# Patient Record
Sex: FEMALE | Race: WHITE | NOT HISPANIC OR LATINO | Employment: FULL TIME | URBAN - NONMETROPOLITAN AREA
[De-identification: names, ages, dates, MRNs, and addresses within clinical notes are randomized per-mention and may not be internally consistent; named-entity substitution may affect disease eponyms.]

---

## 2019-03-12 ENCOUNTER — OFFICE VISIT (OUTPATIENT)
Dept: GASTROENTEROLOGY | Facility: CLINIC | Age: 34
End: 2019-03-12

## 2019-03-12 VITALS
BODY MASS INDEX: 35.5 KG/M2 | WEIGHT: 188 LBS | HEIGHT: 61 IN | DIASTOLIC BLOOD PRESSURE: 83 MMHG | SYSTOLIC BLOOD PRESSURE: 126 MMHG | RESPIRATION RATE: 20 BRPM | HEART RATE: 80 BPM | TEMPERATURE: 97.7 F

## 2019-03-12 DIAGNOSIS — R11.0 NAUSEA: Chronic | ICD-10-CM

## 2019-03-12 DIAGNOSIS — R12 HEARTBURN: Chronic | ICD-10-CM

## 2019-03-12 DIAGNOSIS — R10.32 LEFT LOWER QUADRANT PAIN: Primary | Chronic | ICD-10-CM

## 2019-03-12 DIAGNOSIS — K62.5 BRIGHT RED BLOOD PER RECTUM: Chronic | ICD-10-CM

## 2019-03-12 DIAGNOSIS — R19.7 DIARRHEA, UNSPECIFIED TYPE: Chronic | ICD-10-CM

## 2019-03-12 DIAGNOSIS — Z12.11 COLON CANCER SCREENING: ICD-10-CM

## 2019-03-12 DIAGNOSIS — K59.00 CONSTIPATION, UNSPECIFIED CONSTIPATION TYPE: Chronic | ICD-10-CM

## 2019-03-12 DIAGNOSIS — R13.10 DYSPHAGIA, UNSPECIFIED TYPE: Chronic | ICD-10-CM

## 2019-03-12 PROCEDURE — 99204 OFFICE O/P NEW MOD 45 MIN: CPT | Performed by: NURSE PRACTITIONER

## 2019-03-12 RX ORDER — SODIUM CHLORIDE 9 MG/ML
70 INJECTION, SOLUTION INTRAVENOUS CONTINUOUS PRN
Status: CANCELLED | OUTPATIENT
Start: 2019-04-15

## 2019-03-12 RX ORDER — METRONIDAZOLE 250 MG/1
250 TABLET ORAL 4 TIMES DAILY
Qty: 28 TABLET | Refills: 0 | Status: SHIPPED | OUTPATIENT
Start: 2019-03-12 | End: 2019-03-19

## 2019-03-12 RX ORDER — DICYCLOMINE HYDROCHLORIDE 10 MG/1
10 CAPSULE ORAL DAILY PRN
COMMUNITY
End: 2019-03-21 | Stop reason: HOSPADM

## 2019-03-12 RX ORDER — SODIUM CHLORIDE 9 MG/ML
70 INJECTION, SOLUTION INTRAVENOUS CONTINUOUS PRN
Status: CANCELLED | OUTPATIENT
Start: 2019-03-12

## 2019-03-12 RX ORDER — RANITIDINE 150 MG/1
150 CAPSULE ORAL 2 TIMES DAILY
Qty: 60 CAPSULE | Refills: 5 | Status: SHIPPED | OUTPATIENT
Start: 2019-03-12 | End: 2019-03-21 | Stop reason: HOSPADM

## 2019-03-12 RX ORDER — NAPROXEN 500 MG/1
1000 TABLET ORAL AS NEEDED
COMMUNITY
End: 2022-04-26

## 2019-03-12 RX ORDER — PHENAZOPYRIDINE HYDROCHLORIDE 200 MG/1
200 TABLET, FILM COATED ORAL 3 TIMES DAILY PRN
COMMUNITY
End: 2019-04-11

## 2019-03-12 RX ORDER — CIPROFLOXACIN 500 MG/1
500 TABLET, FILM COATED ORAL 2 TIMES DAILY
Qty: 14 TABLET | Refills: 0 | Status: SHIPPED | OUTPATIENT
Start: 2019-03-12 | End: 2019-03-19

## 2019-03-12 NOTE — PATIENT INSTRUCTIONS
1. Antireflux measures: Avoid fried, fatty foods, alcohol, chocolate, coffee, tea,  soft drinks, peppermint and spearmint, spicy foods, tomatoes and tomato based foods, onion based foods, and smoking. Other antireflux measures include weight reduction if overweight, avoiding tight clothing around the abdomen, elevating the head of the bed 6 inches with blocks under the head board, and don't drink or eat before going to bed and avoid lying down immediately after meals.  2. Ranitidine 150 mg 1 po twice a day.  3. Zofran 4 mg 1 po every 8 hours as needed for nasuea.  4. Treatment for diverticulitis:  A. Low-fat low fiber diet for 5 days thereafter low-fat high-fiber diet.   B. Cipro (ciprofloxacin) tablets 500 mg. Take 1 tablet by mouth twice a day for 7 days. Side effects were discussed.   C. Flagyl (metronidazole) tablets 250 mg. Take 1 tablet one by mouth 4 times a day for 7 days. Side effects were discussed.  D. Avoid laxatives, enemas for next 5 days. However, for constipation the patient may use stool softeners.  E. Patient may take probiotics while taking antibiotics, and continue for an additional 1-2 weeks.  F. Colonoscopy: Description of the procedure, risks, benefits, alternatives and options, including nonoperative options, were discussed with the patient in detail. The patient understands and wishes to proceed, although it may be advisable to wait 3-4 weeks to schedule procedure.  5. The patient is to call in 1 week with update.  6. Upper endoscopy-EGD: Description of the procedure, risks, benefits, alternatives and options, including nonoperative options, were discussed with the patient in detail. The patient understands and wishes to proceed.

## 2019-03-12 NOTE — PROGRESS NOTES
Chief Complaint   Patient presents with   • Abdominal Pain   • Colon Cancer Screening   • Constipation   • Rectal Bleeding     There is a long standing history of left lower quadrant pain. The pain has gradually worsened over the past year. The pain is severe and will occur 1-2 times per month lasting 1-2 days. The pain is so severe, she will often have to call in sick to work. The pain will radiate down into the perineum and left leg at times. The patient has had frequent chills off and on for quite some time, but no fevers.    There is a long standing history of constipation alternating with diarrhea. The patient has been having constipation for the past year and no diarrhea. The patient will have 1 firm bowel movement 2-3 times per week. The patient has been taking Miralax 1-2 times per week with improvement of constipation.     There is a long standing history of bright red blood per rectum. The patient may have bright red blood on the tissue 1-2 times per month and occasionally will see blood in the toilet. It is a small amount.     There is a long standing history of nausea. The patient has nausea 3-4 days per week. Eating a cracker will improve nausea. The patient may have vomiting 1-2 times per month. No history of hematemesis.     There is a history of heartburn for the past year. The patient has heartburn daily. Heartburn occurs daily and will often be worse at night. If the patient is having the pain and constipation, the heartburn is worse. She is not taking anything for heartburn.    There is a long standing history of difficulty swallowing. The patient may feel like breads get stuck 1-2 times per month.    The patient has not had a colonoscopy in the past. There is a family history of colon cancer in the patient's maternal uncle, maternal grandmother, paternal grandmother and paternal grandfather.     Constipation   This is a chronic problem. Episode onset: over 5 years. The problem is unchanged. Her  stool frequency is 2 to 3 times per week. The stool is described as firm. The patient is on a high fiber diet. There has been adequate water intake. Associated symptoms include abdominal pain, diarrhea, hematochezia, nausea and vomiting. Pertinent negatives include no fever. Treatments tried: Miralax. The treatment provided significant relief.   Rectal Bleeding   This is a chronic problem. Episode onset: over 5 years. The problem occurs intermittently. The problem has been unchanged. Associated symptoms include abdominal pain, fatigue, nausea and vomiting. Pertinent negatives include no arthralgias, chest pain, chills, coughing, fever, headaches, joint swelling, myalgias or rash. Exacerbated by: bowel movements. She has tried nothing for the symptoms.   Abdominal Pain   This is a chronic problem. Episode onset: over 5 years. The onset quality is sudden. The problem occurs intermittently. The pain is located in the LLQ. The pain is severe. The quality of the pain is sharp. The abdominal pain radiates to the perineum. Associated symptoms include constipation, diarrhea, hematochezia, hematuria, nausea and vomiting. Pertinent negatives include no arthralgias, dysuria, fever, headaches or myalgias. The pain is relieved by nothing. Treatments tried: Dicyclomine. The treatment provided moderate relief. Her past medical history is significant for GERD.   Nausea   This is a chronic problem. Episode onset: over 3 years. The problem occurs intermittently. The problem has been unchanged. Associated symptoms include abdominal pain, fatigue, nausea and vomiting. Pertinent negatives include no arthralgias, chest pain, chills, coughing, fever, headaches, joint swelling, myalgias or rash. Nothing aggravates the symptoms. She has tried eating for the symptoms. The treatment provided moderate relief.   Heartburn   She complains of abdominal pain, heartburn and nausea. She reports no chest pain or no coughing. This is a chronic  problem. The current episode started more than 1 year ago. The problem occurs frequently. The problem has been unchanged. The heartburn duration is an hour. The heartburn is located in the substernum. The heartburn is of severe intensity. The heartburn wakes her from sleep. Nothing aggravates the symptoms. Associated symptoms include fatigue. Risk factors include caffeine use. She has tried nothing for the symptoms.   Difficulty Swallowing   This is a chronic problem. The current episode started more than 1 year ago. The problem occurs intermittently. The problem has been unchanged. Associated symptoms include abdominal pain, fatigue, nausea and vomiting. Pertinent negatives include no arthralgias, chest pain, chills, coughing, fever, headaches, joint swelling, myalgias or rash. The symptoms are aggravated by eating. She has tried nothing for the symptoms.     Review of Systems   Constitutional: Positive for fatigue. Negative for appetite change, chills, fever and unexpected weight change.   HENT: Positive for dental problem. Negative for mouth sores, nosebleeds and trouble swallowing.    Eyes: Negative for discharge and redness.   Respiratory: Negative for apnea, cough and shortness of breath.    Cardiovascular: Negative for chest pain, palpitations and leg swelling.   Gastrointestinal: Positive for abdominal pain, anal bleeding, blood in stool, constipation, diarrhea, heartburn, hematochezia, nausea and vomiting.   Endocrine: Positive for cold intolerance and polydipsia. Negative for heat intolerance.   Genitourinary: Positive for hematuria. Negative for dysuria and urgency.   Musculoskeletal: Negative for arthralgias, joint swelling and myalgias.   Skin: Negative for rash.   Allergic/Immunologic: Negative for food allergies and immunocompromised state.   Neurological: Negative for dizziness, seizures, syncope and headaches.   Hematological: Negative for adenopathy. Bruises/bleeds easily.   Psychiatric/Behavioral:  Negative for dysphoric mood. The patient is nervous/anxious. The patient is not hyperactive.      Patient Active Problem List   Diagnosis   • Left lower quadrant pain   • Constipation   • Bright red blood per rectum   • Nausea   • Dysphagia   • Heartburn   • Diarrhea     Past Medical History:   Diagnosis Date   • Diabetes mellitus (CMS/HCC)     Type I   • Gallstones    • Hypertension    • Kidney infection    • Kidney stone    • Migraine    • Tattoos      Past Surgical History:   Procedure Laterality Date   •  SECTION      , ,    • D&C HYSTEROSCOPY ENDOMETRIAL ABLATION     • TEETH EXTRACTION      dentures on upper plate   • TUBAL ABDOMINAL LIGATION       Family History   Problem Relation Age of Onset   • Diabetes Other    • Colon cancer Paternal Uncle    • Colon cancer Maternal Grandmother    • Crohn's disease Maternal Grandmother    • Colon cancer Paternal Grandmother    • Other Paternal Grandmother         Colitis   • Colon cancer Paternal Grandfather    • Cirrhosis Neg Hx    • Liver cancer Neg Hx    • Liver disease Neg Hx    • Ulcerative colitis Neg Hx    • Esophageal cancer Neg Hx    • Stomach cancer Neg Hx      Social History     Tobacco Use   • Smoking status: Former Smoker     Packs/day: 0.25     Types: Cigarettes     Start date:      Last attempt to quit:      Years since quittin.1   • Smokeless tobacco: Never Used   Substance Use Topics   • Alcohol use: Yes     Comment: Social       Current Outpatient Medications:   •  dicyclomine (BENTYL) 10 MG capsule, Take 10 mg by mouth Daily., Disp: , Rfl:   •  Ertugliflozin L-PyroglutamicAc (STEGLATRO) 15 MG tablet, Take 15 mg by mouth Every Morning., Disp: , Rfl:   •  Insulin Glargine (BASAGLAR KWIKPEN SC), Inject 60 Units under the skin into the appropriate area as directed Daily., Disp: , Rfl:   •  naproxen (NAPROSYN) 500 MG tablet, Take 1,000 mg by mouth As Needed for Mild Pain ., Disp: , Rfl:   •  ondansetron (ZOFRAN) 4 MG  "tablet, Take 4 mg by mouth Every 8 (Eight) Hours As Needed., Disp: , Rfl:   •  phenazopyridine (PYRIDIUM) 200 MG tablet, Take 200 mg by mouth 3 (Three) Times a Day As Needed for bladder spasms., Disp: , Rfl:   •  ciprofloxacin (CIPRO) 500 MG tablet, Take 1 tablet by mouth 2 (Two) Times a Day for 7 days. Take 1 tablet twice a day x 7 days, Disp: 14 tablet, Rfl: 0  •  metroNIDAZOLE (FLAGYL) 250 MG tablet, Take 1 tablet by mouth 4 (Four) Times a Day for 7 days. Take 1 tablet four times daily x 7 days, Disp: 28 tablet, Rfl: 0  •  Probiotic capsule, Take 1 capsule by mouth Daily., Disp: 30 capsule, Rfl: 1  •  ranitidine (ZANTAC) 150 MG capsule, Take 1 capsule by mouth 2 (Two) Times a Day., Disp: 60 capsule, Rfl: 5    Allergies   Allergen Reactions   • Hydrocodone Itching and Rash   • Jardiance [Empagliflozin] Itching and Rash     Blood pressure 126/83, pulse 80, temperature 97.7 °F (36.5 °C), resp. rate 20, height 154.9 cm (61\"), weight 85.3 kg (188 lb), not currently breastfeeding.    Physical Exam   Constitutional: She is oriented to person, place, and time. She appears well-developed and well-nourished. No distress.   HENT:   Head: Normocephalic and atraumatic.   Right Ear: Hearing and external ear normal.   Left Ear: Hearing and external ear normal.   Nose: Nose normal.   Mouth/Throat: Oropharynx is clear and moist and mucous membranes are normal. Mucous membranes are not pale, not dry and not cyanotic. No oral lesions. No oropharyngeal exudate.   Eyes: Conjunctivae and EOM are normal. Right eye exhibits no discharge. Left eye exhibits no discharge.   Neck: Trachea normal. Neck supple. No JVD present. No edema present. No thyroid mass and no thyromegaly present.   Cardiovascular: Normal rate, regular rhythm, S2 normal and normal heart sounds. Exam reveals no gallop, no S3 and no friction rub.   No murmur heard.  Pulmonary/Chest: Effort normal and breath sounds normal. No respiratory distress. She exhibits no " tenderness.   Abdominal: Normal appearance and bowel sounds are normal. She exhibits no distension, no ascites and no mass. There is no splenomegaly or hepatomegaly. There is tenderness (moderate to severe in left lower quadrant, mild to moderate in epigastric area) in the epigastric area and left lower quadrant. There is no rigidity, no rebound and no guarding. No hernia.     Vascular Status -  Her right foot exhibits no edema. Her left foot exhibits no edema.  Lymphadenopathy:     She has no cervical adenopathy.        Left: No supraclavicular adenopathy present.   Neurological: She is alert and oriented to person, place, and time. She has normal strength. No cranial nerve deficit or sensory deficit.   Skin: No rash noted. She is not diaphoretic. No cyanosis. No pallor. Nails show no clubbing.   Psychiatric: She has a normal mood and affect.   Nursing note and vitals reviewed.  Stigmata of chronic liver disease:  None.  Asterixis:  None.    Assessment:      ICD-10-CM ICD-9-CM   1. Left lower quadrant pain R10.32 789.04   2. Constipation, unspecified constipation type K59.00 564.00   3. Bright red blood per rectum K62.5 569.3   4. Nausea R11.0 787.02   5. Dysphagia, unspecified type R13.10 787.20   6. Heartburn R12 787.1   7. Diarrhea, unspecified type R19.7 787.91   8. Colon cancer screening Z12.11 V76.51       Discussion:  1. Left lower quadrant pain consistent with diverticulitis.  2. History of recurrent constipation.  This is described as a change in bowel habits.  Differentials include diverticulitis.  3. History of bright red blood per rectum.  Differentials include diverticulitis, hemorrhoids, fissure, vascular ectasia.  4. Long-standing history of nausea.  Differentials include diverticulitis, peptic ulcer disease, pancreatobiliary disease.  5. Long-standing history of difficulty swallowing.  Differentials include Schatzki's ring, esophagitis, esophageal dysmotility.  6. Long-standing history of heartburn.   Not on medications at this time.  Concerns for underlying Batres's.  7. Long-standing history of diarrhea with significant improvement over the past year.  8. The patient has not had a colonoscopy in the past. There is a family history of colon cancer in the patient's maternal uncle, maternal grandmother, paternal grandmother and paternal grandfather.     Plan/  Patient Instructions   1. Antireflux measures: Avoid fried, fatty foods, alcohol, chocolate, coffee, tea,  soft drinks, peppermint and spearmint, spicy foods, tomatoes and tomato based foods, onion based foods, and smoking. Other antireflux measures include weight reduction if overweight, avoiding tight clothing around the abdomen, elevating the head of the bed 6 inches with blocks under the head board, and don't drink or eat before going to bed and avoid lying down immediately after meals.  2. Ranitidine 150 mg 1 po twice a day.  3. Zofran 4 mg 1 po every 8 hours as needed for nasuea.  4. Treatment for diverticulitis:  A. Low-fat low fiber diet for 5 days thereafter low-fat high-fiber diet.   B. Cipro (ciprofloxacin) tablets 500 mg. Take 1 tablet by mouth twice a day for 7 days. Side effects were discussed.   C. Flagyl (metronidazole) tablets 250 mg. Take 1 tablet one by mouth 4 times a day for 7 days. Side effects were discussed.  D. Avoid laxatives, enemas for next 5 days. However, for constipation the patient may use stool softeners.  E. Patient may take probiotics while taking antibiotics, and continue for an additional 1-2 weeks.  F. Colonoscopy: Description of the procedure, risks, benefits, alternatives and options, including nonoperative options, were discussed with the patient in detail. The patient understands and wishes to proceed, although it may be advisable to wait 3-4 weeks to schedule procedure.  5. The patient is to call in 1 week with update.  6. Upper endoscopy-EGD: Description of the procedure, risks, benefits, alternatives and  options, including nonoperative options, were discussed with the patient in detail. The patient understands and wishes to proceed.     Agustin Arreguin, APRN

## 2019-03-21 ENCOUNTER — ANESTHESIA (OUTPATIENT)
Dept: GASTROENTEROLOGY | Facility: HOSPITAL | Age: 34
End: 2019-03-21

## 2019-03-21 ENCOUNTER — ANESTHESIA EVENT (OUTPATIENT)
Dept: GASTROENTEROLOGY | Facility: HOSPITAL | Age: 34
End: 2019-03-21

## 2019-03-21 ENCOUNTER — HOSPITAL ENCOUNTER (OUTPATIENT)
Facility: HOSPITAL | Age: 34
Setting detail: HOSPITAL OUTPATIENT SURGERY
Discharge: HOME | End: 2019-03-21
Attending: INTERNAL MEDICINE | Admitting: INTERNAL MEDICINE

## 2019-03-21 VITALS
HEART RATE: 77 BPM | WEIGHT: 188 LBS | TEMPERATURE: 97.9 F | HEIGHT: 61 IN | DIASTOLIC BLOOD PRESSURE: 67 MMHG | BODY MASS INDEX: 35.5 KG/M2 | SYSTOLIC BLOOD PRESSURE: 104 MMHG | OXYGEN SATURATION: 98 % | RESPIRATION RATE: 18 BRPM

## 2019-03-21 DIAGNOSIS — R12 HEARTBURN: ICD-10-CM

## 2019-03-21 DIAGNOSIS — R11.0 NAUSEA: ICD-10-CM

## 2019-03-21 DIAGNOSIS — R13.10 DYSPHAGIA, UNSPECIFIED TYPE: ICD-10-CM

## 2019-03-21 LAB
B-HCG UR QL: NEGATIVE
GLUCOSE BLDC GLUCOMTR-MCNC: 129 MG/DL (ref 70–130)
INTERNAL NEGATIVE CONTROL: NEGATIVE
INTERNAL POSITIVE CONTROL: POSITIVE
Lab: NORMAL

## 2019-03-21 PROCEDURE — 81025 URINE PREGNANCY TEST: CPT | Performed by: INTERNAL MEDICINE

## 2019-03-21 PROCEDURE — 82962 GLUCOSE BLOOD TEST: CPT

## 2019-03-21 PROCEDURE — 25010000002 ONDANSETRON PER 1 MG: Performed by: NURSE ANESTHETIST, CERTIFIED REGISTERED

## 2019-03-21 PROCEDURE — 25010000002 PROPOFOL 200 MG/20ML EMULSION: Performed by: NURSE ANESTHETIST, CERTIFIED REGISTERED

## 2019-03-21 PROCEDURE — 43239 EGD BIOPSY SINGLE/MULTIPLE: CPT | Performed by: INTERNAL MEDICINE

## 2019-03-21 PROCEDURE — C1726 CATH, BAL DIL, NON-VASCULAR: HCPCS

## 2019-03-21 PROCEDURE — 43249 ESOPH EGD DILATION <30 MM: CPT | Performed by: INTERNAL MEDICINE

## 2019-03-21 RX ORDER — PROPOFOL 10 MG/ML
INJECTION, EMULSION INTRAVENOUS AS NEEDED
Status: DISCONTINUED | OUTPATIENT
Start: 2019-03-21 | End: 2019-03-21 | Stop reason: SURG

## 2019-03-21 RX ORDER — ONDANSETRON 2 MG/ML
INJECTION INTRAMUSCULAR; INTRAVENOUS AS NEEDED
Status: DISCONTINUED | OUTPATIENT
Start: 2019-03-21 | End: 2019-03-21 | Stop reason: SURG

## 2019-03-21 RX ORDER — SODIUM CHLORIDE 9 MG/ML
70 INJECTION, SOLUTION INTRAVENOUS CONTINUOUS PRN
Status: DISCONTINUED | OUTPATIENT
Start: 2019-03-21 | End: 2019-03-21 | Stop reason: HOSPADM

## 2019-03-21 RX ORDER — METOCLOPRAMIDE 5 MG/1
2.5 TABLET ORAL
Qty: 30 TABLET | Refills: 0 | Status: SHIPPED | OUTPATIENT
Start: 2019-03-21 | End: 2022-04-25

## 2019-03-21 RX ORDER — PANTOPRAZOLE SODIUM 40 MG/1
TABLET, DELAYED RELEASE ORAL
Qty: 30 TABLET | Refills: 3 | Status: SHIPPED | OUTPATIENT
Start: 2019-03-21 | End: 2021-08-26

## 2019-03-21 RX ADMIN — LIDOCAINE HYDROCHLORIDE 40 MG: 20 INJECTION, SOLUTION INTRAVENOUS at 08:03

## 2019-03-21 RX ADMIN — PROPOFOL 50 MG: 10 INJECTION, EMULSION INTRAVENOUS at 08:18

## 2019-03-21 RX ADMIN — PROPOFOL 100 MG: 10 INJECTION, EMULSION INTRAVENOUS at 08:12

## 2019-03-21 RX ADMIN — PROPOFOL 50 MG: 10 INJECTION, EMULSION INTRAVENOUS at 08:06

## 2019-03-21 RX ADMIN — ONDANSETRON 4 MG: 2 INJECTION INTRAMUSCULAR; INTRAVENOUS at 08:03

## 2019-03-21 RX ADMIN — PROPOFOL 50 MG: 10 INJECTION, EMULSION INTRAVENOUS at 08:15

## 2019-03-21 RX ADMIN — SODIUM CHLORIDE: 9 INJECTION, SOLUTION INTRAVENOUS at 07:57

## 2019-03-21 RX ADMIN — LIDOCAINE HYDROCHLORIDE 60 MG: 20 INJECTION, SOLUTION INTRAVENOUS at 08:14

## 2019-03-21 RX ADMIN — PROPOFOL 50 MG: 10 INJECTION, EMULSION INTRAVENOUS at 08:03

## 2019-03-21 RX ADMIN — SODIUM CHLORIDE 70 ML/HR: 9 INJECTION, SOLUTION INTRAVENOUS at 07:00

## 2019-03-21 NOTE — DISCHARGE INSTRUCTIONS
No pushing, pulling, tugging,  heavy lifting, or strenuous activity.  No major decision making, driving, or drinking alcoholic beverages for 24 hours. ( due to the medications you have  received)  Always use good hand hygiene/washing techniques.  NO driving while taking pain medications.    To assist you in voiding:  Drink plenty of fluids  Listen to running water while attempting to void.    If you are unable to urinate and you have an uncomfortable urge to void or it has been   6 hours since you were discharged, return to the Emergency Room    ************************************************************************************    Postprocedure instructions:  1. Nothing by mouth until fully alert and as specified below .  2. Bedrest until fully alert.  3. Vital signs as routine.    Diet:   1. Nothing by mouth for 60 minutes, then if no chest pains the patient may have Clear liquids diet (No Sodas) for 4 hours.  2. May advance to soft diet in 4 hours if no chest pains, Fever or chills, nausea vomiting or bleeding.    Other instructions:  1. The patient may eat in upright position, chew well, take small bites and take medications in upright position.   2. The patient should drink water after 3-4 bites, and liberally with medications.   3. The patient should remain upright for about 10 minutes after eating and taking oral medications.      Blood Thinner and other medications Directions:  Avoid Aspirin & other NSAIDS for 7 days.  Tylenol is okay.    Other instructions:  The patient should avoid medications that have a potential to cause pill esophagitis including potassium pills, tetracycline capsules, iron pills, NSAIDs and bisphosphonates.      Follow-up:    DR. WILL HELTON in 4 weeks.Office phone # [phone].      ********************************************************************************************************************************************************    Notes to the patient and the family from   Becky.     Dear patient/family member,    Findings on today's procedure are as follows:    1. Esophagitis. Inflammation of the esophagus.  2. Esophageal rings. These areas were stretched.   3. Inflammation of the stomach.  Erosive gastritis.  4. Small sliding hiatal hernia.  5. No cancer. No active ulcers.     Recommendations:    1. Protonix (Pantoprazole) tablet 40 mg tablet. Take 1 tablet orally in the morning half an hour before eating every day.  2. Reglan (metoclopramide) 5 mg tablets.  Take one half tablet (2.5 mg) by mouth in the morning and in the evening (2 times daily preferably half an hour before food).    3. Other instructions as above.      Should you have more questions please do not hesitate to talk to the nurse who can call me and let me talk to you.     I hope you feel better.    Adán Pena M.D., FACP, FACG.

## 2019-03-21 NOTE — ANESTHESIA PREPROCEDURE EVALUATION
Anesthesia Evaluation     Patient summary reviewed and Nursing notes reviewed   history of anesthetic complications: PONV  NPO Solid Status: > 8 hours  NPO Liquid Status: > 8 hours           Airway   Mallampati: I  TM distance: >3 FB  Neck ROM: full  No difficulty expected  Dental - normal exam   (+) upper dentures    Pulmonary - negative pulmonary ROS and normal exam   Cardiovascular - normal exam    (+) hypertension,       Neuro/Psych  (+) headaches, psychiatric history Anxiety,     GI/Hepatic/Renal/Endo    (+) obesity,   diabetes mellitus,     Musculoskeletal (-) negative ROS    Abdominal  - normal exam    Bowel sounds: normal.   Substance History - negative use     OB/GYN negative ob/gyn ROS         Other                        Anesthesia Plan    ASA 2     MAC     intravenous induction   Anesthetic plan, all risks, benefits, and alternatives have been provided, discussed and informed consent has been obtained with: patient.    Plan discussed with attending.

## 2019-03-25 ENCOUNTER — TELEPHONE (OUTPATIENT)
Dept: GASTROENTEROLOGY | Facility: CLINIC | Age: 34
End: 2019-03-25

## 2019-03-25 DIAGNOSIS — K59.00 CONSTIPATION, UNSPECIFIED CONSTIPATION TYPE: ICD-10-CM

## 2019-03-25 DIAGNOSIS — K62.5 BRIGHT RED BLOOD PER RECTUM: ICD-10-CM

## 2019-03-25 DIAGNOSIS — R10.32 LEFT LOWER QUADRANT PAIN: Primary | ICD-10-CM

## 2019-03-25 DIAGNOSIS — R19.7 DIARRHEA, UNSPECIFIED TYPE: ICD-10-CM

## 2019-03-25 LAB
LAB AP CASE REPORT: NORMAL
PATH REPORT.FINAL DX SPEC: NORMAL

## 2019-03-25 RX ORDER — CIPROFLOXACIN 500 MG/1
500 TABLET, FILM COATED ORAL 2 TIMES DAILY
Qty: 14 TABLET | Refills: 0 | Status: SHIPPED | OUTPATIENT
Start: 2019-03-25 | End: 2019-04-01

## 2019-03-25 RX ORDER — METRONIDAZOLE 250 MG/1
250 TABLET ORAL 4 TIMES DAILY
Qty: 28 TABLET | Refills: 0 | Status: SHIPPED | OUTPATIENT
Start: 2019-03-25 | End: 2019-04-01

## 2019-03-25 NOTE — TELEPHONE ENCOUNTER
Pt is still having abdominal problems with diverticulitis, , is getting another round of antibiotics and to call us when finished

## 2019-03-25 NOTE — TELEPHONE ENCOUNTER
Patient had gotten better after taking Cipro and Flagyl, but after she finished, the symptoms returned. Will call in Cipro and Flagyl for 7 days as prior, she will call us back with update.

## 2019-04-04 PROBLEM — Z12.11 COLON CANCER SCREENING: Status: ACTIVE | Noted: 2019-04-04

## 2019-04-15 ENCOUNTER — ANESTHESIA EVENT (OUTPATIENT)
Dept: GASTROENTEROLOGY | Facility: HOSPITAL | Age: 34
End: 2019-04-15

## 2019-04-15 ENCOUNTER — ANESTHESIA (OUTPATIENT)
Dept: GASTROENTEROLOGY | Facility: HOSPITAL | Age: 34
End: 2019-04-15

## 2019-04-15 ENCOUNTER — HOSPITAL ENCOUNTER (OUTPATIENT)
Facility: HOSPITAL | Age: 34
Setting detail: HOSPITAL OUTPATIENT SURGERY
Discharge: HOME | End: 2019-04-15
Attending: INTERNAL MEDICINE | Admitting: INTERNAL MEDICINE

## 2019-04-15 VITALS
SYSTOLIC BLOOD PRESSURE: 127 MMHG | HEIGHT: 61 IN | RESPIRATION RATE: 18 BRPM | BODY MASS INDEX: 35.5 KG/M2 | DIASTOLIC BLOOD PRESSURE: 66 MMHG | OXYGEN SATURATION: 98 % | WEIGHT: 188 LBS | TEMPERATURE: 97.4 F | HEART RATE: 77 BPM

## 2019-04-15 DIAGNOSIS — Z12.11 COLON CANCER SCREENING: ICD-10-CM

## 2019-04-15 DIAGNOSIS — R19.7 DIARRHEA, UNSPECIFIED TYPE: ICD-10-CM

## 2019-04-15 DIAGNOSIS — K62.5 BRIGHT RED BLOOD PER RECTUM: ICD-10-CM

## 2019-04-15 DIAGNOSIS — R10.32 LEFT LOWER QUADRANT PAIN: ICD-10-CM

## 2019-04-15 DIAGNOSIS — K59.00 CONSTIPATION, UNSPECIFIED CONSTIPATION TYPE: ICD-10-CM

## 2019-04-15 LAB
B-HCG UR QL: NEGATIVE
GLUCOSE BLDC GLUCOMTR-MCNC: 94 MG/DL (ref 70–130)
INTERNAL NEGATIVE CONTROL: NEGATIVE
INTERNAL POSITIVE CONTROL: POSITIVE
Lab: NORMAL

## 2019-04-15 PROCEDURE — 45385 COLONOSCOPY W/LESION REMOVAL: CPT | Performed by: INTERNAL MEDICINE

## 2019-04-15 PROCEDURE — 81025 URINE PREGNANCY TEST: CPT | Performed by: INTERNAL MEDICINE

## 2019-04-15 PROCEDURE — 25010000002 PROPOFOL 10 MG/ML EMULSION: Performed by: NURSE ANESTHETIST, CERTIFIED REGISTERED

## 2019-04-15 PROCEDURE — 82962 GLUCOSE BLOOD TEST: CPT

## 2019-04-15 PROCEDURE — S0260 H&P FOR SURGERY: HCPCS | Performed by: INTERNAL MEDICINE

## 2019-04-15 PROCEDURE — 45380 COLONOSCOPY AND BIOPSY: CPT | Performed by: INTERNAL MEDICINE

## 2019-04-15 PROCEDURE — 25010000002 ONDANSETRON PER 1 MG: Performed by: NURSE ANESTHETIST, CERTIFIED REGISTERED

## 2019-04-15 DEVICE — CLIPPING DEVICE
Type: IMPLANTABLE DEVICE | Status: FUNCTIONAL
Brand: RESOLUTION CLIP

## 2019-04-15 RX ORDER — ONDANSETRON 2 MG/ML
4 INJECTION INTRAMUSCULAR; INTRAVENOUS ONCE AS NEEDED
Status: CANCELLED | OUTPATIENT
Start: 2019-04-15 | End: 2019-04-15

## 2019-04-15 RX ORDER — LIDOCAINE HYDROCHLORIDE 20 MG/ML
INJECTION, SOLUTION INFILTRATION; PERINEURAL AS NEEDED
Status: DISCONTINUED | OUTPATIENT
Start: 2019-04-15 | End: 2019-04-15 | Stop reason: SURG

## 2019-04-15 RX ORDER — ONDANSETRON 2 MG/ML
4 INJECTION INTRAMUSCULAR; INTRAVENOUS ONCE
Status: COMPLETED | OUTPATIENT
Start: 2019-04-15 | End: 2019-04-15

## 2019-04-15 RX ORDER — SODIUM CHLORIDE 0.9 % (FLUSH) 0.9 %
3 SYRINGE (ML) INJECTION AS NEEDED
Status: DISCONTINUED | OUTPATIENT
Start: 2019-04-15 | End: 2019-04-15 | Stop reason: HOSPADM

## 2019-04-15 RX ORDER — PROPOFOL 10 MG/ML
VIAL (ML) INTRAVENOUS AS NEEDED
Status: DISCONTINUED | OUTPATIENT
Start: 2019-04-15 | End: 2019-04-15 | Stop reason: SURG

## 2019-04-15 RX ORDER — LIDOCAINE 50 MG/G
OINTMENT TOPICAL AS NEEDED
Status: DISCONTINUED | OUTPATIENT
Start: 2019-04-15 | End: 2019-04-15 | Stop reason: HOSPADM

## 2019-04-15 RX ORDER — SODIUM CHLORIDE 9 MG/ML
70 INJECTION, SOLUTION INTRAVENOUS CONTINUOUS PRN
Status: DISCONTINUED | OUTPATIENT
Start: 2019-04-15 | End: 2019-04-15 | Stop reason: HOSPADM

## 2019-04-15 RX ORDER — SIMETHICONE 20 MG/.3ML
EMULSION ORAL AS NEEDED
Status: DISCONTINUED | OUTPATIENT
Start: 2019-04-15 | End: 2019-04-15 | Stop reason: HOSPADM

## 2019-04-15 RX ADMIN — ONDANSETRON 4 MG: 2 INJECTION INTRAMUSCULAR; INTRAVENOUS at 08:43

## 2019-04-15 RX ADMIN — Medication 3 ML: at 08:45

## 2019-04-15 RX ADMIN — SODIUM CHLORIDE: 9 INJECTION, SOLUTION INTRAVENOUS at 09:45

## 2019-04-15 RX ADMIN — LIDOCAINE HYDROCHLORIDE 100 MG: 20 INJECTION, SOLUTION INFILTRATION; PERINEURAL at 09:51

## 2019-04-15 RX ADMIN — PROPOFOL 700 MG: 10 INJECTION, EMULSION INTRAVENOUS at 10:31

## 2019-04-15 NOTE — DISCHARGE INSTRUCTIONS
"CLIP TEACHING SHEET INCLUDED IN PACKET.      Please follow all post op instructions and follow up appointment time from your physician's office included in your discharge packet.  .   No pushing, pulling, tugging,  heavy lifting, or strenuous activity.  No major decision making, driving, or drinking alcoholic beverages for 24 hours. ( due to the medications you have  received)  Always use good hand hygiene/washing techniques.  NO driving while taking pain medications.    To assist you in voiding:  Drink plenty of fluids  Listen to running water while attempting to void.    If you are unable to urinate and you have an uncomfortable urge to void or it has been   6 hours since you were discharged, return to the Emergency Room*******************************************    Postprocedure instructions:    Nothing by mouth to fully alert.  Once fully alert may have clear liquid diet.  Advance diet as tolerated.  Vital signs as routine.    Diet:     1. Low-fat diet.  2. Klaudia's All Bran-Bran Buds 1/3 cup daily.  3. May add Honey Bunches of Oats with Almonds 1/4 cup for taste.  4. Use almond milk, or \"fair life\"skim or 1% milk.  5. Drink liberal amounts of water.    Blood Thinner Directions:    Avoid Aspirin & other NSAIDS for _7__ days. Tylenol is okay.    Other Instructions:    Call  at [phone] or come to the Emergency Department if you experience the following: Chest pain, abdominal pain, bleeding (vomiting of blood or coffee colored material, black stools or ramses blood in stools), fever/chills, nausea and vomiting or dizziness.      Follow-up:  DR. WILL PENA in 4 weeks.Office phone # [phone].    Follow-up colonoscopy: in 5 years.    ************************************************************************************    Notes to the patient and the family from Dr. Pena.    Dear patient/family member,    Today your colon was examined extensively from rectum to cecum and beyond into " the small intestine twice.     Findings on today's procedure are as follows:    1. Diverticulosis. These are benign outpouchings in the colon.   2. Colon polyps. 2 were found and removed.   3. No cancer. No inflammation or colitis. No suggestion of Crohn's disease.  4. Internal hemorrhoids.    Recommendations:    As above.    Use CORTIZONE 10 OINTMENT (hydrocortisone 1% ointment) over the counter. AVOID CREAM OR GEL.  Apply anorectally  2-3 times a day for 1 week.  May need to use a liner to protect the garments.    Should you have more questions please do not hesitate to talk to the nurse who can call me and let me talk to you.      I hope you feel better.    Adán Pena M.D., FACP, FACG.

## 2019-04-15 NOTE — ANESTHESIA POSTPROCEDURE EVALUATION
Patient: Rain Barreto    Procedure Summary     Date:  04/15/19 Room / Location:  [facility] ENDOSCOPY 2 [facility]    Anesthesia Start:  0945 Anesthesia Stop:  1038    Procedure:  COLONOSCOPY W/ COLD FORCEP BIOPSIES; COLD SNARE POLYPECTOMY; COLD FORCEP POLYPECTOMY; RESOLUTION CLIP X1 (N/A Anus) Diagnosis:       Left lower quadrant pain      Constipation, unspecified constipation type      Bright red blood per rectum      Diarrhea, unspecified type      Colon cancer screening      (Left lower quadrant pain [R10.32])      (Constipation, unspecified constipation type [K59.00])      (Bright red blood per rectum [K62.5])      (Diarrhea, unspecified type [R19.7])      (Colon cancer screening [Z12.11])    Surgeon:  Adán Pena MD Provider:  Sai Lewis CRNA    Anesthesia Type:  MAC ASA Status:  2          Anesthesia Type: MAC  Last vitals  BP   127/66 (04/15/19 1120)   Temp   97.4 °F (36.3 °C) (04/15/19 1040)   Pulse   77 (04/15/19 1120)   Resp   18 (04/15/19 1120)     SpO2   98 % (04/15/19 1120)     Post Anesthesia Care and Evaluation    Patient location during evaluation: PHASE II  Patient participation: complete - patient participated  Level of consciousness: awake  Pain score: 1  Pain management: adequate  Airway patency: patent  Anesthetic complications: No anesthetic complications  PONV Status: controlled  Cardiovascular status: acceptable and stable  Respiratory status: acceptable  Hydration status: acceptable

## 2019-04-15 NOTE — H&P
Chief complaint:  Constipation/Diarrhea/BRBPR    History of present illness: No prior colonoscopy, Family History of Colon Cancer(Mat Uncle, GM x 2, GF), Constipation, Diarrhea, BRBPR, LLQ Abdominal pain, Treated for Diverticulitis, Nausea/Vomiting, Heartburn, Dysphagia      Past medical history:   Past Medical History:   Diagnosis Date   • Anxiety    • Diabetes mellitus (CMS/HCC)     Type I   • Dysphagia    • Gallstones    • Hypertension    • Kidney infection    • Kidney stone    • Migraine    • PONV (postoperative nausea and vomiting)    • Tattoos    • Wears dentures     upper       Surgical history:    Past Surgical History:   Procedure Laterality Date   •  SECTION      , ,    • D&C HYSTEROSCOPY ENDOMETRIAL ABLATION     • ENDOSCOPY N/A 3/21/2019    Procedure: ESOPHAGOGASTRODUODENOSCOPY with cold biopsies and dilation;  Surgeon: Adán Pena MD;  Location: [facility] ENDOSCOPY;  Service: Gastroenterology   • TEETH EXTRACTION      dentures on upper plate   • TUBAL ABDOMINAL LIGATION         Social history:  Social History     Socioeconomic History   • Marital status:      Spouse name: Not on file   • Number of children: Not on file   • Years of education: Not on file   • Highest education level: Not on file   Tobacco Use   • Smoking status: Former Smoker     Packs/day: 0.25     Types: Cigarettes     Start date:      Last attempt to quit: 2012     Years since quittin.2   • Smokeless tobacco: Never Used   Substance and Sexual Activity   • Alcohol use: Yes     Comment: Social   • Drug use: No   • Sexual activity: Defer       Allergies:  Hydrocodone and Jardiance [empagliflozin]  Latex allergy: None  Contrast allergy: None    Medications:  Medications Prior to Admission   Medication Sig Dispense Refill Last Dose   • Ertugliflozin L-PyroglutamicAc (STEGLATRO) 15 MG tablet Take 10 mg by mouth Every Morning.   2019 at 0900   • Insulin Glargine (BASAGLAR KWIKPEN SC) Inject 60  "Units under the skin into the appropriate area as directed Daily.   4/14/2019 at 0900   • metoclopramide (REGLAN) 5 MG tablet Take 1/2 tablets by mouth 2 (Two) Times a Day Before Meals. (Patient taking differently: Take 2.5 mg by mouth Daily As Needed.) 30 tablet 0 Past Week at Unknown time   • naproxen (NAPROSYN) 500 MG tablet Take 1,000 mg by mouth As Needed for Mild Pain .   Past Month at Unknown time   • ondansetron (ZOFRAN) 4 MG tablet Take 4 mg by mouth Every 8 (Eight) Hours As Needed for Nausea.   Past Month at Unknown time   • pantoprazole (PROTONIX) 40 MG EC tablet Take 1 tablet by mouth 30 minutes before breakfast daily. (Patient taking differently: Take 40 mg by mouth Daily. Take 1 tablet by mouth 30 minutes before breakfast daily.) 30 tablet 3 4/14/2019 at 0900   • Probiotic capsule Take 1 capsule by mouth Daily. 30 capsule 1 4/14/2019 at 0900       Review of systems:   Constitutional: No recent: Fever, Weight loss,   Respiratory: No recent: SOB, Cough,   Cardiovascular: No recent: Chest Pains, congestive heart failure or arrhythmias.   Neurological: No recent: Seizures, CVA, TIA.   Genitourinary: No recent: Renal Failure, UTI.  Endocrine: No recent: Worsening of diabetes or thyroid disease.  Musculoskeletal: No recent: Joint swelling.  Hem. Oncology: No recent: Anemia or bleeding.  Psychiatric: No recent: Worsening of depression or anxiety.     VITAL SIGNS:    Blood pressure 120/81, pulse 82, temperature 97.7 °F (36.5 °C), temperature source Temporal, resp. rate 18, height 154.9 cm (61\"), weight 85.3 kg (188 lb), SpO2 99 %, not currently breastfeeding.    PHYSICAL EXAMINATION:   HEENT: Normal.   Lungs: Clear to auscultation.  Heart: No S3, no murmur.    Abdomen: Soft.  BS+ ND, NT  Extremities: No edema.  No cyanosis.  Neuro: Alert X 3. No focal deficit.    Assessment: No prior colonoscopy, Family History of Colon Cancer(Mat Uncle, GM x 2, GF), Constipation, Diarrhea, BRBPR, LLQ Abdominal pain, Treated " for Diverticulitis,     Plan:   Colonoscopy    Risks/Benefits:  The potential benefits, risk and/or side effects of the procedure and alternatives have been discussed with the patient/authorized representative and questions were answered.

## 2019-04-15 NOTE — ANESTHESIA PREPROCEDURE EVALUATION
Anesthesia Evaluation     Patient summary reviewed and Nursing notes reviewed   history of anesthetic complications: PONV  NPO Solid Status: > 8 hours  NPO Liquid Status: > 8 hours           Airway   Mallampati: I  TM distance: >3 FB  Neck ROM: full  no difficulty expected  Dental - normal exam     Pulmonary - normal exam   (+) a smoker Former,   Cardiovascular - negative cardio ROS and normal exam    Rhythm: regular  Rate: normal    (-) hypertension      Neuro/Psych  (+) psychiatric history Anxiety,     GI/Hepatic/Renal/Endo    (+) obesity,  GERD well controlled,  renal disease stones, diabetes mellitus (FSBS 94) type 2 using insulin,     Musculoskeletal (-) negative ROS    Abdominal  - normal exam    Abdomen: soft.  Bowel sounds: normal.   Substance History   (+) alcohol use,      OB/GYN negative ob/gyn ROS   (-)  Pregnant        Other - negative ROS       ROS/Med Hx Other: Preop Zofran 4mg ordered and given                Anesthesia Plan    ASA 2     MAC   (Risks and benefits discussed including risk of aspiration, recall and dental damage. All patient questions answered. Will continue with POC.)  intravenous induction   Anesthetic plan, all risks, benefits, and alternatives have been provided, discussed and informed consent has been obtained with: patient.

## 2019-04-26 NOTE — OP NOTE
PROCEDURE:  Colonoscopy to the terminal ileum with one cold snare and one cold biopsy polypectomies as well as placement of a resolution clip to coapt the margins and biopsies.    DATE OF PROCEDURE:  April 26, 2019.    REFERRING PROVIDER:  Jesus Wheeler MD     INSTRUMENT USED:  Olympus PCF H 190 videocolonoscope.      INDICATIONS OF THE PROCEDURE: This is a 33-year-old female with history of recurrent diarrhea, at times constipation, bright red blood per rectum, history of left lower quadrant abdominal pain, and family history of colon cancer (both maternal and paternal grandmothers, grandfather and maternal uncle).    PREVIOUS COLONOSCOPY: None.    BIOPSIES: Biopsies were obtained from the terminal ileum.  Random biopsies were obtained from the colon including rectum.  Biopsies were obtained from the rectosigmoid erosions.  Descending colon: 1 cold snare polypectomy.  Rectum: 1 cold biopsy polypectomy.      PHOTOGRAPHS:  Photographs were included in the medical records.     MEDICATIONS:  MAC.       CONSENT/PREPROCEDURE EVALUATION:  Risks, benefits, alternatives and options of the procedure including risks of sedation/anesthesia were discussed with the patient and informed consent was obtained prior to the procedure.  History and physical examination were performed and nothing precluded the test.      REPORT:  The patient was placed in left lateral decubitus position and a digital examination was performed.  Once under the influence of IV sedation, the instrument was inserted into the rectum and advanced under direct vision to cecum which was identified by the ileocecal valve, triradiate folds and appendiceal orifice. The scope was then maneuvered into the terminal ileum.        FINDINGS:      Digital rectal examination:  Good anal tone.  No perianal pathology.  No mass.        Terminal ileum:  7-8 cm.  Normal.  Biopsies were obtained from the terminal ileum.     Cecum and ascending colon: Normal.       Hepatic  flexure, transverse colon, splenic flexure:  Normal.         Descending colon, sigmoid colon and rectum: Scant left-sided diverticulosis.  Scant erosions within the rectosigmoid area.  These were biopsied.  2, 3-5 mm sessile polyps were noted one in the descending colon and one in the rectum.  The larger one in the descending colon was removed with cold snare and the smaller one in the rectum was removed with cold biopsy forceps.   Random biopsies were obtained from the colon upon withdrawal of the scope including rectum.  A retroflex examination within the rectum revealed internal hemorrhoids.        The scope was then straightened, the lower GI tract was decompressed, and the scope was pulled out of the patient.  The patient tolerated the procedure well.  There were no immediate complications and the patient was transferred in stable condition for post procedure observation.      TECHNICAL DATA:   1. [place] prep score: 8 (3+2+3).    2. Anus to cecal time: 3  minutes.  3. Difficulty of examination:  Average.    4. Withdrawal time: 17 minutes.  5. Procedure time:  24 minutes  6. Retroflex examination in right colon: Yes.    7. Second look Rectum to cecum with decompression: Yes.    DIAGNOSES:    1. Scant left-sided diverticulosis.  2. Colon polyps.  2 were removed.  3-5 mm in size.  3. Scant erosions involving the rectosigmoid area.  4. Internal hemorrhoids.  5. Biopsies were also obtained from the terminal ileum and randomly from the colon including rectum.    RECOMMENDATIONS:     Dietary instructions.  Follow biopsies.    Follow-up: 3 to 4 weeks.      Followup colonoscopy: Depending on the biopsy results.          Thank you very much for letting me participate in the care of this patient. Please do not hesitate to call me if you have any questions.

## 2019-04-30 LAB
LAB AP CASE REPORT: NORMAL
PATH REPORT.ADDENDUM SPEC: NORMAL
PATH REPORT.FINAL DX SPEC: NORMAL

## 2020-11-19 NOTE — ANESTHESIA POSTPROCEDURE EVALUATION
Patient: Rain Barreto    Procedure Summary     Date:  03/21/19 Room / Location:  [facility] ENDOSCOPY 2 / [facility] ENDOSCOPY    Anesthesia Start:  0755 Anesthesia Stop:  0823    Procedure:  ESOPHAGOGASTRODUODENOSCOPY with cold biopsies and dilation (N/A Esophagus) Diagnosis:       Nausea      Dysphagia, unspecified type      Heartburn      (Nausea [R11.0])      (Dysphagia, unspecified type [R13.10])      (Heartburn [R12])    Surgeon:  Adán Pena MD Provider:  Luke Nuñez CRNA    Anesthesia Type:  MAC ASA Status:  2          Anesthesia Type: MAC  Last vitals  BP   104/67 (03/21/19 0928)   Temp   97.9 °F (36.6 °C) (03/21/19 0827)   Pulse   77 (03/21/19 0928)   Resp   18 (03/21/19 0928)     SpO2   98 % (03/21/19 0928)     Post Anesthesia Care and Evaluation    Patient location during evaluation: bedside  Patient participation: complete - patient participated  Level of consciousness: awake and alert  Pain score: 0  Pain management: satisfactory to patient  Airway patency: patent  Anesthetic complications: No anesthetic complications  PONV Status: none  Cardiovascular status: acceptable and hemodynamically stable  Respiratory status: acceptable  Hydration status: acceptable       Spouse notified.

## 2021-05-27 RX ORDER — ESTRADIOL 1 MG/1
1 TABLET ORAL DAILY
COMMUNITY
End: 2021-08-26

## 2021-05-27 RX ORDER — HYDROCHLOROTHIAZIDE 25 MG/1
25 TABLET ORAL DAILY
COMMUNITY
End: 2021-08-26

## 2021-06-29 ENCOUNTER — DOCUMENTATION (OUTPATIENT)
Dept: BARIATRICS/WEIGHT MGMT | Facility: CLINIC | Age: 36
End: 2021-06-29

## 2021-06-29 ENCOUNTER — OFFICE VISIT (OUTPATIENT)
Dept: PSYCHIATRY | Facility: CLINIC | Age: 36
End: 2021-06-29

## 2021-06-29 ENCOUNTER — OFFICE VISIT (OUTPATIENT)
Dept: BARIATRICS/WEIGHT MGMT | Facility: CLINIC | Age: 36
End: 2021-06-29

## 2021-06-29 VITALS
RESPIRATION RATE: 18 BRPM | DIASTOLIC BLOOD PRESSURE: 70 MMHG | BODY MASS INDEX: 36.25 KG/M2 | OXYGEN SATURATION: 97 % | WEIGHT: 192 LBS | SYSTOLIC BLOOD PRESSURE: 118 MMHG | HEIGHT: 61 IN | HEART RATE: 88 BPM | TEMPERATURE: 97.8 F

## 2021-06-29 DIAGNOSIS — R10.13 DYSPEPSIA: ICD-10-CM

## 2021-06-29 DIAGNOSIS — R06.2 WHEEZING: ICD-10-CM

## 2021-06-29 DIAGNOSIS — E10.69 TYPE 1 DIABETES MELLITUS WITH OTHER SPECIFIED COMPLICATION (HCC): ICD-10-CM

## 2021-06-29 DIAGNOSIS — R12 HEARTBURN: Chronic | ICD-10-CM

## 2021-06-29 DIAGNOSIS — E66.9 OBESITY, CLASS II, BMI 35-39.9: ICD-10-CM

## 2021-06-29 DIAGNOSIS — Z71.89 ENCOUNTER FOR PSYCHOLOGICAL ASSESSMENT PRIOR TO BARIATRIC SURGERY: ICD-10-CM

## 2021-06-29 DIAGNOSIS — Z63.0 MARITAL PROBLEMS: ICD-10-CM

## 2021-06-29 DIAGNOSIS — R53.83 FATIGUE, UNSPECIFIED TYPE: ICD-10-CM

## 2021-06-29 DIAGNOSIS — F41.9 MILD ANXIETY: ICD-10-CM

## 2021-06-29 DIAGNOSIS — E78.5 HYPERLIPIDEMIA, UNSPECIFIED HYPERLIPIDEMIA TYPE: Primary | ICD-10-CM

## 2021-06-29 DIAGNOSIS — F41.9 ANXIETY: ICD-10-CM

## 2021-06-29 DIAGNOSIS — R13.10 DYSPHAGIA, UNSPECIFIED TYPE: Chronic | ICD-10-CM

## 2021-06-29 DIAGNOSIS — E66.9 OBESITY (BMI 30-39.9): Primary | ICD-10-CM

## 2021-06-29 PROCEDURE — 99204 OFFICE O/P NEW MOD 45 MIN: CPT | Performed by: PHYSICIAN ASSISTANT

## 2021-06-29 PROCEDURE — 90791 PSYCH DIAGNOSTIC EVALUATION: CPT | Performed by: PSYCHOLOGIST

## 2021-06-29 RX ORDER — ATORVASTATIN CALCIUM 20 MG/1
20 TABLET, FILM COATED ORAL DAILY
COMMUNITY
End: 2022-04-26

## 2021-06-29 RX ORDER — DAPAGLIFLOZIN 10 MG/1
10 TABLET, FILM COATED ORAL DAILY
COMMUNITY
End: 2022-04-26

## 2021-06-29 SDOH — SOCIAL STABILITY - SOCIAL INSECURITY: PROBLEMS IN RELATIONSHIP WITH SPOUSE OR PARTNER: Z63.0

## 2021-06-29 NOTE — PROGRESS NOTES
PROGRESS NOTE    Data:    Rain Barreto is a 35 y.o. female who met with the undersigned for a scheduled individual outpatient therapy session from 1:40 - 2:20pm.      Clinical Maneuvering/Intervention:      The pt talked about struggling with obesity for several years. Despite trying different weight loss plans and diets, the pt reported being unsuccessful in losing weight. A psychological evaluation was conducted in order to assess past and current level of functioning. Areas assessed included, but were not limited to: perception of social support, perception of ability to face and deal with challenges in life (positive functioning), anxiety symptoms, depressive symptoms, perspective on beliefs/belief system, coping skills for stress, intelligence level, addiction issues, etc. Therapeutic rapport was established. Interventions conducted today were geared towards assessing the pt's readiness for weight loss surgery and identifying and psychological contraindications for undergoing such a major life change. Social support was deemed strong (specific to weight loss surgery/weight loss in this manner and in a general sense): friends and several family members. Current psychological struggles were deemed low, but included mild anxiety about some changes in life (weight loss surgery, job change, and her marriage possibly ending), while also voicing gratitude for many of these changes. She talked about how she can see herself enjoying life more without being , but does not want to rush the decision. She talked about reaching a point in life where she is ready to put more of her needs first, now that nearly all of her children have grown up/moved out. Coping skills for distress and related to undergoing a major life change such as weight loss surgery/weight loss were deemed strong and included courage to face major changes in life, knowing what she wants in life, and believing that she is not only doing the  right thing by having weight loss surgery, but that she will also be successful with it. She also talked about the many things she can do to ensure her success with the surgery, but also with enjoying life more, without being prompted. The pt endorsed having characteristics of readiness to undergo major life changes inherent in the journey of weight loss surgery. She talked about being tired of suffering with lack of weight loss via diets and with feeling held by by weight. The pt expressed gratitude for today's visit.    Mental Status Exam  Hygiene:  good  Dress: normal  Attitude:  cooperative and proactive  Motor Activity: normal  Speech: normal  Mood:   intent on losing weight/improving her health  Affect:  congruent  Thought Processes: normal  Thought Content:  normal  Suicidal Thoughts:  not endorsed  Homicidal Thoughts:  not endorsed  Crisis Safety Plan: not needed   Hallucinations:  none      Patient's Support Network Includes:  family, friends      Progress toward goal: there is evidence to suggest that she is taking measures to improve the quality of her life including seeking weight loss surgery.       Functional Status: moderate to high      Prognosis: good    Assessment      The pt presented to be struggling with mild anxiety related to life changes and obesity. Marital conflict tense to cause some emotional tension, but she also seems to know what she wants to do in order to either repair or leave the marriage over time. She otherwise presents as a fairly highly functioning person with many strong coping skills for stress.       From a psychological standpoint, the pt presents as a good candidate for bariatric surgery. She is motivated for the surgery, has showed readiness for the lifestyle change in terms of adjusting her eating habits, and seems to have appropriate expectations of how to prepare and how to live after surgery in order to lose weight successfully.    Plan      In order to diminish  symptoms of anxiety and improve her quality of life in general, the pt is to follow up with her bariatric surgeon in order to receive weight loss surgery as soon as feasible/appropriate and based on success with compliance to adhering to the proper diet.     Whit Bearden, PhD, LP

## 2021-06-29 NOTE — PROGRESS NOTES
"[facility] BARIATRIC SURGERY  [address]  [address]  [facility] [id number] [phone]      Patient  Name:  Rain Barreto  :  1985        Chief Complaint:  weight gain; unable to maintain weight loss    History of Present Illness:  Rain Barreto is a 35 y.o. female who presents today for evaluation, education and consultation regarding bariatric and metabolic surgery. The patient is interested in sleeve gastrectomy.     Rain has been overweight for at least 10 years, has been 35 pounds or more overweight for at least 10 years.      Previous diet attempts include: High Protein, Low Carbohydrate, Calorie Counting, Cabbage Soup, Fasting and Slim Fast; None; Ionamin/Adipex.  The most weight Rain lost was 10 pounds on diet but was only able to maintain that weight loss for a few months.  Her maximum lifetime weight is 225 pounds.    As above, patient has been overweight for many years, with numerous failed dietary/weight loss attempts.  She now has obesity related comorbidities and as such has decided to pursue weight loss surgery.     had lapband with Dr. Lopez in [place], says he did well initially  But has gained back most his weight. Has a friend s/p bypass who recommended that she come here.     H/o type 1 DM with dexcom and detachable pump, dx in her 20's. HLD. Denies HTN. Has allergy related wheezing/ SOA at time, uses inhaler prn. Former smoker.     GI: h/o dysphagia treated with EGD with dilatation with Dr. Manzanares 2019, says symptoms improved but have returned some. Heartburn is treated with zantac prn, \"always keeps tums close by\".  GB present without known eval, denies known issues. She does have chronic dyspepsia intermittent triggered by \"nerves\" or certain foods that has been attributed to colitis type issues. Known diverticulosis on last colonoscopy.     EGD with Dr. Manzanares 3/21/19 DIAGNOSES:     1. Erosive distal esophagitis. LA class " "A.  2. Esophageal rings.  Status post serial dilation to 18 mm.    3. Small sliding hiatal hernia less than 3 cm.    4. Erythematous-erosive gastritis.  5. Subtle scalloping within the second portion of duodenum.  Bx negative     All other past medical, surgical, social and family history have been obtained and discussed as pertinent to bariatric surgery as below.     Past Medical History:   Diagnosis Date   • Anxiety    • Diabetes mellitus (CMS/HCC)     Type I, has detachable pump. dx in 's   • Diverticulosis    • Dyspepsia    • Dysphagia    • Gallstones    • Heartburn     takes prn zantac and tums, last EGD 2019 witth dilatation   • HLD (hyperlipidemia)    • Hypertension     denies   • Kidney infection    • Kidney stone    • Migraine    • PONV (postoperative nausea and vomiting)    • Tattoos    • Wears dentures     upper   • Wheezing     \"seasonal allergies\" uses inhalers prn     Past Surgical History:   Procedure Laterality Date   •  SECTION  , ,    • COLONOSCOPY N/A 4/15/2019    Procedure: COLONOSCOPY W/ COLD FORCEP BIOPSIES; COLD SNARE POLYPECTOMY; COLD FORCEP POLYPECTOMY; RESOLUTION CLIP X1;  Surgeon: Adán Pena MD;  Location: [facility] ENDOSCOPY;  Service: Gastroenterology   • D & C HYSTEROSCOPY ENDOMETRIAL ABLATION     • ENDOSCOPY N/A 3/21/2019    Procedure: ESOPHAGOGASTRODUODENOSCOPY with cold biopsies and dilation;  Surgeon: Adán Pena MD;  Location: [facility] ENDOSCOPY;  Service: Gastroenterology   • TEETH EXTRACTION      dentures on upper plate   • TOTAL ABDOMINAL HYSTERECTOMY      low transverse   • TUBAL ABDOMINAL LIGATION         Allergies   Allergen Reactions   • Hydrocodone Itching and Rash   • Jardiance [Empagliflozin] Itching and Rash   • Latex Rash       Current Outpatient Medications:   •  atorvastatin (LIPITOR) 20 MG tablet, Take 20 mg by mouth Daily., Disp: , Rfl:   •  Black Cohosh 20 MG tablet, Take  by mouth., Disp: , Rfl:   •  Dapagliflozin Propanediol " (Farxiga) 10 MG tablet, Take 10 mg by mouth Daily., Disp: , Rfl:   •  Ergocalciferol (VITAMIN D2 PO), Take 50,000 Units by mouth 1 (One) Time Per Week., Disp: , Rfl:   •  estradiol (ESTRACE) 1 MG tablet, Take 1 mg by mouth Daily., Disp: , Rfl:   •  hydroCHLOROthiazide (HYDRODIURIL) 25 MG tablet, Take 25 mg by mouth Daily., Disp: , Rfl:   •  Insulin Glargine (BASAGLAR KWIKPEN SC), Inject 60 Units under the skin into the appropriate area as directed Daily., Disp: , Rfl:   •  naproxen (NAPROSYN) 500 MG tablet, Take 1,000 mg by mouth As Needed for Mild Pain ., Disp: , Rfl:   •  Probiotic capsule, Take 1 capsule by mouth Daily., Disp: 30 capsule, Rfl: 1  •  Ertugliflozin L-PyroglutamicAc (STEGLATRO) 15 MG tablet, Take 10 mg by mouth Every Morning., Disp: , Rfl:   •  metoclopramide (REGLAN) 5 MG tablet, Take 1/2 tablets by mouth 2 (Two) Times a Day Before Meals. (Patient taking differently: Take 2.5 mg by mouth Daily As Needed.), Disp: 30 tablet, Rfl: 0  •  ondansetron (ZOFRAN) 4 MG tablet, Take 4 mg by mouth Every 8 (Eight) Hours As Needed for Nausea., Disp: , Rfl:   •  pantoprazole (PROTONIX) 40 MG EC tablet, Take 1 tablet by mouth 30 minutes before breakfast daily. (Patient taking differently: Take 40 mg by mouth Daily. Take 1 tablet by mouth 30 minutes before breakfast daily.), Disp: 30 tablet, Rfl: 3    Social History     Socioeconomic History   • Marital status:      Spouse name: Not on file   • Number of children: Not on file   • Years of education: Not on file   • Highest education level: Not on file   Tobacco Use   • Smoking status: Former Smoker     Packs/day: 0.25     Types: Cigarettes     Start date:      Quit date:      Years since quittin.4   • Smokeless tobacco: Never Used   Vaping Use   • Vaping Use: Never used   Substance and Sexual Activity   • Alcohol use: Yes     Comment: Social   • Drug use: No   • Sexual activity: Defer     Family History   Problem Relation Age of Onset   •  Diabetes Other    • Colon cancer Paternal Uncle    • Colon cancer Maternal Grandmother    • Crohn's disease Maternal Grandmother    • Diabetes Maternal Grandmother    • Colon cancer Paternal Grandmother    • Other Paternal Grandmother         Colitis   • Diabetes Paternal Grandmother    • Colon cancer Paternal Grandfather    • Diabetes Paternal Grandfather    • Sleep apnea Paternal Grandfather    • Hypertension Mother    • Hypertension Father    • Diabetes Maternal Grandfather    • Hypertension Maternal Grandfather    • Cirrhosis Neg Hx    • Liver cancer Neg Hx    • Liver disease Neg Hx    • Ulcerative colitis Neg Hx    • Esophageal cancer Neg Hx    • Stomach cancer Neg Hx        Review of Systems:  Constitutional:  Reports fatigue, weight gain and denies fevers, chills.  HEENT:  denies headache, ear pain or loss of hearing, blurred or double vision, nasal discharge or sore throat.  Cardiovascular:  Reports HLD, edema and denies HTN, CAD, Atrial Fib, hx heart disease, heart murmur, hx MI, chest pain, palpitations, hx DVT.  Respiratory:  Reports shortness of breath, wheezing and denies sleep apnea, asthma, hx PE.  Gastrointestinal:  Reports dysphagia and denies vomiting, abdominal pain, IBS, diarrhea, constipation, melena, blood in stool, gallbladder issues, liver disease, hx pancreatitis.  Genitourinary:  Reports UTIsand denies history of  frequent UTI, incontinence, hematuria, dysuria, polyuria, polydipsia, renal insufficiency, renal failure.    Musculoskeletal:  Reports none and denies joint pain, fibromyalgia, arthritis and autoimmune disease.  Neurological:  Reports none and denies headaches, migraines, numbness /tingling, dizziness, confusion, seizure, stroke.  Psychiatric:  Reports hx depression, hx anxiety and denies bipolar disorder, suicidal ideation, hx suicide attempt, hx self injury, hx substance abuse, eating disorder.  Endocrine:  Reports diabetes and denies thyroid disease, gout.  Hematologic:   Reports none and denies bruising, bleeding disorder, hx anemia, hx blood transfusion.  Skin:  Reports none and denies rashes, hx MRSA.      Physical Exam:  Vital Signs:  Weight: 87.1 kg (192 lb)   Body mass index is 36.28 kg/m².  Temp: 97.8 °F (36.6 °C)   Heart Rate: 88   BP: 118/70     Physical Exam  Vitals reviewed.   Constitutional:       Appearance: She is well-developed. She is obese.   HENT:      Head: Normocephalic.      Comments: mask  Neck:      Thyroid: No thyromegaly.   Cardiovascular:      Rate and Rhythm: Normal rate and regular rhythm.      Heart sounds: Normal heart sounds.   Pulmonary:      Effort: Pulmonary effort is normal. No respiratory distress.      Breath sounds: Normal breath sounds. No wheezing.   Abdominal:      General: Bowel sounds are normal. There is no distension.      Palpations: Abdomen is soft.      Tenderness: There is no abdominal tenderness.      Comments: Low transverse  Dexcom/ insulin pump in place   Musculoskeletal:         General: Normal range of motion.      Cervical back: Normal range of motion and neck supple.   Skin:     General: Skin is warm and dry.   Neurological:      Mental Status: She is alert and oriented to person, place, and time.   Psychiatric:         Mood and Affect: Mood normal.         Behavior: Behavior normal.         Thought Content: Thought content normal.         Judgment: Judgment normal.         Patient Active Problem List   Diagnosis   • Left lower quadrant pain   • Constipation   • Bright red blood per rectum   • Nausea   • Dysphagia   • Heartburn   • Diarrhea   • Colon cancer screening   • HLD (hyperlipidemia)   • Dyspepsia   • Diverticulosis   • Wheezing   • Migraine   • Diabetes mellitus (CMS/HCC)   • Anxiety       Assessment:    Rain Barreto is a 35 y.o. year old female with medically complicated obesity pursuing sleeve gastrectomy.    Weight loss surgery is deemed medically necessary given the following obesity related comorbidities  including dyslipidemia, GERD, depression and DM type 1 with current Weight: 87.1 kg (192 lb) and Body mass index is 36.28 kg/m²..    Plan:  The consultation plan and program requirements were reviewed with the patient.  The patient has been advised that a letter of medical support must be obtained from her primary care physician or referring provider. A psychological evaluation will be arranged.  A nutritional evaluation will be performed.  The patient was advised to start a high protein and low carbohydrate diet.  Necessary lifestyle modifications were discussed.  Instructions on how to access FRANCISCA was given to the patient.  FRANCISCA is an internet based educational video that explains the surgical procedure chosen and answers basic questions regarding that procedure.     Patient's Body mass index is 36.28 kg/m². indicating that she is morbidly obese (BMI > 40 or > 35 with obesity - related health condition). Obesity-related health conditions include the following: as above. Obesity is worsening. BMI is is above average; BMI management plan is completed. We discussed consulting a Bariatric surgeon..        Preoperative testing will include: CBC, CMP, Lipids, TSH, HgA1C, EKG, CXR, Pulmonary Function Testing, Gallbladder Eval, Gastric Emptying Study and EGD     The risks and benefits of the upper endoscopy were discussed with the patient in detail and all questions were answered.  Possibility of perforation, bleeding, aspiration, and anesthesia reaction were reviewed.  Patient agrees to proceed.      Additional preop clearances required prior to surgery: Cardiology.      The patient has been educated on expected postoperative lifestyle changes, including commitment to high protein diet, vitamin regimen, and exercise program.  They are aware that support groups are encouraged for optimal weight loss results. Patient understands that bariatric surgery is not cosmetic surgery but rather a tool to help make a lifelong  commitment to lifestyle changes including diet, exercise, behavior modifications, and healthy habits. The procedure was discussed with the patient and all questions were answered. The importance of avoiding ASA/ NSAIDS/ steroids/ tobacco/ hormones/ immunomodulators perioperatively was discussed.         Kami Diaz PA-C

## 2021-06-29 NOTE — PROGRESS NOTES
"Weight Loss Surgery  Presurgical Nutrition Assessment     Rain Barreto  2021  [id number]  [id number]  1985  female    Surgery desired: Sleeve Gastrectomy    Ht 154.9 cm (61\"); Wt 87.1 kg (192 #); BMI 36.28  Past Medical History:   Diagnosis Date   • Anxiety    • Depression    • Diabetes mellitus (CMS/HCC)     Type I, has detachable pump. dx in 20's   • Diverticulosis    • Dyspepsia    • Dysphagia    • Gallstones    • Heartburn     takes prn zantac and tums, last EGD 2019 witth dilatation   • HLD (hyperlipidemia)    • Hypertension     denies   • Kidney infection    • Kidney stone    • Migraine    • PONV (postoperative nausea and vomiting)    • Tattoos    • Wears dentures     upper   • Wheezing     \"seasonal allergies\" uses inhalers prn     Past Surgical History:   Procedure Laterality Date   •  SECTION  , ,    • COLONOSCOPY N/A 4/15/2019    Procedure: COLONOSCOPY W/ COLD FORCEP BIOPSIES; COLD SNARE POLYPECTOMY; COLD FORCEP POLYPECTOMY; RESOLUTION CLIP X1;  Surgeon: Adán Pena MD;  Location: [facility] ENDOSCOPY;  Service: Gastroenterology   • D & C HYSTEROSCOPY ENDOMETRIAL ABLATION     • ENDOSCOPY N/A 3/21/2019    Procedure: ESOPHAGOGASTRODUODENOSCOPY with cold biopsies and dilation;  Surgeon: Adán Pena MD;  Location: [facility] ENDOSCOPY;  Service: Gastroenterology   • TEETH EXTRACTION      dentures on upper plate   • TOTAL ABDOMINAL HYSTERECTOMY      low transverse   • TUBAL ABDOMINAL LIGATION       Allergies   Allergen Reactions   • Hydrocodone Itching and Rash   • Jardiance [Empagliflozin] Itching and Rash   • Latex Rash       Current Outpatient Medications:   •  atorvastatin (LIPITOR) 20 MG tablet, Take 20 mg by mouth Daily., Disp: , Rfl:   •  Black Cohosh 20 MG tablet, Take  by mouth., Disp: , Rfl:   •  Dapagliflozin Propanediol (Farxiga) 10 MG tablet, Take 10 mg by mouth Daily., Disp: , Rfl:   •  Ergocalciferol (VITAMIN D2 PO), Take 50,000 Units by mouth 1 " (One) Time Per Week., Disp: , Rfl:   •  Ertugliflozin L-PyroglutamicAc (STEGLATRO) 15 MG tablet, Take 10 mg by mouth Every Morning., Disp: , Rfl:   •  estradiol (ESTRACE) 1 MG tablet, Take 1 mg by mouth Daily., Disp: , Rfl:   •  hydroCHLOROthiazide (HYDRODIURIL) 25 MG tablet, Take 25 mg by mouth Daily., Disp: , Rfl:   •  Insulin Glargine (BASAGLAR KWIKPEN SC), Inject 60 Units under the skin into the appropriate area as directed Daily., Disp: , Rfl:   •  metoclopramide (REGLAN) 5 MG tablet, Take 1/2 tablets by mouth 2 (Two) Times a Day Before Meals. (Patient taking differently: Take 2.5 mg by mouth Daily As Needed.), Disp: 30 tablet, Rfl: 0  •  naproxen (NAPROSYN) 500 MG tablet, Take 1,000 mg by mouth As Needed for Mild Pain ., Disp: , Rfl:   •  ondansetron (ZOFRAN) 4 MG tablet, Take 4 mg by mouth Every 8 (Eight) Hours As Needed for Nausea., Disp: , Rfl:   •  pantoprazole (PROTONIX) 40 MG EC tablet, Take 1 tablet by mouth 30 minutes before breakfast daily. (Patient taking differently: Take 40 mg by mouth Daily. Take 1 tablet by mouth 30 minutes before breakfast daily.), Disp: 30 tablet, Rfl: 3  •  Probiotic capsule, Take 1 capsule by mouth Daily., Disp: 30 capsule, Rfl: 1      Nutrition Assessment    Estimated energy needs:  1495 kcal    Estimated calories for weight loss:  1300 kcal    IBW (Pounds):  130 #        Excess body weight (Pounds):  60 #       Nutrition Recall  24 Hour recall: (B) (L) (D) -  Reviewed and discussed with patient.  Pt diagnosed as DM1 ~15 yrs ago she states. Has had decreasing a1c levels of 14, to 11, & is awaiting most recent one from last wk.  Has never had diabetes ed classes, but is open to go to them. Is followed by her very supportive PCP who referred pt to a trial study & is on glargine via insulin pump. Is anxious to learn if next a1c indicates better control.  Explained to pt that whatever diet recs have been made for diabetes mgt will take precedence over recs for LSG & that all  caregivers will work with her to ensure that she stays well nourished & is successful in wt loss effort.  She is not currently following a set therapeutic diabetes diet.  From 8-9 am, pt drinks a 24 oz mocha coffee /c [place] vanilla creamer, followed by 24 oz Zero sugar fruit punch, and eats a soft Granola breakfast bar chosen from cereal section of the grocery store. Lunch @ 1:30 pm = FF double CB, a small order of fries & 24 oz diet Pepsi.  Dinner @ 8 pm at [facility] = 1 fried fish filet, 1/4 c fries, 1/4 c stef slaw & 2 hush puppies. No food /p this, put pt drinks 32 oz tea and water @ 10 pm & afterwards.  Usually drinks 2-12 oz cans Zero sugar Pepsi or DP each night.  Diet lacks fruits & veggies, is high in FF entrees & sides. Pt to focus on ingesting adeq protein for wt mgt in 3 reg balanced meals  + 2-3 high protein snks qd.  To wean self off of soda. To check /c PCP re diabetes diet education.       Exercise  rarely      Education    Provided information packet re:  Sleeve Gastrectomy  1. Reviewed guidelines for higher protein, limited carbohydrate diet to promote weight loss.  Encouraged patient to incorporate these principles of healthy eating from now until approximately 2 weeks prior to bariatric surgery date, when an even lower carbohydrate “liver-shrinking” regimen will be followed. (Information sheet re pre-op diet given).  Explained that after recovery from surgery this diet will again be followed to ensure further loss and for weight maintenance.    2. Encouraged patient to choose an acceptable protein supplement powder or shake for post-surgery liquid diet.  Provided product guidelines and examples.    3. Explained importance of goal setting to help in changing eating behaviors that are not conducive to weight loss.  Targeted several on a worksheet which also included spaces for patient to work on issues specific to them.  4. Provided follow-up options for support, including contact information for  dietitians here, if desired.  Web-based support information and apps for smart phones and computers given.  Noted that monthly support group is offered at this clinic, and that support is associated with successful weight loss.    Recommend that team proceed with surgery and follow per protocol.      Nutrition Goals   Dietary Guidelines per information packet as described above  Protein goal:  grams per day   Carbohydrate goal:  100-140 grams per day  Eliminate soda, sweet tea, etc.     Exercise Goals  Continue current exercise routine   Add 15-30 minutes of activity per day as tolerated      Phylicia Gaviria RD  06/29/2021  16:17 EDT

## 2021-06-30 LAB
ALBUMIN SERPL-MCNC: 4.8 G/DL (ref 3.8–4.8)
ALBUMIN/GLOB SERPL: 1.7 {RATIO} (ref 1.2–2.2)
ALP SERPL-CCNC: 93 IU/L (ref 48–121)
ALT SERPL-CCNC: 18 IU/L (ref 0–32)
AST SERPL-CCNC: 20 IU/L (ref 0–40)
BASOPHILS # BLD AUTO: 0.1 X10E3/UL (ref 0–0.2)
BASOPHILS NFR BLD AUTO: 1 %
BILIRUB SERPL-MCNC: 0.4 MG/DL (ref 0–1.2)
BUN SERPL-MCNC: 13 MG/DL (ref 6–20)
BUN/CREAT SERPL: 18 (ref 9–23)
CALCIUM SERPL-MCNC: 9.8 MG/DL (ref 8.7–10.2)
CHLORIDE SERPL-SCNC: 97 MMOL/L (ref 96–106)
CHOLEST SERPL-MCNC: 161 MG/DL (ref 100–199)
CO2 SERPL-SCNC: 26 MMOL/L (ref 20–29)
CREAT SERPL-MCNC: 0.72 MG/DL (ref 0.57–1)
EOSINOPHIL # BLD AUTO: 0.1 X10E3/UL (ref 0–0.4)
EOSINOPHIL NFR BLD AUTO: 1 %
ERYTHROCYTE [DISTWIDTH] IN BLOOD BY AUTOMATED COUNT: 12.9 % (ref 11.7–15.4)
GLOBULIN SER CALC-MCNC: 2.9 G/DL (ref 1.5–4.5)
GLUCOSE SERPL-MCNC: 183 MG/DL (ref 65–99)
H PYLORI IGA SER-ACNC: <9 UNITS (ref 0–8.9)
H PYLORI IGG SER IA-ACNC: 0.35 INDEX VALUE (ref 0–0.79)
H PYLORI IGM SER-ACNC: <9 UNITS (ref 0–8.9)
HBA1C MFR BLD: 10 % (ref 4.8–5.6)
HCT VFR BLD AUTO: 47.5 % (ref 34–46.6)
HDLC SERPL-MCNC: 56 MG/DL
HGB BLD-MCNC: 15.7 G/DL (ref 11.1–15.9)
IMM GRANULOCYTES # BLD AUTO: 0 X10E3/UL (ref 0–0.1)
IMM GRANULOCYTES NFR BLD AUTO: 0 %
LDLC SERPL CALC-MCNC: 68 MG/DL (ref 0–99)
LYMPHOCYTES # BLD AUTO: 2 X10E3/UL (ref 0.7–3.1)
LYMPHOCYTES NFR BLD AUTO: 26 %
MCH RBC QN AUTO: 29.2 PG (ref 26.6–33)
MCHC RBC AUTO-ENTMCNC: 33.1 G/DL (ref 31.5–35.7)
MCV RBC AUTO: 89 FL (ref 79–97)
MONOCYTES # BLD AUTO: 0.5 X10E3/UL (ref 0.1–0.9)
MONOCYTES NFR BLD AUTO: 6 %
NEUTROPHILS # BLD AUTO: 5.2 X10E3/UL (ref 1.4–7)
NEUTROPHILS NFR BLD AUTO: 66 %
PLATELET # BLD AUTO: 264 X10E3/UL (ref 150–450)
POTASSIUM SERPL-SCNC: 3.6 MMOL/L (ref 3.5–5.2)
PROT SERPL-MCNC: 7.7 G/DL (ref 6–8.5)
RBC # BLD AUTO: 5.37 X10E6/UL (ref 3.77–5.28)
SODIUM SERPL-SCNC: 141 MMOL/L (ref 134–144)
TRIGL SERPL-MCNC: 230 MG/DL (ref 0–149)
TSH SERPL DL<=0.005 MIU/L-ACNC: 1.45 UIU/ML (ref 0.45–4.5)
VLDLC SERPL CALC-MCNC: 37 MG/DL (ref 5–40)
WBC # BLD AUTO: 7.8 X10E3/UL (ref 3.4–10.8)

## 2021-07-16 ENCOUNTER — APPOINTMENT (OUTPATIENT)
Dept: PREADMISSION TESTING | Facility: HOSPITAL | Age: 36
End: 2021-07-16

## 2021-07-19 ENCOUNTER — TELEPHONE (OUTPATIENT)
Dept: BARIATRICS/WEIGHT MGMT | Facility: CLINIC | Age: 36
End: 2021-07-19

## 2021-07-19 ENCOUNTER — OUTSIDE FACILITY SERVICE (OUTPATIENT)
Dept: BARIATRICS/WEIGHT MGMT | Facility: CLINIC | Age: 36
End: 2021-07-19

## 2021-07-19 ENCOUNTER — LAB REQUISITION (OUTPATIENT)
Dept: LAB | Facility: HOSPITAL | Age: 36
End: 2021-07-19

## 2021-07-19 DIAGNOSIS — K20.0 EOSINOPHILIC ESOPHAGITIS: ICD-10-CM

## 2021-07-19 DIAGNOSIS — R11.0 NAUSEA: Primary | ICD-10-CM

## 2021-07-19 DIAGNOSIS — R13.10 DYSPHAGIA, UNSPECIFIED TYPE: ICD-10-CM

## 2021-07-19 DIAGNOSIS — R12 HEARTBURN: ICD-10-CM

## 2021-07-19 PROCEDURE — 43239 EGD BIOPSY SINGLE/MULTIPLE: CPT | Performed by: SURGERY

## 2021-07-19 PROCEDURE — 88305 TISSUE EXAM BY PATHOLOGIST: CPT | Performed by: SURGERY

## 2021-07-19 NOTE — TELEPHONE ENCOUNTER
----- Message from Yunior Finnegan MD sent at 7/19/2021 11:32 AM EDT -----  I didn't see anything that Dr. Pena saw in 2019 - can you please order a barium UGI, thanks!      Yunior Finnegan MD  Joe, Kami GOLDSTEIN PA-C  Also - no bile in her duodenum and dyspeptic sx's - please do GB work up as well, thanks!

## 2021-07-19 NOTE — TELEPHONE ENCOUNTER
Notified pt that Dr. Finnegan would like an UGI for further evaluation, and she will be called for scheduling.  Pt verbalized understanding.

## 2021-07-20 LAB
CYTO UR: NORMAL
LAB AP CASE REPORT: NORMAL
LAB AP CLINICAL INFORMATION: NORMAL
PATH REPORT.FINAL DX SPEC: NORMAL
PATH REPORT.GROSS SPEC: NORMAL

## 2021-07-22 DIAGNOSIS — R12 HEARTBURN: Chronic | ICD-10-CM

## 2021-07-22 DIAGNOSIS — R13.10 DYSPHAGIA, UNSPECIFIED TYPE: Chronic | ICD-10-CM

## 2021-07-26 ENCOUNTER — HOSPITAL ENCOUNTER (OUTPATIENT)
Dept: ULTRASOUND IMAGING | Facility: HOSPITAL | Age: 36
Discharge: HOME | End: 2021-07-26

## 2021-07-26 ENCOUNTER — HOSPITAL ENCOUNTER (OUTPATIENT)
Dept: NUCLEAR MEDICINE | Facility: HOSPITAL | Age: 36
Discharge: HOME | End: 2021-07-26

## 2021-07-26 ENCOUNTER — LAB (OUTPATIENT)
Dept: LAB | Facility: HOSPITAL | Age: 36
End: 2021-07-26

## 2021-07-26 ENCOUNTER — HOSPITAL ENCOUNTER (OUTPATIENT)
Dept: PULMONOLOGY | Facility: HOSPITAL | Age: 36
Discharge: HOME | End: 2021-07-26

## 2021-07-26 ENCOUNTER — TRANSCRIBE ORDERS (OUTPATIENT)
Dept: LAB | Facility: HOSPITAL | Age: 36
End: 2021-07-26

## 2021-07-26 DIAGNOSIS — Z01.818 PRE-OP TESTING: ICD-10-CM

## 2021-07-26 DIAGNOSIS — R10.13 DYSPEPSIA: ICD-10-CM

## 2021-07-26 DIAGNOSIS — R06.2 WHEEZING: ICD-10-CM

## 2021-07-26 DIAGNOSIS — Z01.818 PRE-OP TESTING: Primary | ICD-10-CM

## 2021-07-26 LAB — SARS-COV-2 RNA NOSE QL NAA+PROBE: NOT DETECTED

## 2021-07-26 PROCEDURE — 94060 EVALUATION OF WHEEZING: CPT | Performed by: INTERNAL MEDICINE

## 2021-07-26 PROCEDURE — A9541 TC99M SULFUR COLLOID: HCPCS | Performed by: PHYSICIAN ASSISTANT

## 2021-07-26 PROCEDURE — C9803 HOPD COVID-19 SPEC COLLECT: HCPCS

## 2021-07-26 PROCEDURE — 0 TECHNETIUM SULFUR COLLOID: Performed by: PHYSICIAN ASSISTANT

## 2021-07-26 PROCEDURE — 94729 DIFFUSING CAPACITY: CPT

## 2021-07-26 PROCEDURE — 94060 EVALUATION OF WHEEZING: CPT

## 2021-07-26 PROCEDURE — 94726 PLETHYSMOGRAPHY LUNG VOLUMES: CPT

## 2021-07-26 PROCEDURE — 94729 DIFFUSING CAPACITY: CPT | Performed by: INTERNAL MEDICINE

## 2021-07-26 PROCEDURE — U0004 COV-19 TEST NON-CDC HGH THRU: HCPCS

## 2021-07-26 PROCEDURE — 76705 ECHO EXAM OF ABDOMEN: CPT

## 2021-07-26 PROCEDURE — 94726 PLETHYSMOGRAPHY LUNG VOLUMES: CPT | Performed by: INTERNAL MEDICINE

## 2021-07-26 PROCEDURE — 78264 GASTRIC EMPTYING IMG STUDY: CPT

## 2021-07-26 RX ADMIN — TECHNETIUM TC 99M SULFUR COLLOID 1 DOSE: KIT at 08:45

## 2021-07-28 ENCOUNTER — HOSPITAL ENCOUNTER (OUTPATIENT)
Dept: GENERAL RADIOLOGY | Facility: HOSPITAL | Age: 36
Discharge: HOME | End: 2021-07-28
Admitting: PHYSICIAN ASSISTANT

## 2021-07-28 DIAGNOSIS — R13.10 DYSPHAGIA, UNSPECIFIED TYPE: ICD-10-CM

## 2021-07-28 DIAGNOSIS — R12 HEARTBURN: ICD-10-CM

## 2021-07-28 DIAGNOSIS — R11.0 NAUSEA: ICD-10-CM

## 2021-07-28 PROCEDURE — 74240 X-RAY XM UPR GI TRC 1CNTRST: CPT

## 2021-07-30 ENCOUNTER — HOSPITAL ENCOUNTER (EMERGENCY)
Facility: HOSPITAL | Age: 36
Discharge: HOME | End: 2021-07-30
Attending: EMERGENCY MEDICINE

## 2021-07-30 VITALS
HEIGHT: 61 IN | DIASTOLIC BLOOD PRESSURE: 100 MMHG | HEART RATE: 88 BPM | WEIGHT: 192 LBS | SYSTOLIC BLOOD PRESSURE: 152 MMHG | OXYGEN SATURATION: 98 % | RESPIRATION RATE: 18 BRPM | BODY MASS INDEX: 36.25 KG/M2 | TEMPERATURE: 98.1 F

## 2021-07-30 PROCEDURE — 99211 OFF/OP EST MAY X REQ PHY/QHP: CPT | Performed by: EMERGENCY MEDICINE

## 2021-08-04 DIAGNOSIS — R10.13 DYSPEPSIA: Primary | ICD-10-CM

## 2021-08-26 ENCOUNTER — OFFICE VISIT (OUTPATIENT)
Dept: CARDIOLOGY | Facility: CLINIC | Age: 36
End: 2021-08-26

## 2021-08-26 VITALS
BODY MASS INDEX: 36.4 KG/M2 | HEART RATE: 90 BPM | SYSTOLIC BLOOD PRESSURE: 136 MMHG | WEIGHT: 192.8 LBS | TEMPERATURE: 96.8 F | HEIGHT: 61 IN | DIASTOLIC BLOOD PRESSURE: 84 MMHG

## 2021-08-26 DIAGNOSIS — E11.9 INSULIN DEPENDENT TYPE 2 DIABETES MELLITUS (HCC): ICD-10-CM

## 2021-08-26 DIAGNOSIS — Z79.4 INSULIN DEPENDENT TYPE 2 DIABETES MELLITUS (HCC): ICD-10-CM

## 2021-08-26 DIAGNOSIS — E88.81 METABOLIC SYNDROME: ICD-10-CM

## 2021-08-26 DIAGNOSIS — Z01.810 PRE-OPERATIVE CARDIOVASCULAR EXAMINATION: Primary | ICD-10-CM

## 2021-08-26 DIAGNOSIS — R01.1 MURMUR, CARDIAC: ICD-10-CM

## 2021-08-26 DIAGNOSIS — E78.00 HYPERCHOLESTEREMIA: ICD-10-CM

## 2021-08-26 PROBLEM — E10.9: Status: RESOLVED | Noted: 2021-08-26 | Resolved: 2021-08-26

## 2021-08-26 PROBLEM — E10.9 UNCOMPLICATED TYPE 1 DIABETES MELLITUS: Status: ACTIVE | Noted: 2021-08-26

## 2021-08-26 PROBLEM — E88.810 METABOLIC SYNDROME: Status: ACTIVE | Noted: 2021-08-26

## 2021-08-26 PROCEDURE — 99204 OFFICE O/P NEW MOD 45 MIN: CPT | Performed by: INTERNAL MEDICINE

## 2021-08-26 PROCEDURE — 93000 ELECTROCARDIOGRAM COMPLETE: CPT | Performed by: INTERNAL MEDICINE

## 2021-08-26 RX ORDER — ALBUTEROL SULFATE 90 UG/1
2 AEROSOL, METERED RESPIRATORY (INHALATION) EVERY 4 HOURS PRN
COMMUNITY
End: 2022-04-26

## 2021-08-26 RX ORDER — INSULIN LISPRO 100 [IU]/ML
INJECTION, SOLUTION INTRAVENOUS; SUBCUTANEOUS
COMMUNITY
End: 2022-05-02 | Stop reason: HOSPADM

## 2021-08-26 RX ORDER — MULTIPLE VITAMINS W/ MINERALS TAB 9MG-400MCG
1 TAB ORAL DAILY
COMMUNITY
End: 2022-04-26

## 2021-08-26 NOTE — PROGRESS NOTES
Chief Complaint   Patient presents with   • Establish Care     Patient referred per Dr Finnegan for cardiac clearance for Bariatric surgery.   • Diabetes     Has had Omnipod for the last 3 years, receiving Admelog.   • EKG     Had at [facility] in [place] 2019, attempting to obtain.   • Aspirin     Patient does not take.        CARDIAC COMPLAINTS  Pre OP      Subjective   Rain Barreto is a 35 y.o. female came in today for her initial cardiac evaluation.  She has history of type 1 diabetes who has significant metabolic syndrome.  She is now referred for cardiac clearance prior to bariatric surgery.  She denies having any chest pain.  She denies having any shortness of breath on day-to-day activities.  She denies having any palpitation or dizziness.  She denies having any syncopal episode.  She has been having problems with her blood sugar.  Her last A1c was elevated at 10.  She also has history of hypercholesterolemia for which she takes Lipitor and her last cholesterol is 161 but the triglyceride was elevated at 230 with the LDL of 68.  Her TSH was normal.  Her blood sugar was 183 with normal renal function and liver function.  Her blood count was 15.7 with a crit of 48.  She has no history of smoking now.  She used to smoke about quarter pack a day for 9 years.  She did quit in .  She does have a family history of diabetes, hypertension and hypercholesterolemia.  Her grandmother did have sleep apnea.          Past Surgical History:   Procedure Laterality Date   •  SECTION  , ,    • COLONOSCOPY N/A 4/15/2019    Procedure: COLONOSCOPY W/ COLD FORCEP BIOPSIES; COLD SNARE POLYPECTOMY; COLD FORCEP POLYPECTOMY; RESOLUTION CLIP X1;  Surgeon: Adán Pena MD;  Location: [facility] ENDOSCOPY;  Service: Gastroenterology   • D & C HYSTEROSCOPY ENDOMETRIAL ABLATION     • ENDOSCOPY N/A 3/21/2019    Procedure: ESOPHAGOGASTRODUODENOSCOPY with cold biopsies and dilation;  Surgeon: Adán ePna MD;   "Location: [facility] ENDOSCOPY;  Service: Gastroenterology   • TEETH EXTRACTION      dentures on upper plate   • TOTAL ABDOMINAL HYSTERECTOMY  2019    low transverse   • TUBAL ABDOMINAL LIGATION  2004       Current Outpatient Medications   Medication Sig Dispense Refill   • albuterol sulfate  (90 Base) MCG/ACT inhaler Inhale 2 puffs Every 4 (Four) Hours As Needed for Wheezing.     • atorvastatin (LIPITOR) 20 MG tablet Take 20 mg by mouth Daily.     • AZO CRANBERRY GUMMIES PO 2 gummies once daily     • Black Cohosh 20 MG tablet Take  by mouth Daily.     • Dapagliflozin Propanediol (Farxiga) 10 MG tablet Take 10 mg by mouth Daily.     • insulin lispro (ADMELOG) 100 UNIT/ML injection Per Omnipod     • multivitamin with minerals (WOMENS MULTIVITAMIN PO) Take 1 tablet by mouth Daily.     • naproxen (NAPROSYN) 500 MG tablet Take 1,000 mg by mouth As Needed for Mild Pain .     • ondansetron (ZOFRAN) 4 MG tablet Take 4 mg by mouth Every 8 (Eight) Hours As Needed for Nausea.     • Probiotic capsule Take 1 capsule by mouth Daily. 30 capsule 1   • metoclopramide (REGLAN) 5 MG tablet Take 1/2 tablets by mouth 2 (Two) Times a Day Before Meals. (Patient taking differently: Take 2.5 mg by mouth Daily As Needed.) 30 tablet 0     No current facility-administered medications for this visit.           ALLERGIES:  Hydrocodone, Jardiance [empagliflozin], Latex, and Trulicity [dulaglutide]    Past Medical History:   Diagnosis Date   • Acid reflux    • Anxiety    • Depression    • Diabetes mellitus (CMS/HCC)     Type I, has detachable pump. dx in 20's   • Diverticulosis    • Dyspepsia    • Dysphagia    • Gallstones    • Heartburn     takes prn zantac and tums, last EGD 2019 witth dilatation   • HLD (hyperlipidemia)    • Hypertension     denies   • Kidney infection    • Kidney stone    • Migraine    • PONV (postoperative nausea and vomiting)    • Tattoos    • Wears dentures     upper   • Wheezing     \"seasonal allergies\" uses inhalers " prn       Social History     Tobacco Use   Smoking Status Former Smoker   • Packs/day: 0.25   • Years: 9.00   • Pack years: 2.25   • Types: Cigarettes   • Quit date: 2011   • Years since quitting: 10.6   Smokeless Tobacco Never Used          Family History   Problem Relation Age of Onset   • Colon cancer Paternal Uncle    • Colon cancer Maternal Grandmother    • Crohn's disease Maternal Grandmother    • Diabetes Maternal Grandmother    • Colon cancer Paternal Grandmother    • Other Paternal Grandmother         Colitis   • Diabetes Paternal Grandmother    • Hyperlipidemia Paternal Grandmother    • Hypertension Paternal Grandmother    • Sleep apnea Paternal Grandmother    • Colon cancer Paternal Grandfather    • Diabetes Paternal Grandfather    • Hypertension Paternal Grandfather    • Hypertension Mother    • Cancer Mother    • Diabetes Maternal Grandfather    • Hypertension Maternal Grandfather    • Stroke Maternal Grandfather    • No Known Problems Brother    • No Known Problems Son    • No Known Problems Son    • No Known Problems Daughter    • No Known Problems Daughter        Review of Systems   Constitutional: Positive for malaise/fatigue. Negative for decreased appetite.   HENT: Negative for congestion and sore throat.    Eyes: Negative for blurred vision.   Cardiovascular: Negative for chest pain and palpitations.   Respiratory: Negative for shortness of breath and snoring.    Endocrine: Negative for cold intolerance and heat intolerance.   Hematologic/Lymphatic: Negative for adenopathy. Does not bruise/bleed easily.   Skin: Negative for itching, nail changes and skin cancer.   Musculoskeletal: Negative for arthritis and myalgias.   Gastrointestinal: Negative for abdominal pain, dysphagia and heartburn.   Genitourinary: Negative for bladder incontinence and frequency.   Neurological: Negative for dizziness, light-headedness, seizures and vertigo.   Psychiatric/Behavioral: Negative for altered mental status.  "  Allergic/Immunologic: Negative for environmental allergies and hives.       Diabetes- Yes  Thyroid- normal    Objective     /84 (BP Location: Right arm)   Pulse 90   Temp 96.8 °F (36 °C)   Ht 154.9 cm (61\")   Wt 87.5 kg (192 lb 12.8 oz)   BMI 36.43 kg/m²     Vitals and nursing note reviewed.   Constitutional:       Appearance: Healthy appearance.   Eyes:      Conjunctiva/sclera: Conjunctivae normal.      Pupils: Pupils are equal, round, and reactive to light.   HENT:      Head: Normocephalic.   Pulmonary:      Effort: Pulmonary effort is normal.      Breath sounds: Normal breath sounds.   Cardiovascular:      PMI at left midclavicular line. Normal rate. Regular rhythm.      Murmurs: There is a grade 3/6 high frequency blowing holosystolic murmur at the apex.   Abdominal:      General: Bowel sounds are normal.      Palpations: Abdomen is soft.   Musculoskeletal: Normal range of motion.      Cervical back: Normal range of motion and neck supple. Skin:     General: Skin is warm and dry.   Neurological:      Mental Status: Alert, oriented to person, place, and time and oriented to person, place and time.           ECG 12 Lead    Date/Time: 8/26/2021 1:01 PM  Performed by: Blake Chaudhari MD  Authorized by: Blake Chaudhari MD   Comparison: compared with previous ECG from 2/3/2021  Similar to previous ECG  Rhythm: sinus rhythm  Rate: normal  QRS axis: normal    Clinical impression: normal ECG              Assessment/Plan   Patient's Body mass index is 36.43 kg/m². indicating that she is obese (BMI >30). Obesity-related health conditions include the following: coronary heart disease and dyslipidemias. Obesity is newly identified. BMI is is above average; BMI management plan is completed. We discussed low calorie, low carb based diet program, portion control, increasing exercise and consulting a Bariatric surgeon..     Diagnoses and all orders for this visit:    1. Pre-operative cardiovascular " examination (Primary)  -     Adult Transthoracic Echo Complete W/ Cont if Necessary Per Protocol; Future    2. Metabolic syndrome    3. Murmur, cardiac  -     Adult Transthoracic Echo Complete W/ Cont if Necessary Per Protocol; Future    4. Hypercholesteremia    5. Insulin dependent type 2 diabetes mellitus (CMS/HCC)       On clinical examination her heart rate is upper limit of normal.  Her blood pressure is stable.  Her EKG shows sinus rhythm small QRS complex no significant ST-T changes seen.  Her clinical examination reveals a BMI of 36.  She does have a short 3/6 systolic murmur at the mitral area.  She has normal peripheral pulse and no pedal edema    Regarding the pre op evaluation.  She does have some risk factors which includes diabetes hypercholesterolemia and significant obesity.  She denies having any anginal symptoms at this time.  She does not have any symptoms of heart failure though her effort tolerance is little limited.  I advised her to undergo an echocardiogram to evaluate for LVH, valvular structures, PA pressure and also rule out any pericardial effusion.    Regarding her diabetes, her blood sugar is still poorly controlled even with insulin but she is also taking Farxiga.  It is still moderately elevated.  She is advised to talk to you regarding that.    She does have some gastroesophageal reflux for which she takes Reglan as needed basis but she is not taking any PPI at this time    Regarding her hypercholesterolemia, it seems to be very well controlled with Lipitor at 20 mg once a day.  Continue the same especially with having a normal liver function tests    Regarding the cardiac murmur, it appears to be more of a mitral regurgitation murmur but need to rule out any other valvular problem.  I scheduled her to undergo an echocardiogram to evaluate the valvular structures as well as the LV function.  If the EF is normal and if there is no significant valvular heart disease, then her risk of  developing any complication during surgery will be low.  Cardiac clearance will be given.    Based on the results, further recommendations will be made.             Electronically signed by Blake Chaudhari MD August 26, 2021 12:59 EDT

## 2021-08-26 NOTE — PATIENT INSTRUCTIONS
Mediterranean Diet  A Mediterranean diet refers to food and lifestyle choices that are based on the traditions of countries located on the [place]. This way of eating has been shown to help prevent certain conditions and improve outcomes for people who have chronic diseases, like kidney disease and heart disease.  What are tips for following this plan?  Lifestyle  · Cook and eat meals together with your family, when possible.  · Drink enough fluid to keep your urine clear or pale yellow.  · Be physically active every day. This includes:  ? Aerobic exercise like running or swimming.  ? Leisure activities like gardening, walking, or housework.  · Get 7-8 hours of sleep each night.  · If recommended by your health care provider, drink red wine in moderation. This means 1 glass a day for nonpregnant women and 2 glasses a day for men. A glass of wine equals 5 oz (150 mL).  Reading food labels    · Check the serving size of packaged foods. For foods such as rice and pasta, the serving size refers to the amount of cooked product, not dry.  · Check the total fat in packaged foods. Avoid foods that have saturated fat or trans fats.  · Check the ingredients list for added sugars, such as corn syrup.  Shopping  · At the grocery store, buy most of your food from the areas near the walls of the store. This includes:  ? Fresh fruits and vegetables (produce).  ? Grains, beans, nuts, and seeds. Some of these may be available in unpackaged forms or large amounts (in bulk).  ? Fresh seafood.  ? Poultry and eggs.  ? Low-fat dairy products.  · Buy whole ingredients instead of prepackaged foods.  · Buy fresh fruits and vegetables in-season from local farmers markets.  · Buy frozen fruits and vegetables in resealable bags.  · If you do not have access to quality fresh seafood, buy precooked frozen shrimp or canned fish, such as tuna, salmon, or sardines.  · Buy small amounts of raw or cooked vegetables, salads, or olives from  the deli or salad bar at your store.  · Stock your pantry so you always have certain foods on hand, such as olive oil, canned tuna, canned tomatoes, rice, pasta, and beans.  Cooking  · Cook foods with extra-virgin olive oil instead of using butter or other vegetable oils.  · Have meat as a side dish, and have vegetables or grains as your main dish. This means having meat in small portions or adding small amounts of meat to foods like pasta or stew.  · Use beans or vegetables instead of meat in common dishes like chili or lasagna.  · Ragland with different cooking methods. Try roasting or broiling vegetables instead of steaming or sautéeing them.  · Add frozen vegetables to soups, stews, pasta, or rice.  · Add nuts or seeds for added healthy fat at each meal. You can add these to yogurt, salads, or vegetable dishes.  · Marinate fish or vegetables using olive oil, lemon juice, garlic, and fresh herbs.  Meal planning    · Plan to eat 1 vegetarian meal one day each week. Try to work up to 2 vegetarian meals, if possible.  · Eat seafood 2 or more times a week.  · Have healthy snacks readily available, such as:  ? Vegetable sticks with hummus.  ? Greek yogurt.  ? Fruit and nut trail mix.  · Eat balanced meals throughout the week. This includes:  ? Fruit: 2-3 servings a day  ? Vegetables: 4-5 servings a day  ? Low-fat dairy: 2 servings a day  ? Fish, poultry, or lean meat: 1 serving a day  ? Beans and legumes: 2 or more servings a week  ? Nuts and seeds: 1-2 servings a day  ? Whole grains: 6-8 servings a day  ? Extra-virgin olive oil: 3-4 servings a day  · Limit red meat and sweets to only a few servings a month  What are my food choices?  · Mediterranean diet  ? Recommended  § Grains: Whole-grain pasta. Brown rice. Bulgar wheat. Polenta. Couscous. Whole-wheat bread. Oatmeal. Quinoa.  § Vegetables: Artichokes. Beets. Broccoli. Cabbage. Carrots. Eggplant. Green beans. Chard. Kale. Spinach. Onions. Leeks. Peas. Squash.  Tomatoes. Peppers. Radishes.  § Fruits: Apples. Apricots. Avocado. Berries. Bananas. Cherries. Dates. Figs. Grapes. Kel. Melon. Oranges. Peaches. Plums. Pomegranate.  § Meats and other protein foods: Beans. Almonds. Sunflower seeds. Pine nuts. Peanuts. Cod. [place]. Scallops. Shrimp. Tuna. Tilapia. Clams. Oysters. Eggs.  § Dairy: Low-fat milk. Cheese. Greek yogurt.  § Beverages: Water. Red wine. Herbal tea.  § Fats and oils: Extra virgin olive oil. Avocado oil. Grape seed oil.  § Sweets and desserts: Greek yogurt with honey. Baked apples. Poached pears. Trail mix.  § Seasoning and other foods: Basil. Cilantro. Coriander. Cumin. Mint. Parsley. Bruno. Rosemary. Tarragon. Garlic. Oregano. Thyme. Pepper. Balsalmic vinegar. Tahini. Hummus. Tomato sauce. Olives. Mushrooms.  ? Limit these  § Grains: Prepackaged pasta or rice dishes. Prepackaged cereal with added sugar.  § Vegetables: Deep fried potatoes (french fries).  § Fruits: Fruit canned in syrup.  § Meats and other protein foods: Beef. Pork. Lamb. Poultry with skin. Hot dogs. Vizcarra.  § Dairy: Ice cream. Sour cream. Whole milk.  § Beverages: Juice. Sugar-sweetened soft drinks. Beer. Liquor and spirits.  § Fats and oils: Butter. Canola oil. Vegetable oil. Beef fat (tallow). Lard.  § Sweets and desserts: Cookies. Cakes. Pies. Candy.  § Seasoning and other foods: Mayonnaise. Premade sauces and marinades.  The items listed may not be a complete list. Talk with your dietitian about what dietary choices are right for you.  Summary  · The Mediterranean diet includes both food and lifestyle choices.  · Eat a variety of fresh fruits and vegetables, beans, nuts, seeds, and whole grains.  · Limit the amount of red meat and sweets that you eat.  · Talk with your health care provider about whether it is safe for you to drink red wine in moderation. This means 1 glass a day for nonpregnant women and 2 glasses a day for men. A glass of wine equals 5 oz (150 mL).  This information  is not intended to replace advice given to you by your health care provider. Make sure you discuss any questions you have with your health care provider.  Document Revised: 08/17/2017 Document Reviewed: 08/10/2017  Elsevier Patient Education © 2020 [employer]

## 2021-09-03 ENCOUNTER — HOSPITAL ENCOUNTER (OUTPATIENT)
Dept: NUCLEAR MEDICINE | Facility: HOSPITAL | Age: 36
Discharge: HOME | End: 2021-09-03

## 2021-09-03 DIAGNOSIS — R10.13 DYSPEPSIA: ICD-10-CM

## 2021-09-03 PROCEDURE — 78227 HEPATOBIL SYST IMAGE W/DRUG: CPT | Performed by: RADIOLOGY

## 2021-09-03 PROCEDURE — 0 TECHNETIUM TC 99M MEBROFENIN KIT: Performed by: PHYSICIAN ASSISTANT

## 2021-09-03 PROCEDURE — A9537 TC99M MEBROFENIN: HCPCS | Performed by: PHYSICIAN ASSISTANT

## 2021-09-03 PROCEDURE — 25010000002 SINCALIDE PER 5 MCG: Performed by: PHYSICIAN ASSISTANT

## 2021-09-03 PROCEDURE — 78227 HEPATOBIL SYST IMAGE W/DRUG: CPT

## 2021-09-03 RX ORDER — KIT FOR THE PREPARATION OF TECHNETIUM TC 99M MEBROFENIN 45 MG/10ML
1 INJECTION, POWDER, LYOPHILIZED, FOR SOLUTION INTRAVENOUS
Status: COMPLETED | OUTPATIENT
Start: 2021-09-03 | End: 2021-09-03

## 2021-09-03 RX ADMIN — SINCALIDE 3.5 MCG: 5 INJECTION, POWDER, LYOPHILIZED, FOR SOLUTION INTRAVENOUS at 11:31

## 2021-09-03 RX ADMIN — MEBROFENIN 1 DOSE: 45 INJECTION, POWDER, LYOPHILIZED, FOR SOLUTION INTRAVENOUS at 10:41

## 2021-09-21 ENCOUNTER — HOSPITAL ENCOUNTER (OUTPATIENT)
Dept: CARDIOLOGY | Facility: HOSPITAL | Age: 36
Discharge: HOME | End: 2021-09-21
Admitting: INTERNAL MEDICINE

## 2021-09-21 DIAGNOSIS — Z01.810 PRE-OPERATIVE CARDIOVASCULAR EXAMINATION: ICD-10-CM

## 2021-09-21 DIAGNOSIS — R01.1 MURMUR, CARDIAC: ICD-10-CM

## 2021-09-21 LAB
BH CV ECHO MEAS - ACS: 2.1 CM
BH CV ECHO MEAS - AO MAX PG: 5.6 MMHG
BH CV ECHO MEAS - AO MEAN PG: 3 MMHG
BH CV ECHO MEAS - AO ROOT AREA (BSA CORRECTED): 1.5
BH CV ECHO MEAS - AO ROOT AREA: 6.2 CM^2
BH CV ECHO MEAS - AO ROOT DIAM: 2.8 CM
BH CV ECHO MEAS - AO V2 MAX: 118 CM/SEC
BH CV ECHO MEAS - AO V2 MEAN: 78.1 CM/SEC
BH CV ECHO MEAS - AO V2 VTI: 25.2 CM
BH CV ECHO MEAS - BSA(HAYCOCK): 2 M^2
BH CV ECHO MEAS - BSA: 1.9 M^2
BH CV ECHO MEAS - BZI_BMI: 36.3 KILOGRAMS/M^2
BH CV ECHO MEAS - BZI_METRIC_HEIGHT: 154.9 CM
BH CV ECHO MEAS - BZI_METRIC_WEIGHT: 87.1 KG
BH CV ECHO MEAS - EDV(CUBED): 86.4 ML
BH CV ECHO MEAS - EDV(MOD-SP4): 64.4 ML
BH CV ECHO MEAS - EDV(TEICH): 88.6 ML
BH CV ECHO MEAS - EF(CUBED): 83.2 %
BH CV ECHO MEAS - EF(MOD-SP4): 46 %
BH CV ECHO MEAS - EF(TEICH): 76.3 %
BH CV ECHO MEAS - EF_3D-VOL: 59 %
BH CV ECHO MEAS - ESV(CUBED): 14.5 ML
BH CV ECHO MEAS - ESV(MOD-SP4): 34.8 ML
BH CV ECHO MEAS - ESV(TEICH): 21 ML
BH CV ECHO MEAS - FS: 44.8 %
BH CV ECHO MEAS - IVS/LVPW: 0.81
BH CV ECHO MEAS - IVSD: 0.82 CM
BH CV ECHO MEAS - LA DIMENSION: 3.4 CM
BH CV ECHO MEAS - LA/AO: 1.2
BH CV ECHO MEAS - LV DIASTOLIC VOL/BSA (35-75): 34.7 ML/M^2
BH CV ECHO MEAS - LV IVRT: 0.11 SEC
BH CV ECHO MEAS - LV MASS(C)D: 133.2 GRAMS
BH CV ECHO MEAS - LV MASS(C)DI: 71.7 GRAMS/M^2
BH CV ECHO MEAS - LV SYSTOLIC VOL/BSA (12-30): 18.7 ML/M^2
BH CV ECHO MEAS - LVIDD: 4.4 CM
BH CV ECHO MEAS - LVIDS: 2.4 CM
BH CV ECHO MEAS - LVLD AP4: 6.5 CM
BH CV ECHO MEAS - LVLS AP4: 6.2 CM
BH CV ECHO MEAS - LVOT AREA (M): 4.5 CM^2
BH CV ECHO MEAS - LVOT AREA: 4.5 CM^2
BH CV ECHO MEAS - LVOT DIAM: 2.4 CM
BH CV ECHO MEAS - LVPWD: 1 CM
BH CV ECHO MEAS - MV A MAX VEL: 75 CM/SEC
BH CV ECHO MEAS - MV DEC SLOPE: 317 CM/SEC^2
BH CV ECHO MEAS - MV E MAX VEL: 87.8 CM/SEC
BH CV ECHO MEAS - MV E/A: 1.2
BH CV ECHO MEAS - RVDD: 2.7 CM
BH CV ECHO MEAS - SI(AO): 83.6 ML/M^2
BH CV ECHO MEAS - SI(CUBED): 38.7 ML/M^2
BH CV ECHO MEAS - SI(MOD-SP4): 15.9 ML/M^2
BH CV ECHO MEAS - SI(TEICH): 36.4 ML/M^2
BH CV ECHO MEAS - SV(AO): 155.2 ML
BH CV ECHO MEAS - SV(CUBED): 71.8 ML
BH CV ECHO MEAS - SV(MOD-SP4): 29.6 ML
BH CV ECHO MEAS - SV(TEICH): 67.6 ML
MAXIMAL PREDICTED HEART RATE: 183 BPM
STRESS TARGET HR: 156 BPM

## 2021-09-21 PROCEDURE — 93306 TTE W/DOPPLER COMPLETE: CPT | Performed by: INTERNAL MEDICINE

## 2021-09-21 PROCEDURE — 93306 TTE W/DOPPLER COMPLETE: CPT

## 2021-12-22 DIAGNOSIS — R06.00 DYSPNEA, UNSPECIFIED TYPE: Primary | ICD-10-CM

## 2021-12-22 DIAGNOSIS — R53.83 FATIGUE, UNSPECIFIED TYPE: ICD-10-CM

## 2022-01-03 NOTE — OP NOTE
PROCEDURE:  Upper Endoscopy with biopsies.    DATE OF PROCEDURE: March 21, 2019.    REFERRING PROVIDER:  Jesus Wheeler MD.     INSTRUMENT:  Olympus GIF H 190 video endoscope     INDICATIONS OF THE PROCEDURE:   This is a 33-year-old white female with history of recurrent nausea, dysphagia and reflux.  Mid and distal esophagus.     BIOPSIES: Second portion of duodenum.  Gastric antrum, body and fundus.       MEDICATIONS:  MAC.     PHOTOGRAPHS:  Photographs were included in the medical records.     CONSENT/PREPROCEDURE EVALUATION:  Risks, benefits, alternatives and options of the procedure including risks of anesthesia/sedation were discussed and informed consent was obtained prior to the procedure. History and physical examination were performed and nothing precluded the test.     REPORT:  The patient was placed in left lateral decubitus position. Once under the influence of IV sedation, the instrument was inserted into the mouth and esophagus was intubated under direct vision without difficulty.    Visualized portions of the laryngopharyngeal areas including partial view of the vocal cords did not reveal significant pathology.    Esophagus:  Z line was noted to be around  35 cm.  Erythematous distal esophagitis.  A small sliding hiatal hernia less than 3 cm was noted.  No Batres's esophagus was seen.     Stomach:  Antrum:  Erythematous-erosive gastritis.  Angulus, lesser and greater curves: Normal.  Retroflex examination: Sliding hiatal hernia.  Cardia and fundus:  Normal.     Body of the stomach: Erythematous-erosive gastritis.  Good distensibility of the stomach was achieved no giant folds were noted.   Biopsies were obtained from the gastric antrum,  body and fundus.    Pylorus and pyloric channel:  normal.     Duodenum:  Bulb: Normal.  Second portion: normal.  Subtle scalloping was seen in the second portion of duodenum.  Biopsies were obtained from the second portion of duodenum.    Intervention: Distal and  mid esophagus were dilated using 15-16.5-18 mm CRE balloon to 18 mm under vision without difficulty.  Some blood was noted no active bleeding was seen.     The upper GI tract was decompressed and the scope was pulled out of the patient. The patient tolerated the procedure well.     DIAGNOSES:     1. Erosive distal esophagitis. LA class A.  2. Esophageal rings.  Status post serial dilation to 18 mm.    3. Small sliding hiatal hernia less than 3 cm.    4. Erythematous-erosive gastritis.  5. Subtle scalloping within the second portion of duodenum.    RECOMMENDATIONS:  1.  Dietary instructions.  2.  Pantoprazole 40 mg 1 p.o. q.a.m. 1/2 hour before breakfast.  3.  Follow biopsies.  4.  Follow up in office.  5.  Discontinue Zantac.  6.  Zofran 4 mg tablet.  1-to 3 times a day by mouth as needed for nausea.    Thank you very much for letting me participate in the care of this patient. Please do not hesitate to call me if you have any questions.           no

## 2022-02-21 ENCOUNTER — LAB (OUTPATIENT)
Dept: LAB | Facility: HOSPITAL | Age: 37
End: 2022-02-21

## 2022-02-21 ENCOUNTER — HOSPITAL ENCOUNTER (OUTPATIENT)
Dept: GENERAL RADIOLOGY | Facility: HOSPITAL | Age: 37
Discharge: HOME | End: 2022-02-21

## 2022-02-21 DIAGNOSIS — R53.83 FATIGUE, UNSPECIFIED TYPE: ICD-10-CM

## 2022-02-21 DIAGNOSIS — R06.00 DYSPNEA, UNSPECIFIED TYPE: ICD-10-CM

## 2022-02-21 LAB
ALBUMIN SERPL-MCNC: 4.9 G/DL (ref 3.5–5.2)
ALBUMIN/GLOB SERPL: 2.1 G/DL
ALP SERPL-CCNC: 84 U/L (ref 39–117)
ALT SERPL W P-5'-P-CCNC: 18 U/L (ref 1–33)
ANION GAP SERPL CALCULATED.3IONS-SCNC: 13.5 MMOL/L (ref 5–15)
AST SERPL-CCNC: 13 U/L (ref 1–32)
BILIRUB SERPL-MCNC: 0.5 MG/DL (ref 0–1.2)
BUN SERPL-MCNC: 9 MG/DL (ref 6–20)
BUN/CREAT SERPL: 14.1 (ref 7–25)
CALCIUM SPEC-SCNC: 9.5 MG/DL (ref 8.6–10.5)
CHLORIDE SERPL-SCNC: 101 MMOL/L (ref 98–107)
CO2 SERPL-SCNC: 23.5 MMOL/L (ref 22–29)
CREAT SERPL-MCNC: 0.64 MG/DL (ref 0.57–1)
DEPRECATED RDW RBC AUTO: 40.3 FL (ref 37–54)
ERYTHROCYTE [DISTWIDTH] IN BLOOD BY AUTOMATED COUNT: 12.4 % (ref 12.3–15.4)
GFR SERPL CREATININE-BSD FRML MDRD: 104 ML/MIN/1.73
GLOBULIN UR ELPH-MCNC: 2.3 GM/DL
GLUCOSE SERPL-MCNC: 232 MG/DL (ref 65–99)
HCT VFR BLD AUTO: 43.8 % (ref 34–46.6)
HGB BLD-MCNC: 14.8 G/DL (ref 12–15.9)
MCH RBC QN AUTO: 30.2 PG (ref 26.6–33)
MCHC RBC AUTO-ENTMCNC: 33.8 G/DL (ref 31.5–35.7)
MCV RBC AUTO: 89.4 FL (ref 79–97)
PLATELET # BLD AUTO: 238 10*3/MM3 (ref 140–450)
PMV BLD AUTO: 10.6 FL (ref 6–12)
POTASSIUM SERPL-SCNC: 3.6 MMOL/L (ref 3.5–5.2)
PROT SERPL-MCNC: 7.2 G/DL (ref 6–8.5)
RBC # BLD AUTO: 4.9 10*6/MM3 (ref 3.77–5.28)
SODIUM SERPL-SCNC: 138 MMOL/L (ref 136–145)
WBC NRBC COR # BLD: 6.48 10*3/MM3 (ref 3.4–10.8)

## 2022-02-21 PROCEDURE — 80053 COMPREHEN METABOLIC PANEL: CPT

## 2022-02-21 PROCEDURE — 85027 COMPLETE CBC AUTOMATED: CPT

## 2022-02-21 PROCEDURE — 71046 X-RAY EXAM CHEST 2 VIEWS: CPT | Performed by: RADIOLOGY

## 2022-02-21 PROCEDURE — 36415 COLL VENOUS BLD VENIPUNCTURE: CPT

## 2022-02-21 PROCEDURE — 71046 X-RAY EXAM CHEST 2 VIEWS: CPT

## 2022-03-01 ENCOUNTER — CONSULT (OUTPATIENT)
Dept: BARIATRICS/WEIGHT MGMT | Facility: CLINIC | Age: 37
End: 2022-03-01

## 2022-03-01 VITALS
HEART RATE: 88 BPM | SYSTOLIC BLOOD PRESSURE: 122 MMHG | HEIGHT: 61 IN | WEIGHT: 192.5 LBS | OXYGEN SATURATION: 99 % | TEMPERATURE: 96.8 F | BODY MASS INDEX: 36.35 KG/M2 | DIASTOLIC BLOOD PRESSURE: 70 MMHG | RESPIRATION RATE: 18 BRPM

## 2022-03-01 DIAGNOSIS — K81.9 CHOLECYSTITIS WITHOUT CHOLELITHIASIS: ICD-10-CM

## 2022-03-01 DIAGNOSIS — E66.01 MORBID EXOGENOUS OBESITY: Primary | ICD-10-CM

## 2022-03-01 PROCEDURE — 99214 OFFICE O/P EST MOD 30 MIN: CPT | Performed by: SURGERY

## 2022-03-01 RX ORDER — GABAPENTIN 100 MG/1
600 CAPSULE ORAL ONCE
Status: CANCELLED | OUTPATIENT
Start: 2022-03-01 | End: 2022-03-01

## 2022-03-01 RX ORDER — SODIUM CHLORIDE 0.9 % (FLUSH) 0.9 %
3 SYRINGE (ML) INJECTION EVERY 12 HOURS SCHEDULED
Status: CANCELLED | OUTPATIENT
Start: 2022-03-01

## 2022-03-01 RX ORDER — ACETAMINOPHEN 500 MG
1000 TABLET ORAL ONCE
Status: CANCELLED | OUTPATIENT
Start: 2022-03-01 | End: 2022-03-01

## 2022-03-01 RX ORDER — SODIUM CHLORIDE 9 MG/ML
150 INJECTION, SOLUTION INTRAVENOUS CONTINUOUS
Status: CANCELLED | OUTPATIENT
Start: 2022-03-01

## 2022-03-01 RX ORDER — SODIUM CHLORIDE 0.9 % (FLUSH) 0.9 %
3-10 SYRINGE (ML) INJECTION AS NEEDED
Status: CANCELLED | OUTPATIENT
Start: 2022-03-01

## 2022-03-01 RX ORDER — CHLORHEXIDINE GLUCONATE 0.12 MG/ML
30 RINSE ORAL
Status: CANCELLED | OUTPATIENT
Start: 2022-03-01 | End: 2022-03-01

## 2022-03-01 RX ORDER — PANTOPRAZOLE SODIUM 40 MG/10ML
40 INJECTION, POWDER, LYOPHILIZED, FOR SOLUTION INTRAVENOUS ONCE
Status: CANCELLED | OUTPATIENT
Start: 2022-03-01 | End: 2022-03-01

## 2022-03-01 RX ORDER — SCOLOPAMINE TRANSDERMAL SYSTEM 1 MG/1
1 PATCH, EXTENDED RELEASE TRANSDERMAL ONCE
Status: CANCELLED | OUTPATIENT
Start: 2022-03-01 | End: 2022-03-01

## 2022-03-14 PROBLEM — K81.9 CHOLECYSTITIS WITHOUT CHOLELITHIASIS: Status: ACTIVE | Noted: 2022-03-14

## 2022-03-27 NOTE — PROGRESS NOTES
[facility] GROUP BARIATRIC SURGERY  [address]  [address]  [facility] [id number] [phone]      Patient  Name:  Rain Barreto  :  1985      Date of Visit: 3/1/22    Chief Complaint:  weight gain; unable to maintain weight loss.   Evaluate for possible metabolic and bariatric surgery    History of Present Illness:  Rain Barreto is a 36 y.o. female who presents today for evaluation, education and consultation regarding metabolic and bariatric surgery (MBS).  Since last seen 2021 she has roughly maintained her weight. The patient returns for final visit prior to metabolic and bariatric surgery specifically the sleeve gastrectomy.  Original intake evaluation Kami Diaz PA-C dated 2021 reviewed.    She notes the patient's maximum lifetime weight is 225 pounds and that her  had a lap band with Dr. Lopez in [place] and did well initially but gained back most of his weight and that she has a friend who had a gastric bypass who recommended she come here and that she has a history of type 1 diabetes diagnosed in her 20s, hyperlipidemia allergy related wheezing and dyspnea for which she uses an inhaler as needed and that she is a former smoker and that she has history of dysphagia treated with EGD with dilatation by Dr. Koenig in 2019 with improvement in her symptoms but the have returned to some degree and that she has heartburn treated with Zantac as needed and she always keeps Tums close by and has chronic dyspepsia which is intermittent and triggered by her nerves and certain foods and been attributed to colitis type issues and that she has known diverticulosis on her last colonoscopy and that EGD with Dr. Koenig on 3/21/2019 showed erosive distal esophagitis class a esophageal rings dilated to 18 mm small sliding hiatal hernia less than 3 cm erythematous erosive gastritis and subtle scalloping within the second portion of the duodenum biopsy negative.      The  "patient has had issues with morbid obesity for years and only temporary success with non-surgical methods of weight loss.  The patient is seeking LSG to help with the morbid obesity related conditions of anxiety, type 1 insulin-dependent diabetes mellitus, diverticulosis, dyspepsia, dysphagia, heartburn, chronic cholecystitis without cholelithiasis, hyperlipidemia, nephrolithiasis, migraine headaches, postoperative nausea and vomiting, upper dentures, wheezing, tobacco use disorder in remission.    36-year-old morbidly obese female from [facility].  Her  is with her for today's evaluation.  She does complain of chronic nausea without evidence of gastroparesis on her gastric emptying study and presumably related to gallbladder disease.  She is allergic to hydrocodone but says oxycodone and morphine are okay.  Given her type 1 insulin-dependent diabetes mellitus we did discuss Cate-en-Y gastric bypass versus sleeve gastrectomy and she prefers to proceed with the latter.  Her paternal grandmother had a history of DVTs.      Past Medical History:   Diagnosis Date   • Acid reflux    • Anxiety    • Depression    • Diabetes mellitus (HCC)     Type I, has detachable pump. dx in 20's   • Diverticulosis    • Dyspepsia    • Dysphagia    • Gallstones    • Heartburn     takes prn zantac and tums, last EGD  witth dilatation   • HLD (hyperlipidemia)    • Hypertension     denies   • Kidney infection    • Kidney stone    • Migraine    •  (normal spontaneous vaginal delivery)     First child preeclampsia   • PONV (postoperative nausea and vomiting)    • Tattoos    • Wears dentures     upper   • Wheezing     \"seasonal allergies\" uses inhalers prn     Past Surgical History:   Procedure Laterality Date   •  SECTION  , ,     No preeclampsia with her    • COLONOSCOPY N/A 04/15/2019    Procedure: COLONOSCOPY W/ COLD FORCEP BIOPSIES; COLD SNARE POLYPECTOMY; COLD FORCEP POLYPECTOMY; " RESOLUTION CLIP X1;  Surgeon: Adán ePna MD;  Location: [facility] END[facility]COPY;  Service: Gastroenterology   • D & C HYSTEROSCOPY ENDOMETRIAL ABLATION  2015   • ENDOSCOPY N/A 03/21/2019    Procedure: ESOPHAGOGASTRODUODENOSCOPY with cold biopsies and dilation;  Surgeon: Adán Pena MD;  Location: [facility] ENDOSCOPY;  Service: Gastroenterology   • TEETH EXTRACTION      dentures on upper plate   • TOTAL ABDOMINAL HYSTERECTOMY  2019    low transverse.  [facility] converted to open for scar tissue   • TUBAL ABDOMINAL LIGATION  2004       Allergies   Allergen Reactions   • Hydrocodone Itching and Rash   • Jardiance [Empagliflozin] Itching and Rash   • Latex Rash   • Trulicity [Dulaglutide] Nausea Only       Current Outpatient Medications:   •  albuterol sulfate  (90 Base) MCG/ACT inhaler, Inhale 2 puffs Every 4 (Four) Hours As Needed for Wheezing., Disp: , Rfl:   •  insulin lispro (ADMELOG) 100 UNIT/ML injection, Per Omnipod, Disp: , Rfl:   •  metoclopramide (REGLAN) 5 MG tablet, Take 1/2 tablets by mouth 2 (Two) Times a Day Before Meals. (Patient taking differently: Take 2.5 mg by mouth Daily As Needed.), Disp: 30 tablet, Rfl: 0  •  multivitamin with minerals (WOMENS MULTIVITAMIN PO), Take 1 tablet by mouth Daily., Disp: , Rfl:   •  Probiotic capsule, Take 1 capsule by mouth Daily., Disp: 30 capsule, Rfl: 1  •  atorvastatin (LIPITOR) 20 MG tablet, Take 20 mg by mouth Daily., Disp: , Rfl:   •  AZO CRANBERRY GUMMIES PO, 2 gummies once daily, Disp: , Rfl:   •  Black Cohosh 20 MG tablet, Take  by mouth Daily., Disp: , Rfl:   •  Dapagliflozin Propanediol (Farxiga) 10 MG tablet, Take 10 mg by mouth Daily., Disp: , Rfl:   •  naproxen (NAPROSYN) 500 MG tablet, Take 1,000 mg by mouth As Needed for Mild Pain ., Disp: , Rfl:   •  ondansetron (ZOFRAN) 4 MG tablet, Take 4 mg by mouth Every 8 (Eight) Hours As Needed for Nausea., Disp: , Rfl:     Social History     Socioeconomic History   • Marital status:     Tobacco Use   • Smoking status: Former Smoker     Packs/day: 0.25     Years: 9.00     Pack years: 2.25     Types: Cigarettes     Quit date:      Years since quittin.2   • Smokeless tobacco: Never Used   Vaping Use   • Vaping Use: Never used   Substance and Sexual Activity   • Alcohol use: Yes     Comment: Social   • Drug use: No   • Sexual activity: Defer     Family History   Problem Relation Age of Onset   • Colon cancer Paternal Uncle    • Colon cancer Maternal Grandmother    • Crohn's disease Maternal Grandmother    • Diabetes Maternal Grandmother    • Colon cancer Paternal Grandmother    • Other Paternal Grandmother         Colitis   • Diabetes Paternal Grandmother    • Hyperlipidemia Paternal Grandmother    • Hypertension Paternal Grandmother    • Sleep apnea Paternal Grandmother    • Colon cancer Paternal Grandfather    • Diabetes Paternal Grandfather    • Hypertension Paternal Grandfather    • Hypertension Mother    • Cancer Mother    • Diabetes Maternal Grandfather    • Hypertension Maternal Grandfather    • Stroke Maternal Grandfather    • No Known Problems Brother    • No Known Problems Son    • No Known Problems Son    • No Known Problems Daughter    • No Known Problems Daughter        Review of Systems   Constitutional: Positive for fatigue. Negative for chills, diaphoresis, fever and unexpected weight loss.   HENT: Negative for congestion and facial swelling.    Eyes: Negative for blurred vision, double vision and discharge.   Respiratory: Negative for chest tightness, shortness of breath and stridor.    Cardiovascular: Negative for chest pain, palpitations and leg swelling.   Gastrointestinal: Positive for nausea. Negative for blood in stool.   Endocrine: Negative for polydipsia.   Genitourinary: Negative for hematuria.   Skin: Negative for color change.   Allergic/Immunologic: Negative for immunocompromised state.   Neurological: Negative for confusion.   Psychiatric/Behavioral: Negative  for self-injury.       I have reviewed the ROS and confirm that it's accurate today.    Physical Exam:  Vital Signs:  Weight: 87.3 kg (192 lb 8 oz)   Body mass index is 36.37 kg/m².  Temp: 96.8 °F (36 °C)   Heart Rate: 88   BP: 122/70     Physical Exam  Vitals reviewed.   Constitutional:       Appearance: She is well-developed.   HENT:      Head: Normocephalic and atraumatic.      Nose:      Comments: mask  Eyes:      Conjunctiva/sclera: Conjunctivae normal.      Pupils: Pupils are equal, round, and reactive to light.   Neck:      Thyroid: No thyromegaly.      Vascular: No carotid bruit.      Trachea: No tracheal deviation.   Cardiovascular:      Rate and Rhythm: Normal rate and regular rhythm.      Heart sounds: Normal heart sounds.      Comments: No murmur appreciated  Pulmonary:      Effort: Pulmonary effort is normal. No respiratory distress.      Breath sounds: Normal breath sounds.   Abdominal:      General: There is no distension.      Palpations: Abdomen is soft.      Tenderness: There is no abdominal tenderness.      Comments: Lower abdominal transverse scar   Musculoskeletal:         General: No deformity. Normal range of motion.      Cervical back: Normal range of motion and neck supple.   Skin:     General: Skin is warm and dry.      Findings: No rash.   Neurological:      Mental Status: She is alert and oriented to person, place, and time.      Cranial Nerves: No cranial nerve deficit.      Coordination: Coordination normal.   Psychiatric:         Behavior: Behavior normal.         Thought Content: Thought content normal.         Judgment: Judgment normal.         Patient Active Problem List   Diagnosis   • Left lower quadrant pain   • Constipation   • Bright red blood per rectum   • Nausea   • Dysphagia   • Heartburn   • Diarrhea   • Colon cancer screening   • HLD (hyperlipidemia)   • Dyspepsia   • Diverticulosis   • Wheezing   • Migraine   • Diabetes mellitus (HCC)   • Anxiety   • Depression   •  Hypercholesteremia   • Murmur, cardiac   • Metabolic syndrome   • Pre-operative cardiovascular examination   • Morbid exogenous obesity (HCC)   • Cholecystitis without cholelithiasis       Assessment:    Rain Barreto is a 36 y.o. year old female with medically complicated obesity.    Metabolic and bariatric surgery is deemed medically necessary given the following obesity related comorbidities including anxiety, type 1 insulin-dependent diabetes mellitus, diverticulosis, dyspepsia, dysphagia, heartburn, chronic cholecystitis without cholelithiasis, hyperlipidemia, nephrolithiasis, migraine headaches, postoperative nausea and vomiting, upper dentures, wheezing, tobacco use disorder in remission with current Weight: 87.3 kg (192 lb 8 oz) and Body mass index is 36.37 kg/m²..    Encounter Diagnoses   Name Primary?   • Morbid exogenous obesity (HCC) Yes   • Cholecystitis without cholelithiasis       Patient is aware that surgery is a tool, and that weight loss and improvement in comorbidities is not guaranteed but only seen in the context of appropriate use, follow up and physical activity.    The patient was present for an approximately a 2.5 hour discussion of the purpose of MBS, how MBS is a tool to assist in achieving weight loss goals, the most common complications and how best to avoid them, and the strategies for short and long term weight loss and improvement in comorbidities.  Ample opportunity to discuss questions was available both in group and during the time of individual examination.    I reviewed her Sebastian report showing oxycodone x1.  CCK HIDA scan dated 9/3/2021 read by Dr. Ajit Wilkins showing an ejection fraction of 33%.  Labs dated 2/21/2022 showing an unremarkable CBC of note hemoglobin 14.8 unremarkable CMP except for glucose of 232.  Chest x-ray dated 2/21/2022 no active process.  EKG dated 8/26/2021 normal sinus rhythm with low QRS voltage in the precordial leads illegible initials.   Psychosocial evaluation dated 6/29/2021 Whit Bearden, PhD good candidate.  Dietitian evaluation dated 6/29/2021 Phylicia whitehead RD noting her diet lacks fruits and vegetables is high in Turkish dickey entries and sides.  EGD dated 7/19/2021 showing a fairly unremarkable examination no visible hiatal hernia minimal if any changes of eosinophilic esophagitis Z-line at 37 cm.  I noted at that time that her  is with her and that she is a type I diabetic and has cervical dysphagia and rarely has heartburn and that she told the staff she was keeps Tums close by for heartburn and her dyspepsia is triggered by certain foods and she had an upper endoscopy with Dr. Manzanares in [place] in March 2019 showing the findings discussed above.  Antrum biopsy showed reactive changes negative for H. pylori distal esophageal biopsies showed reactive changes otherwise unremarkable and mid esophageal biopsies showed reactive changes otherwise unremarkable.  Given the discrepancy I obtained an upper GI dated 7/28/2021 read by Venkatesh Lopez showing no hiatal hernia and no significant reflux.  Normal gastric emptying study dated 7/26/2021 read by Venkatesh Lopez MD.  Negative H. pylori serum testing dated 6/29/2021.  Labs from that day showing an unremarkable CMP except for glucose of 183 hemoglobin A1c 10.0 elevated triglycerides 230 normal TSH normal gallbladder ultrasound dated 7/26/2021 read by Venkatesh Lopez MD unremarkable pulmonary function tests read by Emily Pichardo on 7/31/2021 of note FVC 91 FEV1 93 DLCO 88 echocardiogram dated 9/21/2021 showing ejection fraction of 61 to 65% normal systolic and diastolic function trace to mild mitral valve regurgitation that the patient is cleared to undergo surgery with usual precautions.  Cardiology clearance dated 8/26/2021 Blake chen MD.  He noted a grade 3/6 high frequency blowing holosystolic murmur at the apex.  Please see scanned records that I have reviewed and signed off on today.   "All of this in addition to the patient's unique history and exam has been taken into consideration in determining their appropriate candidacy for MBS.    Complications  of laparoscopic/possible robotic gastric sleeve were discussed. The patient is well aware of the potential complications of surgery that include but not limited to bleeding, infections, deep venous thrombosis, pulmonary embolism, pulmonary complications such as pneumonia, cardiac events, hernias, small bowel obstruction, damage to the spleen or other organs, bowel injury, disfiguring scars, failure to lose weight, need for additional surgery, conversion to an open procedure, and death. Patient is also aware of complications which apply in this particular procedure that can include but are not limited to a \"leak\" at the staple line which in some instances may require conversion to gastric bypass.    The patient is aware if a hiatal hernia is encountered, it likely will be repaired.  R/B/A Rx to hiatal hernia repair were discussed as outlined in our long consent form.  Briefly risks in addition to those for LSG include recurrent hernia, IMANI, dysphagia, esophageal injury, pneumothorax, injury to the vagus nerves, injury to the thoracic duct, aorta or vena cava.    I discussed avoiding all tobacco products, nicotine,  and second hand smoke at least 2 weeks pre-operatively and 6 weeks post-operatively to minimize the risk of sleeve leak.  This included discussing the importance of avoiding even secondhand smoke as the risk of leak is increased.  Examples discussed:  Avoid going in a house or riding in a car where someone has previously smoked in the last 2 weeks and for 6 weeks postoperatively.  Avoid living in a house where someone smokes (even if it's in a separate room/patio/attached garage, etc.).   Avoid congregating with a group of people who are smoking even if it's outside.  It is OK to be around wood burning fires and barbecue.  I explained that " I do not know if marijuana has a same effects but my overall recommendation is to avoid it for 2 weeks prior in 6 weeks after surgery.     Discussed the risks, benefits and alternative therapies at great length as outlined in our extensive consent forms, consent videos, and educational teaching process under the direction of the center's .    A copy of the patient's signed informed consent is on file.    R/B/A Rx discussed to postop anticoagulation incl but not limited to bleeding, drug reaction, venothromboembolic events, etc. and the patient declined.  Even though her paternal grandmother had a history of DVTs I think this is not unreasonable given her BMI and lack of any venous thrombolic events with her previous surgeries or pregnancies.    R/b/a rx discussed including but not limited to bleeding, infection, bile leak, bile duct injury, bowel injury, pulm complications, venothromboembolic events, death etc and wishes to proceed with concomitant lap ella.  Aware may not alleviate symptoms and further work up may be warranted.        Plan: After evaluation today I think the patient is a reasonable candidate for laparoscopic sleeve gastrectomy, cholecystectomy, and EGD.  Hold anti-inflammatories 1 week prior in 6 weeks after surgery.  Once again not interested in Cate-en-Y gastric bypass at this time.  Other issues include anxiety, longstanding poorly controlled type 1 insulin-dependent diabetes mellitus, diverticulosis, dyspepsia, dysphagia, heartburn, chronic cholecystitis without cholelithiasis, hyperlipidemia, nephrolithiasis, migraine headaches, postoperative nausea and vomiting, upper dentures, wheezing, tobacco use disorder in remission.    Thank you Yolanda BOWER for the opportunity evaluate Mrs. Barreto.        Yunior Finnegan MD

## 2022-04-01 PROBLEM — Z79.4 INSULIN DEPENDENT TYPE 2 DIABETES MELLITUS: Status: RESOLVED | Noted: 2021-08-26 | Resolved: 2022-04-01

## 2022-04-01 PROBLEM — E11.9 INSULIN DEPENDENT TYPE 2 DIABETES MELLITUS: Status: RESOLVED | Noted: 2021-08-26 | Resolved: 2022-04-01

## 2022-04-25 ENCOUNTER — TELEMEDICINE (OUTPATIENT)
Dept: BARIATRICS/WEIGHT MGMT | Facility: CLINIC | Age: 37
End: 2022-04-25

## 2022-04-25 VITALS — BODY MASS INDEX: 36.35 KG/M2 | HEIGHT: 61 IN | WEIGHT: 192.5 LBS

## 2022-04-25 DIAGNOSIS — K81.9 CHOLECYSTITIS WITHOUT CHOLELITHIASIS: ICD-10-CM

## 2022-04-25 DIAGNOSIS — E66.01 MORBID EXOGENOUS OBESITY: Primary | ICD-10-CM

## 2022-04-25 PROCEDURE — 99214 OFFICE O/P EST MOD 30 MIN: CPT | Performed by: PHYSICIAN ASSISTANT

## 2022-04-25 RX ORDER — PROCHLORPERAZINE 25 MG/1
SUPPOSITORY RECTAL
COMMUNITY
Start: 2022-04-04 | End: 2022-05-09

## 2022-04-25 NOTE — INTERVAL H&P NOTE
"  H&P reviewed. The patient was examined and there are no changes to the H&P.       No recent shortness of breath or chest pains.      Blood pressure 115/72, pulse 83, temperature 98 °F (36.7 °C), temperature source Temporal, resp. rate 18, height 154.9 cm (61\"), weight 85.3 kg (188 lb), SpO2 97 %, not currently breastfeeding.    Chest: clear to auscultation.  Cardiac exam: No S3 no murmurs.   Abdomen: soft bowel sounds present nondistended nontender.        " Received bedside report from David Vasquez RN. Pt is resting in cart, VS updated in chart, waiting for swab results.

## 2022-04-26 ENCOUNTER — PRE-ADMISSION TESTING (OUTPATIENT)
Dept: PREADMISSION TESTING | Facility: HOSPITAL | Age: 37
End: 2022-04-26

## 2022-04-26 DIAGNOSIS — E66.01 MORBID EXOGENOUS OBESITY: ICD-10-CM

## 2022-04-26 DIAGNOSIS — K81.9 CHOLECYSTITIS WITHOUT CHOLELITHIASIS: ICD-10-CM

## 2022-04-26 LAB
DEPRECATED RDW RBC AUTO: 36.5 FL (ref 37–54)
ERYTHROCYTE [DISTWIDTH] IN BLOOD BY AUTOMATED COUNT: 11.9 % (ref 12.3–15.4)
HBA1C MFR BLD: 11.6 % (ref 4.8–5.6)
HCT VFR BLD AUTO: 40 % (ref 34–46.6)
HGB BLD-MCNC: 13.9 G/DL (ref 12–15.9)
MCH RBC QN AUTO: 29.6 PG (ref 26.6–33)
MCHC RBC AUTO-ENTMCNC: 34.8 G/DL (ref 31.5–35.7)
MCV RBC AUTO: 85.1 FL (ref 79–97)
MRSA DNA SPEC QL NAA+PROBE: POSITIVE
PLATELET # BLD AUTO: 247 10*3/MM3 (ref 140–450)
PMV BLD AUTO: 9.9 FL (ref 6–12)
QT INTERVAL: 378 MS
QTC INTERVAL: 439 MS
RBC # BLD AUTO: 4.7 10*6/MM3 (ref 3.77–5.28)
SARS-COV-2 RNA PNL SPEC NAA+PROBE: NOT DETECTED
WBC NRBC COR # BLD: 6.23 10*3/MM3 (ref 3.4–10.8)

## 2022-04-26 PROCEDURE — 85027 COMPLETE CBC AUTOMATED: CPT

## 2022-04-26 PROCEDURE — 93010 ELECTROCARDIOGRAM REPORT: CPT | Performed by: INTERNAL MEDICINE

## 2022-04-26 PROCEDURE — 36415 COLL VENOUS BLD VENIPUNCTURE: CPT

## 2022-04-26 PROCEDURE — 93005 ELECTROCARDIOGRAM TRACING: CPT

## 2022-04-26 PROCEDURE — U0004 COV-19 TEST NON-CDC HGH THRU: HCPCS

## 2022-04-26 PROCEDURE — 83036 HEMOGLOBIN GLYCOSYLATED A1C: CPT

## 2022-04-26 PROCEDURE — C9803 HOPD COVID-19 SPEC COLLECT: HCPCS

## 2022-04-26 PROCEDURE — 87641 MR-STAPH DNA AMP PROBE: CPT

## 2022-04-27 ENCOUNTER — TELEPHONE (OUTPATIENT)
Dept: BARIATRICS/WEIGHT MGMT | Facility: CLINIC | Age: 37
End: 2022-04-27

## 2022-04-27 NOTE — TELEPHONE ENCOUNTER
Patient called and stated that she tested positive for MRSA at PAT yesterday.  Patient is scheduled for surgery tomorrow.  Please advise.

## 2022-04-28 ENCOUNTER — HOSPITAL ENCOUNTER (INPATIENT)
Facility: HOSPITAL | Age: 37
LOS: 4 days | Discharge: HOME | End: 2022-05-02
Attending: SURGERY | Admitting: SURGERY

## 2022-04-28 ENCOUNTER — ANESTHESIA EVENT CONVERTED (OUTPATIENT)
Dept: ANESTHESIOLOGY | Facility: HOSPITAL | Age: 37
End: 2022-04-28

## 2022-04-28 ENCOUNTER — ANESTHESIA EVENT (OUTPATIENT)
Dept: PERIOP | Facility: HOSPITAL | Age: 37
End: 2022-04-28

## 2022-04-28 ENCOUNTER — ANESTHESIA (OUTPATIENT)
Dept: PERIOP | Facility: HOSPITAL | Age: 37
End: 2022-04-28

## 2022-04-28 DIAGNOSIS — E66.01 MORBID EXOGENOUS OBESITY: Primary | ICD-10-CM

## 2022-04-28 DIAGNOSIS — K81.9 CHOLECYSTITIS WITHOUT CHOLELITHIASIS: ICD-10-CM

## 2022-04-28 DIAGNOSIS — E10.9 TYPE 1 DIABETES MELLITUS WITHOUT COMPLICATION: ICD-10-CM

## 2022-04-28 LAB
GLUCOSE BLDC GLUCOMTR-MCNC: 116 MG/DL (ref 70–130)
GLUCOSE BLDC GLUCOMTR-MCNC: 221 MG/DL (ref 70–130)
GLUCOSE BLDC GLUCOMTR-MCNC: 230 MG/DL (ref 70–130)
GLUCOSE BLDC GLUCOMTR-MCNC: 68 MG/DL (ref 70–130)
GLUCOSE BLDC GLUCOMTR-MCNC: 73 MG/DL (ref 70–130)
GLUCOSE BLDC GLUCOMTR-MCNC: 96 MG/DL (ref 70–130)
GLUCOSE BLDC GLUCOMTR-MCNC: 98 MG/DL (ref 70–130)

## 2022-04-28 PROCEDURE — 25010000002 ONDANSETRON PER 1 MG: Performed by: SURGERY

## 2022-04-28 PROCEDURE — 0DB64Z3 EXCISION OF STOMACH, PERCUTANEOUS ENDOSCOPIC APPROACH, VERTICAL: ICD-10-PCS | Performed by: SURGERY

## 2022-04-28 PROCEDURE — 63710000001 INSULIN LISPRO (HUMAN) PER 5 UNITS: Performed by: SURGERY

## 2022-04-28 PROCEDURE — C1889 IMPLANT/INSERT DEVICE, NOC: HCPCS | Performed by: SURGERY

## 2022-04-28 PROCEDURE — 0FT44ZZ RESECTION OF GALLBLADDER, PERCUTANEOUS ENDOSCOPIC APPROACH: ICD-10-PCS | Performed by: SURGERY

## 2022-04-28 PROCEDURE — 0 MEPERIDINE PER 100 MG

## 2022-04-28 PROCEDURE — 25010000002 CEFAZOLIN IN DEXTROSE 2-4 GM/100ML-% SOLUTION: Performed by: SURGERY

## 2022-04-28 PROCEDURE — 25010000002 ONDANSETRON PER 1 MG: Performed by: NURSE ANESTHETIST, CERTIFIED REGISTERED

## 2022-04-28 PROCEDURE — 25010000002 DEXAMETHASONE SODIUM PHOSPHATE 10 MG/ML SOLUTION: Performed by: NURSE ANESTHETIST, CERTIFIED REGISTERED

## 2022-04-28 PROCEDURE — 25010000002 FENTANYL CITRATE (PF) 50 MCG/ML SOLUTION: Performed by: NURSE ANESTHETIST, CERTIFIED REGISTERED

## 2022-04-28 PROCEDURE — 88307 TISSUE EXAM BY PATHOLOGIST: CPT | Performed by: SURGERY

## 2022-04-28 PROCEDURE — 25010000002 DROPERIDOL PER 5 MG: Performed by: NURSE ANESTHETIST, CERTIFIED REGISTERED

## 2022-04-28 PROCEDURE — 47562 LAPAROSCOPIC CHOLECYSTECTOMY: CPT | Performed by: SURGERY

## 2022-04-28 PROCEDURE — 25010000002 NEOSTIGMINE 10 MG/10ML SOLUTION: Performed by: NURSE ANESTHETIST, CERTIFIED REGISTERED

## 2022-04-28 PROCEDURE — 82962 GLUCOSE BLOOD TEST: CPT

## 2022-04-28 PROCEDURE — 25010000002 VANCOMYCIN 10 G RECONSTITUTED SOLUTION: Performed by: PHYSICIAN ASSISTANT

## 2022-04-28 PROCEDURE — 94761 N-INVAS EAR/PLS OXIMETRY MLT: CPT

## 2022-04-28 PROCEDURE — 25010000002 HYDROMORPHONE 1 MG/ML SOLUTION: Performed by: SURGERY

## 2022-04-28 PROCEDURE — 0DJ08ZZ INSPECTION OF UPPER INTESTINAL TRACT, VIA NATURAL OR ARTIFICIAL OPENING ENDOSCOPIC: ICD-10-PCS | Performed by: SURGERY

## 2022-04-28 PROCEDURE — 25010000002 PROPOFOL 10 MG/ML EMULSION: Performed by: NURSE ANESTHETIST, CERTIFIED REGISTERED

## 2022-04-28 PROCEDURE — 25010000002 DEXAMETHASONE PER 1 MG: Performed by: NURSE ANESTHETIST, CERTIFIED REGISTERED

## 2022-04-28 PROCEDURE — 25010000002 ENOXAPARIN PER 10 MG: Performed by: SURGERY

## 2022-04-28 PROCEDURE — 43775 LAP SLEEVE GASTRECTOMY: CPT | Performed by: SURGERY

## 2022-04-28 PROCEDURE — 25010000002 HYDROMORPHONE 1 MG/ML SOLUTION

## 2022-04-28 PROCEDURE — 25010000002 GLUCAGON (HUMAN RECOMBINANT) 1 MG RECONSTITUTED SOLUTION: Performed by: NURSE ANESTHETIST, CERTIFIED REGISTERED

## 2022-04-28 PROCEDURE — 94799 UNLISTED PULMONARY SVC/PX: CPT

## 2022-04-28 PROCEDURE — 88304 TISSUE EXAM BY PATHOLOGIST: CPT | Performed by: SURGERY

## 2022-04-28 DEVICE — REINFORCED INTELLIGENT RELOAD, FOR USE WITH SIGNIA STAPLING SYSTEM
Type: IMPLANTABLE DEVICE | Site: STOMACH | Status: FUNCTIONAL
Brand: TRI-STAPLE 2.0

## 2022-04-28 DEVICE — BLACK REINFORCED INTELLIGENT RELOAD, FOR USE WITH SIGNIA STAPLING SYSTEM
Type: IMPLANTABLE DEVICE | Site: STOMACH | Status: FUNCTIONAL
Brand: TRI-STAPLE 2.0

## 2022-04-28 DEVICE — SEALANT WND FIBRIN TISSEEL VAPOR/HEAT/PREFIL/SYR 10ML: Type: IMPLANTABLE DEVICE | Site: ABDOMEN | Status: FUNCTIONAL

## 2022-04-28 DEVICE — LIGAMAX 5 MM ENDOSCOPIC MULTIPLE CLIP APPLIER
Type: IMPLANTABLE DEVICE | Site: ABDOMEN | Status: FUNCTIONAL
Brand: LIGAMAX

## 2022-04-28 RX ORDER — SODIUM CHLORIDE AND POTASSIUM CHLORIDE 150; 450 MG/100ML; MG/100ML
125 INJECTION, SOLUTION INTRAVENOUS CONTINUOUS
Status: DISCONTINUED | OUTPATIENT
Start: 2022-04-29 | End: 2022-04-29

## 2022-04-28 RX ORDER — FENTANYL CITRATE 50 UG/ML
50 INJECTION, SOLUTION INTRAMUSCULAR; INTRAVENOUS
Status: DISCONTINUED | OUTPATIENT
Start: 2022-04-28 | End: 2022-04-28 | Stop reason: HOSPADM

## 2022-04-28 RX ORDER — ACETAMINOPHEN 160 MG/5ML
1000 SOLUTION ORAL EVERY 8 HOURS SCHEDULED
Status: ACTIVE | OUTPATIENT
Start: 2022-04-28 | End: 2022-04-30

## 2022-04-28 RX ORDER — PROMETHAZINE HYDROCHLORIDE 25 MG/1
25 TABLET ORAL ONCE AS NEEDED
Status: DISCONTINUED | OUTPATIENT
Start: 2022-04-28 | End: 2022-04-28 | Stop reason: HOSPADM

## 2022-04-28 RX ORDER — CHLORHEXIDINE GLUCONATE 0.12 MG/ML
30 RINSE ORAL
Status: COMPLETED | OUTPATIENT
Start: 2022-04-28 | End: 2022-04-28

## 2022-04-28 RX ORDER — FAMOTIDINE 20 MG/1
20 TABLET, FILM COATED ORAL
Status: CANCELLED | OUTPATIENT
Start: 2022-04-28

## 2022-04-28 RX ORDER — DROPERIDOL 2.5 MG/ML
0.62 INJECTION, SOLUTION INTRAMUSCULAR; INTRAVENOUS AS NEEDED
Status: DISCONTINUED | OUTPATIENT
Start: 2022-04-28 | End: 2022-04-28 | Stop reason: HOSPADM

## 2022-04-28 RX ORDER — HYDRALAZINE HYDROCHLORIDE 20 MG/ML
5 INJECTION INTRAMUSCULAR; INTRAVENOUS
Status: DISCONTINUED | OUTPATIENT
Start: 2022-04-28 | End: 2022-04-28 | Stop reason: HOSPADM

## 2022-04-28 RX ORDER — PANTOPRAZOLE SODIUM 40 MG/10ML
40 INJECTION, POWDER, LYOPHILIZED, FOR SOLUTION INTRAVENOUS ONCE
Status: COMPLETED | OUTPATIENT
Start: 2022-04-28 | End: 2022-04-28

## 2022-04-28 RX ORDER — GABAPENTIN 250 MG/5ML
100 SOLUTION ORAL 3 TIMES DAILY
Status: ACTIVE | OUTPATIENT
Start: 2022-04-28 | End: 2022-04-30

## 2022-04-28 RX ORDER — DEXTROSE MONOHYDRATE 25 G/50ML
25 INJECTION, SOLUTION INTRAVENOUS
Status: DISCONTINUED | OUTPATIENT
Start: 2022-04-28 | End: 2022-05-02 | Stop reason: HOSPADM

## 2022-04-28 RX ORDER — PROMETHAZINE HYDROCHLORIDE 12.5 MG/1
12.5 TABLET ORAL EVERY 6 HOURS PRN
Status: DISCONTINUED | OUTPATIENT
Start: 2022-04-28 | End: 2022-05-02 | Stop reason: HOSPADM

## 2022-04-28 RX ORDER — ONDANSETRON 2 MG/ML
4 INJECTION INTRAMUSCULAR; INTRAVENOUS EVERY 6 HOURS PRN
Status: DISCONTINUED | OUTPATIENT
Start: 2022-04-28 | End: 2022-04-28

## 2022-04-28 RX ORDER — NEOSTIGMINE METHYLSULFATE 1 MG/ML
INJECTION, SOLUTION INTRAVENOUS AS NEEDED
Status: DISCONTINUED | OUTPATIENT
Start: 2022-04-28 | End: 2022-04-28 | Stop reason: SURG

## 2022-04-28 RX ORDER — ONDANSETRON 2 MG/ML
4 INJECTION INTRAMUSCULAR; INTRAVENOUS EVERY 6 HOURS PRN
Status: DISCONTINUED | OUTPATIENT
Start: 2022-04-28 | End: 2022-05-02 | Stop reason: HOSPADM

## 2022-04-28 RX ORDER — NICOTINE POLACRILEX 4 MG
15 LOZENGE BUCCAL
Status: DISCONTINUED | OUTPATIENT
Start: 2022-04-28 | End: 2022-05-02 | Stop reason: HOSPADM

## 2022-04-28 RX ORDER — LABETALOL HYDROCHLORIDE 5 MG/ML
5 INJECTION, SOLUTION INTRAVENOUS
Status: DISCONTINUED | OUTPATIENT
Start: 2022-04-28 | End: 2022-04-28 | Stop reason: HOSPADM

## 2022-04-28 RX ORDER — ALPRAZOLAM 0.25 MG/1
0.25 TABLET ORAL ONCE AS NEEDED
Status: DISCONTINUED | OUTPATIENT
Start: 2022-04-28 | End: 2022-05-02 | Stop reason: HOSPADM

## 2022-04-28 RX ORDER — SODIUM CHLORIDE 0.9 % (FLUSH) 0.9 %
3-10 SYRINGE (ML) INJECTION AS NEEDED
Status: DISCONTINUED | OUTPATIENT
Start: 2022-04-28 | End: 2022-04-28 | Stop reason: HOSPADM

## 2022-04-28 RX ORDER — DEXAMETHASONE SODIUM PHOSPHATE 10 MG/ML
INJECTION, SOLUTION INTRAMUSCULAR; INTRAVENOUS
Status: COMPLETED | OUTPATIENT
Start: 2022-04-28 | End: 2022-04-28

## 2022-04-28 RX ORDER — SODIUM CHLORIDE 0.9 % (FLUSH) 0.9 %
3 SYRINGE (ML) INJECTION EVERY 12 HOURS SCHEDULED
Status: DISCONTINUED | OUTPATIENT
Start: 2022-04-28 | End: 2022-04-28 | Stop reason: HOSPADM

## 2022-04-28 RX ORDER — ONDANSETRON 4 MG/1
4 TABLET, FILM COATED ORAL EVERY 6 HOURS PRN
Status: DISCONTINUED | OUTPATIENT
Start: 2022-05-02 | End: 2022-04-28

## 2022-04-28 RX ORDER — HYDROCODONE BITARTRATE AND ACETAMINOPHEN 5; 325 MG/1; MG/1
1 TABLET ORAL ONCE AS NEEDED
Status: DISCONTINUED | OUTPATIENT
Start: 2022-04-28 | End: 2022-04-28 | Stop reason: HOSPADM

## 2022-04-28 RX ORDER — SODIUM CHLORIDE 0.9 % (FLUSH) 0.9 %
10 SYRINGE (ML) INJECTION AS NEEDED
Status: DISCONTINUED | OUTPATIENT
Start: 2022-04-28 | End: 2022-04-28 | Stop reason: HOSPADM

## 2022-04-28 RX ORDER — MIDAZOLAM HYDROCHLORIDE 1 MG/ML
1 INJECTION INTRAMUSCULAR; INTRAVENOUS
Status: DISCONTINUED | OUTPATIENT
Start: 2022-04-28 | End: 2022-04-28 | Stop reason: HOSPADM

## 2022-04-28 RX ORDER — ALBUTEROL SULFATE 2.5 MG/3ML
2.5 SOLUTION RESPIRATORY (INHALATION) EVERY 4 HOURS PRN
Status: DISCONTINUED | OUTPATIENT
Start: 2022-04-28 | End: 2022-05-02 | Stop reason: HOSPADM

## 2022-04-28 RX ORDER — PANTOPRAZOLE SODIUM 40 MG/10ML
40 INJECTION, POWDER, LYOPHILIZED, FOR SOLUTION INTRAVENOUS
Status: DISCONTINUED | OUTPATIENT
Start: 2022-04-29 | End: 2022-05-02 | Stop reason: HOSPADM

## 2022-04-28 RX ORDER — CYANOCOBALAMIN 1000 UG/ML
1000 INJECTION, SOLUTION INTRAMUSCULAR; SUBCUTANEOUS ONCE
Status: COMPLETED | OUTPATIENT
Start: 2022-04-29 | End: 2022-04-29

## 2022-04-28 RX ORDER — LIDOCAINE HYDROCHLORIDE 10 MG/ML
INJECTION, SOLUTION EPIDURAL; INFILTRATION; INTRACAUDAL; PERINEURAL AS NEEDED
Status: DISCONTINUED | OUTPATIENT
Start: 2022-04-28 | End: 2022-04-28 | Stop reason: SURG

## 2022-04-28 RX ORDER — SODIUM CHLORIDE 9 MG/ML
INJECTION, SOLUTION INTRAVENOUS AS NEEDED
Status: DISCONTINUED | OUTPATIENT
Start: 2022-04-28 | End: 2022-04-28 | Stop reason: HOSPADM

## 2022-04-28 RX ORDER — ENOXAPARIN SODIUM 100 MG/ML
40 INJECTION SUBCUTANEOUS DAILY
Status: DISCONTINUED | OUTPATIENT
Start: 2022-04-29 | End: 2022-05-02 | Stop reason: HOSPADM

## 2022-04-28 RX ORDER — CEFAZOLIN SODIUM 2 G/100ML
2 INJECTION, SOLUTION INTRAVENOUS EVERY 8 HOURS
Status: COMPLETED | OUTPATIENT
Start: 2022-04-28 | End: 2022-04-29

## 2022-04-28 RX ORDER — SCOLOPAMINE TRANSDERMAL SYSTEM 1 MG/1
1 PATCH, EXTENDED RELEASE TRANSDERMAL ONCE
Status: DISCONTINUED | OUTPATIENT
Start: 2022-04-28 | End: 2022-04-28

## 2022-04-28 RX ORDER — GABAPENTIN 100 MG/1
100 CAPSULE ORAL 3 TIMES DAILY
Status: DISPENSED | OUTPATIENT
Start: 2022-04-28 | End: 2022-04-30

## 2022-04-28 RX ORDER — NALOXONE HCL 0.4 MG/ML
0.1 VIAL (ML) INJECTION
Status: DISCONTINUED | OUTPATIENT
Start: 2022-04-28 | End: 2022-05-02 | Stop reason: HOSPADM

## 2022-04-28 RX ORDER — ACETAMINOPHEN 500 MG
1000 TABLET ORAL ONCE
Status: COMPLETED | OUTPATIENT
Start: 2022-04-28 | End: 2022-04-28

## 2022-04-28 RX ORDER — ONDANSETRON 2 MG/ML
4 INJECTION INTRAMUSCULAR; INTRAVENOUS ONCE AS NEEDED
Status: DISCONTINUED | OUTPATIENT
Start: 2022-04-28 | End: 2022-04-28 | Stop reason: HOSPADM

## 2022-04-28 RX ORDER — SIMETHICONE 80 MG
80 TABLET,CHEWABLE ORAL 4 TIMES DAILY PRN
Status: DISCONTINUED | OUTPATIENT
Start: 2022-04-28 | End: 2022-05-02 | Stop reason: HOSPADM

## 2022-04-28 RX ORDER — BUPIVACAINE HYDROCHLORIDE 2.5 MG/ML
INJECTION, SOLUTION EPIDURAL; INFILTRATION; INTRACAUDAL
Status: COMPLETED | OUTPATIENT
Start: 2022-04-28 | End: 2022-04-28

## 2022-04-28 RX ORDER — LORAZEPAM 1 MG/1
1 TABLET ORAL EVERY 12 HOURS PRN
Status: DISCONTINUED | OUTPATIENT
Start: 2022-04-28 | End: 2022-05-02 | Stop reason: HOSPADM

## 2022-04-28 RX ORDER — SODIUM CHLORIDE 0.9 % (FLUSH) 0.9 %
10 SYRINGE (ML) INJECTION EVERY 12 HOURS SCHEDULED
Status: DISCONTINUED | OUTPATIENT
Start: 2022-04-28 | End: 2022-04-28 | Stop reason: HOSPADM

## 2022-04-28 RX ORDER — ONDANSETRON 4 MG/1
4 TABLET, FILM COATED ORAL EVERY 6 HOURS PRN
Status: DISCONTINUED | OUTPATIENT
Start: 2022-04-28 | End: 2022-05-02 | Stop reason: HOSPADM

## 2022-04-28 RX ORDER — DROPERIDOL 2.5 MG/ML
0.62 INJECTION, SOLUTION INTRAMUSCULAR; INTRAVENOUS ONCE AS NEEDED
Status: DISCONTINUED | OUTPATIENT
Start: 2022-04-28 | End: 2022-04-28 | Stop reason: HOSPADM

## 2022-04-28 RX ORDER — DEXAMETHASONE SODIUM PHOSPHATE 4 MG/ML
INJECTION, SOLUTION INTRA-ARTICULAR; INTRALESIONAL; INTRAMUSCULAR; INTRAVENOUS; SOFT TISSUE AS NEEDED
Status: DISCONTINUED | OUTPATIENT
Start: 2022-04-28 | End: 2022-04-28 | Stop reason: SURG

## 2022-04-28 RX ORDER — MEPERIDINE HYDROCHLORIDE 25 MG/ML
12.5 INJECTION INTRAMUSCULAR; INTRAVENOUS; SUBCUTANEOUS
Status: DISCONTINUED | OUTPATIENT
Start: 2022-04-28 | End: 2022-04-28 | Stop reason: HOSPADM

## 2022-04-28 RX ORDER — DIPHENHYDRAMINE HYDROCHLORIDE 50 MG/ML
25 INJECTION INTRAMUSCULAR; INTRAVENOUS EVERY 4 HOURS PRN
Status: DISCONTINUED | OUTPATIENT
Start: 2022-04-28 | End: 2022-05-02 | Stop reason: HOSPADM

## 2022-04-28 RX ORDER — HYDROMORPHONE HYDROCHLORIDE 2 MG/1
2 TABLET ORAL EVERY 4 HOURS PRN
Status: DISCONTINUED | OUTPATIENT
Start: 2022-04-28 | End: 2022-05-02 | Stop reason: HOSPADM

## 2022-04-28 RX ORDER — SODIUM CHLORIDE 9 MG/ML
150 INJECTION, SOLUTION INTRAVENOUS CONTINUOUS
Status: DISCONTINUED | OUTPATIENT
Start: 2022-04-28 | End: 2022-04-28

## 2022-04-28 RX ORDER — SODIUM CHLORIDE, SODIUM LACTATE, POTASSIUM CHLORIDE, CALCIUM CHLORIDE 600; 310; 30; 20 MG/100ML; MG/100ML; MG/100ML; MG/100ML
INJECTION, SOLUTION INTRAVENOUS CONTINUOUS PRN
Status: DISCONTINUED | OUTPATIENT
Start: 2022-04-28 | End: 2022-04-28 | Stop reason: SURG

## 2022-04-28 RX ORDER — CEFAZOLIN SODIUM 2 G/100ML
2 INJECTION, SOLUTION INTRAVENOUS ONCE
Status: COMPLETED | OUTPATIENT
Start: 2022-04-28 | End: 2022-04-28

## 2022-04-28 RX ORDER — ONDANSETRON 4 MG/1
4 TABLET, FILM COATED ORAL EVERY 4 HOURS PRN
Status: DISCONTINUED | OUTPATIENT
Start: 2022-04-28 | End: 2022-04-28 | Stop reason: SDUPTHER

## 2022-04-28 RX ORDER — OXYCODONE HYDROCHLORIDE 5 MG/1
5 TABLET ORAL EVERY 6 HOURS PRN
Status: DISCONTINUED | OUTPATIENT
Start: 2022-04-28 | End: 2022-05-02 | Stop reason: HOSPADM

## 2022-04-28 RX ORDER — ACETAMINOPHEN 500 MG
1000 TABLET ORAL EVERY 8 HOURS SCHEDULED
Status: DISPENSED | OUTPATIENT
Start: 2022-04-28 | End: 2022-04-30

## 2022-04-28 RX ORDER — METOCLOPRAMIDE HYDROCHLORIDE 5 MG/ML
10 INJECTION INTRAMUSCULAR; INTRAVENOUS EVERY 6 HOURS PRN
Status: DISCONTINUED | OUTPATIENT
Start: 2022-04-28 | End: 2022-05-02 | Stop reason: HOSPADM

## 2022-04-28 RX ORDER — PROPOFOL 10 MG/ML
VIAL (ML) INTRAVENOUS AS NEEDED
Status: DISCONTINUED | OUTPATIENT
Start: 2022-04-28 | End: 2022-04-28 | Stop reason: SURG

## 2022-04-28 RX ORDER — GLYCOPYRROLATE 0.2 MG/ML
INJECTION INTRAMUSCULAR; INTRAVENOUS AS NEEDED
Status: DISCONTINUED | OUTPATIENT
Start: 2022-04-28 | End: 2022-04-28 | Stop reason: SURG

## 2022-04-28 RX ORDER — SODIUM CHLORIDE, SODIUM LACTATE, POTASSIUM CHLORIDE, CALCIUM CHLORIDE 600; 310; 30; 20 MG/100ML; MG/100ML; MG/100ML; MG/100ML
150 INJECTION, SOLUTION INTRAVENOUS CONTINUOUS
Status: DISCONTINUED | OUTPATIENT
Start: 2022-04-28 | End: 2022-05-01

## 2022-04-28 RX ORDER — HYDRALAZINE HYDROCHLORIDE 20 MG/ML
10 INJECTION INTRAMUSCULAR; INTRAVENOUS
Status: DISCONTINUED | OUTPATIENT
Start: 2022-04-28 | End: 2022-05-02 | Stop reason: HOSPADM

## 2022-04-28 RX ORDER — ENOXAPARIN SODIUM 100 MG/ML
INJECTION SUBCUTANEOUS AS NEEDED
Status: DISCONTINUED | OUTPATIENT
Start: 2022-04-28 | End: 2022-04-28 | Stop reason: HOSPADM

## 2022-04-28 RX ORDER — PROCHLORPERAZINE MALEATE 10 MG
10 TABLET ORAL EVERY 6 HOURS PRN
Status: DISCONTINUED | OUTPATIENT
Start: 2022-04-28 | End: 2022-05-02 | Stop reason: HOSPADM

## 2022-04-28 RX ORDER — INSULIN LISPRO 100 [IU]/ML
0-9 INJECTION, SOLUTION INTRAVENOUS; SUBCUTANEOUS
Status: DISCONTINUED | OUTPATIENT
Start: 2022-04-28 | End: 2022-04-29

## 2022-04-28 RX ORDER — LORAZEPAM 2 MG/ML
0.5 INJECTION INTRAMUSCULAR EVERY 12 HOURS PRN
Status: DISCONTINUED | OUTPATIENT
Start: 2022-04-28 | End: 2022-05-02 | Stop reason: HOSPADM

## 2022-04-28 RX ORDER — PROMETHAZINE HYDROCHLORIDE 25 MG/1
25 SUPPOSITORY RECTAL ONCE AS NEEDED
Status: DISCONTINUED | OUTPATIENT
Start: 2022-04-28 | End: 2022-04-28 | Stop reason: HOSPADM

## 2022-04-28 RX ORDER — IPRATROPIUM BROMIDE AND ALBUTEROL SULFATE 2.5; .5 MG/3ML; MG/3ML
3 SOLUTION RESPIRATORY (INHALATION) ONCE AS NEEDED
Status: DISCONTINUED | OUTPATIENT
Start: 2022-04-28 | End: 2022-04-28 | Stop reason: HOSPADM

## 2022-04-28 RX ORDER — FENTANYL CITRATE 50 UG/ML
INJECTION, SOLUTION INTRAMUSCULAR; INTRAVENOUS AS NEEDED
Status: DISCONTINUED | OUTPATIENT
Start: 2022-04-28 | End: 2022-04-28 | Stop reason: SURG

## 2022-04-28 RX ORDER — HYDROMORPHONE HYDROCHLORIDE 1 MG/ML
0.5 INJECTION, SOLUTION INTRAMUSCULAR; INTRAVENOUS; SUBCUTANEOUS
Status: DISCONTINUED | OUTPATIENT
Start: 2022-04-28 | End: 2022-04-28 | Stop reason: HOSPADM

## 2022-04-28 RX ORDER — ENOXAPARIN SODIUM 100 MG/ML
40 INJECTION SUBCUTANEOUS ONCE
Status: DISCONTINUED | OUTPATIENT
Start: 2022-04-28 | End: 2022-04-28 | Stop reason: HOSPADM

## 2022-04-28 RX ORDER — LIDOCAINE HYDROCHLORIDE 10 MG/ML
0.5 INJECTION, SOLUTION EPIDURAL; INFILTRATION; INTRACAUDAL; PERINEURAL ONCE AS NEEDED
Status: COMPLETED | OUTPATIENT
Start: 2022-04-28 | End: 2022-04-28

## 2022-04-28 RX ORDER — DEXTROSE MONOHYDRATE 25 G/50ML
25 INJECTION, SOLUTION INTRAVENOUS ONCE
Status: COMPLETED | OUTPATIENT
Start: 2022-04-28 | End: 2022-04-28

## 2022-04-28 RX ORDER — SODIUM CHLORIDE, SODIUM LACTATE, POTASSIUM CHLORIDE, CALCIUM CHLORIDE 600; 310; 30; 20 MG/100ML; MG/100ML; MG/100ML; MG/100ML
9 INJECTION, SOLUTION INTRAVENOUS CONTINUOUS PRN
Status: CANCELLED | OUTPATIENT
Start: 2022-04-28

## 2022-04-28 RX ORDER — ONDANSETRON 2 MG/ML
INJECTION INTRAMUSCULAR; INTRAVENOUS AS NEEDED
Status: DISCONTINUED | OUTPATIENT
Start: 2022-04-28 | End: 2022-04-28 | Stop reason: SURG

## 2022-04-28 RX ORDER — ROCURONIUM BROMIDE 10 MG/ML
INJECTION, SOLUTION INTRAVENOUS AS NEEDED
Status: DISCONTINUED | OUTPATIENT
Start: 2022-04-28 | End: 2022-04-28 | Stop reason: SURG

## 2022-04-28 RX ORDER — MEPERIDINE HYDROCHLORIDE 25 MG/ML
INJECTION INTRAMUSCULAR; INTRAVENOUS; SUBCUTANEOUS
Status: COMPLETED
Start: 2022-04-28 | End: 2022-04-28

## 2022-04-28 RX ORDER — NALOXONE HCL 0.4 MG/ML
0.4 VIAL (ML) INJECTION AS NEEDED
Status: DISCONTINUED | OUTPATIENT
Start: 2022-04-28 | End: 2022-04-28 | Stop reason: HOSPADM

## 2022-04-28 RX ORDER — GABAPENTIN 300 MG/1
600 CAPSULE ORAL ONCE
Status: COMPLETED | OUTPATIENT
Start: 2022-04-28 | End: 2022-04-28

## 2022-04-28 RX ORDER — MAGNESIUM HYDROXIDE 1200 MG/15ML
LIQUID ORAL AS NEEDED
Status: DISCONTINUED | OUTPATIENT
Start: 2022-04-28 | End: 2022-04-28 | Stop reason: HOSPADM

## 2022-04-28 RX ADMIN — GABAPENTIN 100 MG: 100 CAPSULE ORAL at 20:13

## 2022-04-28 RX ADMIN — SODIUM CHLORIDE, POTASSIUM CHLORIDE, SODIUM LACTATE AND CALCIUM CHLORIDE 150 ML/HR: 600; 310; 30; 20 INJECTION, SOLUTION INTRAVENOUS at 23:58

## 2022-04-28 RX ADMIN — PANTOPRAZOLE SODIUM 40 MG: 40 INJECTION, POWDER, FOR SOLUTION INTRAVENOUS at 11:47

## 2022-04-28 RX ADMIN — AMISULPRIDE 10 MG: 2.5 INJECTION, SOLUTION INTRAVENOUS at 23:10

## 2022-04-28 RX ADMIN — ONDANSETRON 4 MG: 2 INJECTION INTRAMUSCULAR; INTRAVENOUS at 20:17

## 2022-04-28 RX ADMIN — HYDROMORPHONE HYDROCHLORIDE 1 MG: 1 INJECTION, SOLUTION INTRAMUSCULAR; INTRAVENOUS; SUBCUTANEOUS at 20:13

## 2022-04-28 RX ADMIN — SODIUM CHLORIDE 150 ML/HR: 9 INJECTION, SOLUTION INTRAVENOUS at 12:12

## 2022-04-28 RX ADMIN — GABAPENTIN 600 MG: 300 CAPSULE ORAL at 11:41

## 2022-04-28 RX ADMIN — HYDROMORPHONE HYDROCHLORIDE 0.5 MG: 1 INJECTION, SOLUTION INTRAMUSCULAR; INTRAVENOUS; SUBCUTANEOUS at 16:00

## 2022-04-28 RX ADMIN — THIAMINE HYDROCHLORIDE 100 ML/HR: 100 INJECTION, SOLUTION INTRAMUSCULAR; INTRAVENOUS at 23:58

## 2022-04-28 RX ADMIN — PROPOFOL 25 MCG/KG/MIN: 10 INJECTION, EMULSION INTRAVENOUS at 13:54

## 2022-04-28 RX ADMIN — Medication 10 ML: at 11:47

## 2022-04-28 RX ADMIN — CHLORHEXIDINE GLUCONATE 0.12% ORAL RINSE 15 ML: 1.2 LIQUID ORAL at 11:50

## 2022-04-28 RX ADMIN — ROCURONIUM BROMIDE 10 MG: 10 INJECTION, SOLUTION INTRAVENOUS at 14:00

## 2022-04-28 RX ADMIN — ACETAMINOPHEN 1000 MG: 500 TABLET ORAL at 11:41

## 2022-04-28 RX ADMIN — CEFAZOLIN SODIUM 2 G: 2 INJECTION, SOLUTION INTRAVENOUS at 21:06

## 2022-04-28 RX ADMIN — INSULIN LISPRO 4 UNITS: 100 INJECTION, SOLUTION INTRAVENOUS; SUBCUTANEOUS at 16:57

## 2022-04-28 RX ADMIN — BUPIVACAINE HYDROCHLORIDE 60 ML: 2.5 INJECTION, SOLUTION EPIDURAL; INFILTRATION; INTRACAUDAL; PERINEURAL at 13:38

## 2022-04-28 RX ADMIN — DROPERIDOL 0.62 MG: 2.5 INJECTION, SOLUTION INTRAMUSCULAR; INTRAVENOUS at 15:45

## 2022-04-28 RX ADMIN — SODIUM CHLORIDE 1000 ML: 9 INJECTION, SOLUTION INTRAVENOUS at 11:05

## 2022-04-28 RX ADMIN — SODIUM CHLORIDE, POTASSIUM CHLORIDE, SODIUM LACTATE AND CALCIUM CHLORIDE: 600; 310; 30; 20 INJECTION, SOLUTION INTRAVENOUS at 13:32

## 2022-04-28 RX ADMIN — MEPERIDINE HYDROCHLORIDE 12.5 MG: 25 INJECTION INTRAMUSCULAR; INTRAVENOUS; SUBCUTANEOUS at 15:50

## 2022-04-28 RX ADMIN — FENTANYL CITRATE 100 MCG: 50 INJECTION, SOLUTION INTRAMUSCULAR; INTRAVENOUS at 14:04

## 2022-04-28 RX ADMIN — CEFAZOLIN SODIUM 3 G: 2 INJECTION, SOLUTION INTRAVENOUS at 13:32

## 2022-04-28 RX ADMIN — DEXTROSE MONOHYDRATE 25 ML: 25 INJECTION, SOLUTION INTRAVENOUS at 12:22

## 2022-04-28 RX ADMIN — CHLORHEXIDINE GLUCONATE 0.12% ORAL RINSE 15 ML: 1.2 LIQUID ORAL at 11:45

## 2022-04-28 RX ADMIN — DEXAMETHASONE SODIUM PHOSPHATE 4 MG: 10 INJECTION, SOLUTION INTRAMUSCULAR; INTRAVENOUS at 13:38

## 2022-04-28 RX ADMIN — SODIUM CHLORIDE, POTASSIUM CHLORIDE, SODIUM LACTATE AND CALCIUM CHLORIDE: 600; 310; 30; 20 INJECTION, SOLUTION INTRAVENOUS at 15:05

## 2022-04-28 RX ADMIN — ACETAMINOPHEN 1000 MG: 500 TABLET ORAL at 21:06

## 2022-04-28 RX ADMIN — SODIUM CHLORIDE, POTASSIUM CHLORIDE, SODIUM LACTATE AND CALCIUM CHLORIDE 150 ML/HR: 600; 310; 30; 20 INJECTION, SOLUTION INTRAVENOUS at 16:45

## 2022-04-28 RX ADMIN — NEOSTIGMINE METHYLSULFATE 2.5 MG: 0.5 INJECTION INTRAVENOUS at 15:05

## 2022-04-28 RX ADMIN — DEXAMETHASONE SODIUM PHOSPHATE 8 MG: 4 INJECTION, SOLUTION INTRA-ARTICULAR; INTRALESIONAL; INTRAMUSCULAR; INTRAVENOUS; SOFT TISSUE at 13:40

## 2022-04-28 RX ADMIN — LIDOCAINE HYDROCHLORIDE 50 MG: 10 INJECTION, SOLUTION EPIDURAL; INFILTRATION; INTRACAUDAL; PERINEURAL at 13:37

## 2022-04-28 RX ADMIN — OXYCODONE 5 MG: 5 TABLET ORAL at 16:57

## 2022-04-28 RX ADMIN — LIDOCAINE HYDROCHLORIDE 0.2 ML: 10 INJECTION, SOLUTION EPIDURAL; INFILTRATION; INTRACAUDAL; PERINEURAL at 11:05

## 2022-04-28 RX ADMIN — PROPOFOL 250 MG: 10 INJECTION, EMULSION INTRAVENOUS at 13:37

## 2022-04-28 RX ADMIN — GLUCAGON HYDROCHLORIDE 1 MG: KIT at 14:11

## 2022-04-28 RX ADMIN — ONDANSETRON 4 MG: 2 INJECTION INTRAMUSCULAR; INTRAVENOUS at 15:05

## 2022-04-28 RX ADMIN — ROCURONIUM BROMIDE 50 MG: 10 INJECTION, SOLUTION INTRAVENOUS at 13:37

## 2022-04-28 RX ADMIN — SCOPALAMINE 1 PATCH: 1 PATCH, EXTENDED RELEASE TRANSDERMAL at 11:40

## 2022-04-28 RX ADMIN — VANCOMYCIN HYDROCHLORIDE 1250 MG: 10 INJECTION, POWDER, LYOPHILIZED, FOR SOLUTION INTRAVENOUS at 12:11

## 2022-04-28 RX ADMIN — GLYCOPYRROLATE 0.4 MG: 0.2 INJECTION INTRAMUSCULAR; INTRAVENOUS at 15:05

## 2022-04-29 ENCOUNTER — APPOINTMENT (OUTPATIENT)
Dept: GENERAL RADIOLOGY | Facility: HOSPITAL | Age: 37
End: 2022-04-29

## 2022-04-29 LAB
ALBUMIN SERPL-MCNC: 3.5 G/DL (ref 3.5–5.2)
ALBUMIN/GLOB SERPL: 1.3 G/DL
ALP SERPL-CCNC: 88 U/L (ref 39–117)
ALT SERPL W P-5'-P-CCNC: 99 U/L (ref 1–33)
ANION GAP SERPL CALCULATED.3IONS-SCNC: 18 MMOL/L (ref 5–15)
AST SERPL-CCNC: 89 U/L (ref 1–32)
BASOPHILS # BLD AUTO: 0.01 10*3/MM3 (ref 0–0.2)
BASOPHILS NFR BLD AUTO: 0.1 % (ref 0–1.5)
BILIRUB SERPL-MCNC: 0.6 MG/DL (ref 0–1.2)
BILIRUB UR QL STRIP: NEGATIVE
BUN SERPL-MCNC: 10 MG/DL (ref 6–20)
BUN/CREAT SERPL: 16.9 (ref 7–25)
CALCIUM SPEC-SCNC: 8.8 MG/DL (ref 8.6–10.5)
CHLORIDE SERPL-SCNC: 99 MMOL/L (ref 98–107)
CLARITY UR: CLEAR
CO2 SERPL-SCNC: 16 MMOL/L (ref 22–29)
COLOR UR: YELLOW
CREAT SERPL-MCNC: 0.59 MG/DL (ref 0.57–1)
DEPRECATED RDW RBC AUTO: 38.5 FL (ref 37–54)
EGFRCR SERPLBLD CKD-EPI 2021: 118.5 ML/MIN/1.73
EOSINOPHIL # BLD AUTO: 0 10*3/MM3 (ref 0–0.4)
EOSINOPHIL NFR BLD AUTO: 0 % (ref 0.3–6.2)
ERYTHROCYTE [DISTWIDTH] IN BLOOD BY AUTOMATED COUNT: 12 % (ref 12.3–15.4)
GLOBULIN UR ELPH-MCNC: 2.8 GM/DL
GLUCOSE BLDC GLUCOMTR-MCNC: 265 MG/DL (ref 70–130)
GLUCOSE BLDC GLUCOMTR-MCNC: 276 MG/DL (ref 70–130)
GLUCOSE BLDC GLUCOMTR-MCNC: 307 MG/DL (ref 70–130)
GLUCOSE BLDC GLUCOMTR-MCNC: 327 MG/DL (ref 70–130)
GLUCOSE SERPL-MCNC: 342 MG/DL (ref 65–99)
GLUCOSE UR STRIP-MCNC: ABNORMAL MG/DL
HCT VFR BLD AUTO: 35.9 % (ref 34–46.6)
HCT VFR BLD AUTO: 37.8 % (ref 34–46.6)
HGB BLD-MCNC: 12 G/DL (ref 12–15.9)
HGB BLD-MCNC: 12.8 G/DL (ref 12–15.9)
HGB UR QL STRIP.AUTO: NEGATIVE
IMM GRANULOCYTES # BLD AUTO: 0.04 10*3/MM3 (ref 0–0.05)
IMM GRANULOCYTES NFR BLD AUTO: 0.4 % (ref 0–0.5)
IRON 24H UR-MRATE: 68 MCG/DL (ref 37–145)
KETONES UR QL STRIP: ABNORMAL
LEUKOCYTE ESTERASE UR QL STRIP.AUTO: NEGATIVE
LYMPHOCYTES # BLD AUTO: 0.53 10*3/MM3 (ref 0.7–3.1)
LYMPHOCYTES NFR BLD AUTO: 5.1 % (ref 19.6–45.3)
MCH RBC QN AUTO: 29.6 PG (ref 26.6–33)
MCHC RBC AUTO-ENTMCNC: 33.9 G/DL (ref 31.5–35.7)
MCV RBC AUTO: 87.3 FL (ref 79–97)
MONOCYTES # BLD AUTO: 0.31 10*3/MM3 (ref 0.1–0.9)
MONOCYTES NFR BLD AUTO: 3 % (ref 5–12)
NEUTROPHILS NFR BLD AUTO: 9.49 10*3/MM3 (ref 1.7–7)
NEUTROPHILS NFR BLD AUTO: 91.4 % (ref 42.7–76)
NITRITE UR QL STRIP: NEGATIVE
NRBC BLD AUTO-RTO: 0 /100 WBC (ref 0–0.2)
PH UR STRIP.AUTO: 6 [PH] (ref 5–8)
PLATELET # BLD AUTO: 238 10*3/MM3 (ref 140–450)
PMV BLD AUTO: 9.8 FL (ref 6–12)
POTASSIUM SERPL-SCNC: 4.4 MMOL/L (ref 3.5–5.2)
PROT SERPL-MCNC: 6.3 G/DL (ref 6–8.5)
PROT UR QL STRIP: NEGATIVE
RBC # BLD AUTO: 4.33 10*6/MM3 (ref 3.77–5.28)
SODIUM SERPL-SCNC: 133 MMOL/L (ref 136–145)
SP GR UR STRIP: >=1.03 (ref 1–1.03)
UROBILINOGEN UR QL STRIP: ABNORMAL
WBC NRBC COR # BLD: 10.38 10*3/MM3 (ref 3.4–10.8)

## 2022-04-29 PROCEDURE — 99221 1ST HOSP IP/OBS SF/LOW 40: CPT | Performed by: NURSE PRACTITIONER

## 2022-04-29 PROCEDURE — 25010000002 CYANOCOBALAMIN PER 1000 MCG: Performed by: SURGERY

## 2022-04-29 PROCEDURE — 85014 HEMATOCRIT: CPT | Performed by: PHYSICIAN ASSISTANT

## 2022-04-29 PROCEDURE — 63710000001 INSULIN LISPRO (HUMAN) PER 5 UNITS: Performed by: PHYSICIAN ASSISTANT

## 2022-04-29 PROCEDURE — 25010000002 METOCLOPRAMIDE PER 10 MG: Performed by: SURGERY

## 2022-04-29 PROCEDURE — G0108 DIAB MANAGE TRN  PER INDIV: HCPCS

## 2022-04-29 PROCEDURE — 25010000002 THIAMINE PER 100 MG: Performed by: SURGERY

## 2022-04-29 PROCEDURE — 25010000002 CEFAZOLIN IN DEXTROSE 2-4 GM/100ML-% SOLUTION: Performed by: SURGERY

## 2022-04-29 PROCEDURE — 83540 ASSAY OF IRON: CPT | Performed by: SURGERY

## 2022-04-29 PROCEDURE — 74240 X-RAY XM UPR GI TRC 1CNTRST: CPT

## 2022-04-29 PROCEDURE — 99024 POSTOP FOLLOW-UP VISIT: CPT | Performed by: SURGERY

## 2022-04-29 PROCEDURE — 0 POTASSIUM CHLORIDE PER 2 MEQ: Performed by: SURGERY

## 2022-04-29 PROCEDURE — 80053 COMPREHEN METABOLIC PANEL: CPT | Performed by: SURGERY

## 2022-04-29 PROCEDURE — 63710000001 INSULIN LISPRO (HUMAN) PER 5 UNITS: Performed by: SURGERY

## 2022-04-29 PROCEDURE — 25010000002 HYDROMORPHONE 1 MG/ML SOLUTION: Performed by: SURGERY

## 2022-04-29 PROCEDURE — 63710000001 INSULIN DETEMIR PER 5 UNITS: Performed by: PHYSICIAN ASSISTANT

## 2022-04-29 PROCEDURE — 0 DIATRIZOATE MEGLUMINE & SODIUM PER 1 ML

## 2022-04-29 PROCEDURE — 85018 HEMOGLOBIN: CPT | Performed by: PHYSICIAN ASSISTANT

## 2022-04-29 PROCEDURE — 82962 GLUCOSE BLOOD TEST: CPT

## 2022-04-29 PROCEDURE — 25010000002 ENOXAPARIN PER 10 MG: Performed by: SURGERY

## 2022-04-29 PROCEDURE — 81003 URINALYSIS AUTO W/O SCOPE: CPT | Performed by: SURGERY

## 2022-04-29 PROCEDURE — 25010000002 ONDANSETRON PER 1 MG: Performed by: SURGERY

## 2022-04-29 PROCEDURE — 85025 COMPLETE CBC W/AUTO DIFF WBC: CPT | Performed by: SURGERY

## 2022-04-29 RX ORDER — INSULIN LISPRO 100 [IU]/ML
2 INJECTION, SOLUTION INTRAVENOUS; SUBCUTANEOUS
Status: DISCONTINUED | OUTPATIENT
Start: 2022-04-29 | End: 2022-04-29

## 2022-04-29 RX ORDER — INSULIN LISPRO 100 [IU]/ML
0-14 INJECTION, SOLUTION INTRAVENOUS; SUBCUTANEOUS
Status: DISCONTINUED | OUTPATIENT
Start: 2022-04-29 | End: 2022-05-02 | Stop reason: HOSPADM

## 2022-04-29 RX ORDER — SODIUM CHLORIDE 9 MG/ML
125 INJECTION, SOLUTION INTRAVENOUS CONTINUOUS
Status: DISCONTINUED | OUTPATIENT
Start: 2022-04-29 | End: 2022-05-02 | Stop reason: HOSPADM

## 2022-04-29 RX ADMIN — INSULIN DETEMIR 10 UNITS: 100 INJECTION, SOLUTION SUBCUTANEOUS at 17:09

## 2022-04-29 RX ADMIN — SODIUM CHLORIDE 125 ML/HR: 9 INJECTION, SOLUTION INTRAVENOUS at 17:11

## 2022-04-29 RX ADMIN — METOCLOPRAMIDE 10 MG: 5 INJECTION, SOLUTION INTRAMUSCULAR; INTRAVENOUS at 08:05

## 2022-04-29 RX ADMIN — SODIUM CHLORIDE 1000 ML: 9 INJECTION, SOLUTION INTRAVENOUS at 11:51

## 2022-04-29 RX ADMIN — INSULIN LISPRO 10 UNITS: 100 INJECTION, SOLUTION INTRAVENOUS; SUBCUTANEOUS at 17:09

## 2022-04-29 RX ADMIN — OXYCODONE 5 MG: 5 TABLET ORAL at 19:11

## 2022-04-29 RX ADMIN — GABAPENTIN 100 MG: 100 CAPSULE ORAL at 17:08

## 2022-04-29 RX ADMIN — INSULIN LISPRO 6 UNITS: 100 INJECTION, SOLUTION INTRAVENOUS; SUBCUTANEOUS at 11:25

## 2022-04-29 RX ADMIN — OXYCODONE 5 MG: 5 TABLET ORAL at 13:03

## 2022-04-29 RX ADMIN — ACETAMINOPHEN 1000 MG: 500 TABLET ORAL at 21:04

## 2022-04-29 RX ADMIN — OXYCODONE 5 MG: 5 TABLET ORAL at 04:02

## 2022-04-29 RX ADMIN — INSULIN LISPRO 8 UNITS: 100 INJECTION, SOLUTION INTRAVENOUS; SUBCUTANEOUS at 21:04

## 2022-04-29 RX ADMIN — ACETAMINOPHEN 1000 MG: 500 TABLET ORAL at 13:03

## 2022-04-29 RX ADMIN — PANTOPRAZOLE SODIUM 40 MG: 40 INJECTION, POWDER, FOR SOLUTION INTRAVENOUS at 05:19

## 2022-04-29 RX ADMIN — ONDANSETRON 4 MG: 2 INJECTION INTRAMUSCULAR; INTRAVENOUS at 11:51

## 2022-04-29 RX ADMIN — HYDROMORPHONE HYDROCHLORIDE 1 MG: 1 INJECTION, SOLUTION INTRAMUSCULAR; INTRAVENOUS; SUBCUTANEOUS at 11:40

## 2022-04-29 RX ADMIN — SIMETHICONE 80 MG: 80 TABLET, CHEWABLE ORAL at 05:19

## 2022-04-29 RX ADMIN — GABAPENTIN 100 MG: 100 CAPSULE ORAL at 08:05

## 2022-04-29 RX ADMIN — INSULIN LISPRO 7 UNITS: 100 INJECTION, SOLUTION INTRAVENOUS; SUBCUTANEOUS at 08:05

## 2022-04-29 RX ADMIN — SODIUM CHLORIDE, POTASSIUM CHLORIDE, SODIUM LACTATE AND CALCIUM CHLORIDE 150 ML/HR: 600; 310; 30; 20 INJECTION, SOLUTION INTRAVENOUS at 06:56

## 2022-04-29 RX ADMIN — SIMETHICONE 80 MG: 80 TABLET, CHEWABLE ORAL at 13:09

## 2022-04-29 RX ADMIN — CYANOCOBALAMIN 1000 MCG: 1000 INJECTION, SOLUTION INTRAMUSCULAR; SUBCUTANEOUS at 08:04

## 2022-04-29 RX ADMIN — POTASSIUM CHLORIDE AND SODIUM CHLORIDE 125 ML/HR: 450; 150 INJECTION, SOLUTION INTRAVENOUS at 10:15

## 2022-04-29 RX ADMIN — HYDROMORPHONE HYDROCHLORIDE 2 MG: 2 TABLET ORAL at 08:26

## 2022-04-29 RX ADMIN — HYDROMORPHONE HYDROCHLORIDE 1 MG: 1 INJECTION, SOLUTION INTRAMUSCULAR; INTRAVENOUS; SUBCUTANEOUS at 14:36

## 2022-04-29 RX ADMIN — GABAPENTIN 100 MG: 100 CAPSULE ORAL at 21:04

## 2022-04-29 RX ADMIN — HYDROMORPHONE HYDROCHLORIDE 2 MG: 2 TABLET ORAL at 17:19

## 2022-04-29 RX ADMIN — CEFAZOLIN SODIUM 2 G: 2 INJECTION, SOLUTION INTRAVENOUS at 03:58

## 2022-04-29 RX ADMIN — HYDROMORPHONE HYDROCHLORIDE 2 MG: 2 TABLET ORAL at 23:26

## 2022-04-29 RX ADMIN — ACETAMINOPHEN 1000 MG: 500 TABLET ORAL at 05:19

## 2022-04-30 LAB
ALBUMIN SERPL-MCNC: 3 G/DL (ref 3.5–5.2)
ALBUMIN/GLOB SERPL: 1.2 G/DL
ALP SERPL-CCNC: 114 U/L (ref 39–117)
ALT SERPL W P-5'-P-CCNC: 141 U/L (ref 1–33)
ANION GAP SERPL CALCULATED.3IONS-SCNC: 8 MMOL/L (ref 5–15)
AST SERPL-CCNC: 144 U/L (ref 1–32)
BASOPHILS # BLD AUTO: 0.02 10*3/MM3 (ref 0–0.2)
BASOPHILS NFR BLD AUTO: 0.3 % (ref 0–1.5)
BILIRUB SERPL-MCNC: 0.8 MG/DL (ref 0–1.2)
BUN SERPL-MCNC: 10 MG/DL (ref 6–20)
BUN/CREAT SERPL: 20.8 (ref 7–25)
CALCIUM SPEC-SCNC: 8.2 MG/DL (ref 8.6–10.5)
CHLORIDE SERPL-SCNC: 102 MMOL/L (ref 98–107)
CO2 SERPL-SCNC: 25 MMOL/L (ref 22–29)
CREAT SERPL-MCNC: 0.48 MG/DL (ref 0.57–1)
DEPRECATED RDW RBC AUTO: 39.9 FL (ref 37–54)
EGFRCR SERPLBLD CKD-EPI 2021: 124.5 ML/MIN/1.73
EOSINOPHIL # BLD AUTO: 0.01 10*3/MM3 (ref 0–0.4)
EOSINOPHIL NFR BLD AUTO: 0.2 % (ref 0.3–6.2)
ERYTHROCYTE [DISTWIDTH] IN BLOOD BY AUTOMATED COUNT: 12.3 % (ref 12.3–15.4)
GLOBULIN UR ELPH-MCNC: 2.5 GM/DL
GLUCOSE BLDC GLUCOMTR-MCNC: 109 MG/DL (ref 70–130)
GLUCOSE BLDC GLUCOMTR-MCNC: 145 MG/DL (ref 70–130)
GLUCOSE BLDC GLUCOMTR-MCNC: 166 MG/DL (ref 70–130)
GLUCOSE BLDC GLUCOMTR-MCNC: 261 MG/DL (ref 70–130)
GLUCOSE BLDC GLUCOMTR-MCNC: 96 MG/DL (ref 70–130)
GLUCOSE SERPL-MCNC: 258 MG/DL (ref 65–99)
HCT VFR BLD AUTO: 31.5 % (ref 34–46.6)
HGB BLD-MCNC: 10.5 G/DL (ref 12–15.9)
IMM GRANULOCYTES # BLD AUTO: 0.02 10*3/MM3 (ref 0–0.05)
IMM GRANULOCYTES NFR BLD AUTO: 0.3 % (ref 0–0.5)
LYMPHOCYTES # BLD AUTO: 1.3 10*3/MM3 (ref 0.7–3.1)
LYMPHOCYTES NFR BLD AUTO: 22.5 % (ref 19.6–45.3)
MCH RBC QN AUTO: 29.4 PG (ref 26.6–33)
MCHC RBC AUTO-ENTMCNC: 33.3 G/DL (ref 31.5–35.7)
MCV RBC AUTO: 88.2 FL (ref 79–97)
MONOCYTES # BLD AUTO: 0.4 10*3/MM3 (ref 0.1–0.9)
MONOCYTES NFR BLD AUTO: 6.9 % (ref 5–12)
NEUTROPHILS NFR BLD AUTO: 4.04 10*3/MM3 (ref 1.7–7)
NEUTROPHILS NFR BLD AUTO: 69.8 % (ref 42.7–76)
NRBC BLD AUTO-RTO: 0 /100 WBC (ref 0–0.2)
PLATELET # BLD AUTO: 175 10*3/MM3 (ref 140–450)
PMV BLD AUTO: 9.9 FL (ref 6–12)
POTASSIUM SERPL-SCNC: 3.8 MMOL/L (ref 3.5–5.2)
PROT SERPL-MCNC: 5.5 G/DL (ref 6–8.5)
RBC # BLD AUTO: 3.57 10*6/MM3 (ref 3.77–5.28)
SODIUM SERPL-SCNC: 135 MMOL/L (ref 136–145)
WBC NRBC COR # BLD: 5.79 10*3/MM3 (ref 3.4–10.8)

## 2022-04-30 PROCEDURE — 82962 GLUCOSE BLOOD TEST: CPT

## 2022-04-30 PROCEDURE — 99232 SBSQ HOSP IP/OBS MODERATE 35: CPT | Performed by: INTERNAL MEDICINE

## 2022-04-30 PROCEDURE — 63710000001 INSULIN LISPRO (HUMAN) PER 5 UNITS: Performed by: PHYSICIAN ASSISTANT

## 2022-04-30 PROCEDURE — 99024 POSTOP FOLLOW-UP VISIT: CPT | Performed by: SURGERY

## 2022-04-30 PROCEDURE — 85025 COMPLETE CBC W/AUTO DIFF WBC: CPT | Performed by: SURGERY

## 2022-04-30 PROCEDURE — 25010000002 HYDROMORPHONE 1 MG/ML SOLUTION: Performed by: SURGERY

## 2022-04-30 PROCEDURE — 80053 COMPREHEN METABOLIC PANEL: CPT | Performed by: SURGERY

## 2022-04-30 PROCEDURE — 63710000001 INSULIN DETEMIR PER 5 UNITS: Performed by: INTERNAL MEDICINE

## 2022-04-30 PROCEDURE — 63710000001 INSULIN LISPRO (HUMAN) PER 5 UNITS: Performed by: INTERNAL MEDICINE

## 2022-04-30 PROCEDURE — 63710000001 ONDANSETRON PER 8 MG: Performed by: SURGERY

## 2022-04-30 RX ORDER — CYCLOBENZAPRINE HCL 10 MG
5 TABLET ORAL EVERY 8 HOURS SCHEDULED
Status: DISCONTINUED | OUTPATIENT
Start: 2022-04-30 | End: 2022-05-02 | Stop reason: HOSPADM

## 2022-04-30 RX ORDER — INSULIN LISPRO 100 [IU]/ML
5 INJECTION, SOLUTION INTRAVENOUS; SUBCUTANEOUS
Status: DISCONTINUED | OUTPATIENT
Start: 2022-04-30 | End: 2022-05-02 | Stop reason: HOSPADM

## 2022-04-30 RX ADMIN — HYDROMORPHONE HYDROCHLORIDE 2 MG: 2 TABLET ORAL at 03:49

## 2022-04-30 RX ADMIN — HYDROMORPHONE HYDROCHLORIDE 1 MG: 1 INJECTION, SOLUTION INTRAMUSCULAR; INTRAVENOUS; SUBCUTANEOUS at 08:11

## 2022-04-30 RX ADMIN — CYCLOBENZAPRINE 5 MG: 10 TABLET, FILM COATED ORAL at 21:05

## 2022-04-30 RX ADMIN — SODIUM CHLORIDE 125 ML/HR: 9 INJECTION, SOLUTION INTRAVENOUS at 08:22

## 2022-04-30 RX ADMIN — OXYCODONE 5 MG: 5 TABLET ORAL at 10:28

## 2022-04-30 RX ADMIN — ACETAMINOPHEN 1000 MG: 500 TABLET ORAL at 05:52

## 2022-04-30 RX ADMIN — INSULIN LISPRO 3 UNITS: 100 INJECTION, SOLUTION INTRAVENOUS; SUBCUTANEOUS at 11:49

## 2022-04-30 RX ADMIN — INSULIN DETEMIR 10 UNITS: 100 INJECTION, SOLUTION SUBCUTANEOUS at 08:15

## 2022-04-30 RX ADMIN — INSULIN LISPRO 5 UNITS: 100 INJECTION, SOLUTION INTRAVENOUS; SUBCUTANEOUS at 11:49

## 2022-04-30 RX ADMIN — HYDROMORPHONE HYDROCHLORIDE 1 MG: 1 INJECTION, SOLUTION INTRAMUSCULAR; INTRAVENOUS; SUBCUTANEOUS at 14:29

## 2022-04-30 RX ADMIN — CYCLOBENZAPRINE 5 MG: 10 TABLET, FILM COATED ORAL at 16:22

## 2022-04-30 RX ADMIN — INSULIN DETEMIR 10 UNITS: 100 INJECTION, SOLUTION SUBCUTANEOUS at 21:05

## 2022-04-30 RX ADMIN — SIMETHICONE 80 MG: 80 TABLET, CHEWABLE ORAL at 08:12

## 2022-04-30 RX ADMIN — INSULIN LISPRO 8 UNITS: 100 INJECTION, SOLUTION INTRAVENOUS; SUBCUTANEOUS at 08:14

## 2022-04-30 RX ADMIN — INSULIN LISPRO 5 UNITS: 100 INJECTION, SOLUTION INTRAVENOUS; SUBCUTANEOUS at 08:13

## 2022-04-30 RX ADMIN — ONDANSETRON HYDROCHLORIDE 4 MG: 4 TABLET, FILM COATED ORAL at 14:14

## 2022-04-30 RX ADMIN — OXYCODONE 5 MG: 5 TABLET ORAL at 18:18

## 2022-04-30 RX ADMIN — SIMETHICONE 80 MG: 80 TABLET, CHEWABLE ORAL at 14:14

## 2022-04-30 RX ADMIN — PANTOPRAZOLE SODIUM 40 MG: 40 INJECTION, POWDER, FOR SOLUTION INTRAVENOUS at 05:52

## 2022-04-30 RX ADMIN — SODIUM CHLORIDE 125 ML/HR: 9 INJECTION, SOLUTION INTRAVENOUS at 01:04

## 2022-04-30 RX ADMIN — OXYCODONE 5 MG: 5 TABLET ORAL at 02:46

## 2022-04-30 RX ADMIN — SODIUM CHLORIDE 125 ML/HR: 9 INJECTION, SOLUTION INTRAVENOUS at 16:28

## 2022-04-30 RX ADMIN — GABAPENTIN 100 MG: 100 CAPSULE ORAL at 08:12

## 2022-05-01 ENCOUNTER — APPOINTMENT (OUTPATIENT)
Dept: MRI IMAGING | Facility: HOSPITAL | Age: 37
End: 2022-05-01

## 2022-05-01 LAB
ALBUMIN SERPL-MCNC: 3.2 G/DL (ref 3.5–5.2)
ALBUMIN/GLOB SERPL: 1.2 G/DL
ALP SERPL-CCNC: 320 U/L (ref 39–117)
ALT SERPL W P-5'-P-CCNC: 272 U/L (ref 1–33)
ANION GAP SERPL CALCULATED.3IONS-SCNC: 11 MMOL/L (ref 5–15)
AST SERPL-CCNC: 299 U/L (ref 1–32)
BASOPHILS # BLD AUTO: 0.03 10*3/MM3 (ref 0–0.2)
BASOPHILS NFR BLD AUTO: 0.6 % (ref 0–1.5)
BILIRUB SERPL-MCNC: 0.6 MG/DL (ref 0–1.2)
BUN SERPL-MCNC: 7 MG/DL (ref 6–20)
BUN/CREAT SERPL: 16.3 (ref 7–25)
CALCIUM SPEC-SCNC: 8.2 MG/DL (ref 8.6–10.5)
CHLORIDE SERPL-SCNC: 103 MMOL/L (ref 98–107)
CO2 SERPL-SCNC: 23 MMOL/L (ref 22–29)
CREAT SERPL-MCNC: 0.43 MG/DL (ref 0.57–1)
DEPRECATED RDW RBC AUTO: 41 FL (ref 37–54)
EGFRCR SERPLBLD CKD-EPI 2021: 127.9 ML/MIN/1.73
EOSINOPHIL # BLD AUTO: 0.02 10*3/MM3 (ref 0–0.4)
EOSINOPHIL NFR BLD AUTO: 0.4 % (ref 0.3–6.2)
ERYTHROCYTE [DISTWIDTH] IN BLOOD BY AUTOMATED COUNT: 12.2 % (ref 12.3–15.4)
GLOBULIN UR ELPH-MCNC: 2.7 GM/DL
GLUCOSE BLDC GLUCOMTR-MCNC: 149 MG/DL (ref 70–130)
GLUCOSE BLDC GLUCOMTR-MCNC: 161 MG/DL (ref 70–130)
GLUCOSE BLDC GLUCOMTR-MCNC: 209 MG/DL (ref 70–130)
GLUCOSE BLDC GLUCOMTR-MCNC: 231 MG/DL (ref 70–130)
GLUCOSE SERPL-MCNC: 224 MG/DL (ref 65–99)
HCT VFR BLD AUTO: 34.9 % (ref 34–46.6)
HGB BLD-MCNC: 11.3 G/DL (ref 12–15.9)
IMM GRANULOCYTES # BLD AUTO: 0.01 10*3/MM3 (ref 0–0.05)
IMM GRANULOCYTES NFR BLD AUTO: 0.2 % (ref 0–0.5)
LYMPHOCYTES # BLD AUTO: 0.89 10*3/MM3 (ref 0.7–3.1)
LYMPHOCYTES NFR BLD AUTO: 17.3 % (ref 19.6–45.3)
MCH RBC QN AUTO: 29.7 PG (ref 26.6–33)
MCHC RBC AUTO-ENTMCNC: 32.4 G/DL (ref 31.5–35.7)
MCV RBC AUTO: 91.6 FL (ref 79–97)
MONOCYTES # BLD AUTO: 0.42 10*3/MM3 (ref 0.1–0.9)
MONOCYTES NFR BLD AUTO: 8.2 % (ref 5–12)
NEUTROPHILS NFR BLD AUTO: 3.78 10*3/MM3 (ref 1.7–7)
NEUTROPHILS NFR BLD AUTO: 73.3 % (ref 42.7–76)
NRBC BLD AUTO-RTO: 0 /100 WBC (ref 0–0.2)
PLATELET # BLD AUTO: 192 10*3/MM3 (ref 140–450)
PMV BLD AUTO: 9.6 FL (ref 6–12)
POTASSIUM SERPL-SCNC: 3.7 MMOL/L (ref 3.5–5.2)
PROT SERPL-MCNC: 5.9 G/DL (ref 6–8.5)
RBC # BLD AUTO: 3.81 10*6/MM3 (ref 3.77–5.28)
SODIUM SERPL-SCNC: 137 MMOL/L (ref 136–145)
WBC NRBC COR # BLD: 5.15 10*3/MM3 (ref 3.4–10.8)

## 2022-05-01 PROCEDURE — 99024 POSTOP FOLLOW-UP VISIT: CPT | Performed by: SURGERY

## 2022-05-01 PROCEDURE — 85025 COMPLETE CBC W/AUTO DIFF WBC: CPT | Performed by: SURGERY

## 2022-05-01 PROCEDURE — 99223 1ST HOSP IP/OBS HIGH 75: CPT | Performed by: NURSE PRACTITIONER

## 2022-05-01 PROCEDURE — 74181 MRI ABDOMEN W/O CONTRAST: CPT

## 2022-05-01 PROCEDURE — 80053 COMPREHEN METABOLIC PANEL: CPT | Performed by: SURGERY

## 2022-05-01 PROCEDURE — 63710000001 INSULIN LISPRO (HUMAN) PER 5 UNITS: Performed by: PHYSICIAN ASSISTANT

## 2022-05-01 PROCEDURE — 99232 SBSQ HOSP IP/OBS MODERATE 35: CPT | Performed by: HOSPITALIST

## 2022-05-01 PROCEDURE — 82962 GLUCOSE BLOOD TEST: CPT

## 2022-05-01 RX ADMIN — CYCLOBENZAPRINE 5 MG: 10 TABLET, FILM COATED ORAL at 14:29

## 2022-05-01 RX ADMIN — CYCLOBENZAPRINE 5 MG: 10 TABLET, FILM COATED ORAL at 21:30

## 2022-05-01 RX ADMIN — SODIUM CHLORIDE 125 ML/HR: 9 INJECTION, SOLUTION INTRAVENOUS at 09:04

## 2022-05-01 RX ADMIN — INSULIN LISPRO 5 UNITS: 100 INJECTION, SOLUTION INTRAVENOUS; SUBCUTANEOUS at 16:56

## 2022-05-01 RX ADMIN — OXYCODONE 5 MG: 5 TABLET ORAL at 09:11

## 2022-05-01 RX ADMIN — OXYCODONE 5 MG: 5 TABLET ORAL at 17:10

## 2022-05-01 RX ADMIN — OXYCODONE 5 MG: 5 TABLET ORAL at 00:25

## 2022-05-01 RX ADMIN — HYDROMORPHONE HYDROCHLORIDE 2 MG: 2 TABLET ORAL at 12:06

## 2022-05-01 RX ADMIN — CYCLOBENZAPRINE 5 MG: 10 TABLET, FILM COATED ORAL at 06:12

## 2022-05-01 RX ADMIN — INSULIN LISPRO 3 UNITS: 100 INJECTION, SOLUTION INTRAVENOUS; SUBCUTANEOUS at 21:30

## 2022-05-01 RX ADMIN — SODIUM CHLORIDE 125 ML/HR: 9 INJECTION, SOLUTION INTRAVENOUS at 00:25

## 2022-05-01 RX ADMIN — SODIUM CHLORIDE 125 ML/HR: 9 INJECTION, SOLUTION INTRAVENOUS at 16:56

## 2022-05-01 RX ADMIN — INSULIN LISPRO 5 UNITS: 100 INJECTION, SOLUTION INTRAVENOUS; SUBCUTANEOUS at 11:51

## 2022-05-01 RX ADMIN — HYDROMORPHONE HYDROCHLORIDE 2 MG: 2 TABLET ORAL at 03:18

## 2022-05-01 RX ADMIN — PANTOPRAZOLE SODIUM 40 MG: 40 INJECTION, POWDER, FOR SOLUTION INTRAVENOUS at 06:12

## 2022-05-01 NOTE — CONSULTS
[facility] Gastroenterology Consult    Referring Provider: Yunior Finnegan MD    PCP: Yolanda Tian APRN    Reason for Consultation: Elevated LFTs    Chief complaint: Postoperative abdominal pain    History of present illness:    Rain Barreto is a 36 y.o. female who is admitted for postoperative care following laparoscopic cholecystectomy and sleeve gastrectomy on 4/28/2022.  GI consult received on hospital day 3 for elevated LFTs.  Following surgery, patient's LFTs have steadily increased daily with unknown clinical etiology.  Patient does not report any prior history of hepatobiliary diseases or disorders in herself or her family.  Is not sure if she had gallstones at time of cholecystectomy or if it was ever diagnosed on prior imaging modalities.  Does not report any N/V/D, shortness of breath, chest pain, or fever/chills; does report abdominal pain and discomfort secondary to recent surgical interventions.  No current ongoing use of alcohol reported and patient is not a smoker.  No prior history of viral hepatitis. Past medical, surgical, social, and family histories are reviewed for accuracy.  No documented alleviating or exacerbating factors.  Does not endorse pain at time of exam.    Allergies:  Morphine, Hydrocodone, Jardiance [empagliflozin], Latex, and Trulicity [dulaglutide]    Scheduled Meds:  cyclobenzaprine, 5 mg, Oral, Q8H  enoxaparin, 40 mg, Subcutaneous, Daily  insulin detemir, 10 Units, Subcutaneous, Q12H  insulin lispro, 0-14 Units, Subcutaneous, 4x Daily AC & at Bedtime  Insulin Lispro, 5 Units, Subcutaneous, TID With Meals  pantoprazole, 40 mg, Intravenous, Q AM         Infusions:  sodium chloride, 125 mL/hr, Last Rate: 125 mL/hr (05/01/22 7066)        PRN Meds:  •  Albuterol Sulfate NEB Orderable  •  ALPRAZolam  •  dextrose  •  dextrose  •  diphenhydrAMINE  •  ferric gluconate  •  glucagon (human recombinant)  •  hydrALAZINE  •  HYDROmorphone  •  LORazepam  •  LORazepam  •   metoclopramide  •  [DISCONTINUED] HYDROmorphone **AND** naloxone  •  ondansetron **OR** ondansetron  •  oxyCODONE  •  phenol  •  prochlorperazine  •  promethazine  •  simethicone    Home Meds:  Medications Prior to Admission   Medication Sig Dispense Refill Last Dose   • insulin lispro (ADMELOG) 100 UNIT/ML injection Inject  under the skin into the appropriate area as directed 3 (Three) Times a Day Before Meals. Per Omnipod   4/28/2022 at Unknown time   • Continuous Blood Gluc Sensor (Dexcom G6 Sensor) USE TO CHECK BLOOD SUGAR AND CHANGE EVERY 10 DAYS      • ondansetron (ZOFRAN) 4 MG tablet Take 4 mg by mouth Every 8 (Eight) Hours As Needed for Nausea.   More than a month at Unknown time       ROS: Review of Systems   Constitutional: Positive for activity change and appetite change. Negative for chills, diaphoresis, fatigue, fever and unexpected weight change.   HENT: Negative for sore throat, trouble swallowing and voice change.    Eyes: Negative.    Respiratory: Negative for apnea, cough, choking, chest tightness, shortness of breath, wheezing and stridor.    Cardiovascular: Negative for chest pain, palpitations and leg swelling.   Gastrointestinal: Positive for abdominal pain. Negative for abdominal distention, anal bleeding, blood in stool, constipation, diarrhea, nausea, rectal pain and vomiting.   Endocrine: Negative.    Genitourinary: Negative.    Musculoskeletal: Negative.    Skin: Positive for wound (Recent surgical incisions).   Allergic/Immunologic: Negative.    Neurological: Negative.    Hematological: Negative for adenopathy. Does not bruise/bleed easily.   Psychiatric/Behavioral: Negative.    All other systems reviewed and are negative.      PAST MED HX:  Past Medical History:   Diagnosis Date   • Anxiety    • Depression    • Diabetes mellitus (HCC)     Type I, has detachable pump. dx in 20's   • Diverticulosis    • Dyspepsia    • Gallstones    • Heartburn     takes prn zantac and tums, EGD 2019 [facility]  "dilatation, EGD Dr. Finnegan 19   • HLD (hyperlipidemia)    • Hypertension     denies   • Kidney infection    • Kidney stone    • Migraine    • MRSA carrier     PAT (+) 22   •  (normal spontaneous vaginal delivery)     1st child w/ preeclampsia   • PONV (postoperative nausea and vomiting)    • Tattoos    • Wears dentures     upper   • Wheezing     \"seasonal allergies\" uses inhalers prn       PAST SURG HX:  Past Surgical History:   Procedure Laterality Date   •  SECTION     •  SECTION     •  SECTION     • CHOLECYSTECTOMY N/A 2022    Procedure: CHOLECYSTECTOMY LAPAROSCOPIC;  Surgeon: Yunior Finnegan MD;  Location:  [facility];  Service: General;  Laterality: N/A;   • COLONOSCOPY N/A 04/15/2019    Procedure: COLONOSCOPY W/ COLD FORCEP BIOPSIES; COLD SNARE POLYPECTOMY; COLD FORCEP POLYPECTOMY; RESOLUTION CLIP X1;  Surgeon: Adán Pena MD;  Location: [facility] ENDOSCOPY;  Service: Gastroenterology   • D & C HYSTEROSCOPY ENDOMETRIAL ABLATION     • ENDOSCOPY N/A 2019    Procedure: ESOPHAGOGASTRODUODENOSCOPY with cold biopsies and dilation;  Surgeon: Adán Pena MD;  Location:  [facility];  Service: Gastroenterology   • ENDOSCOPY N/A 2022    Procedure: ESOPHAGOGASTRODUODENOSCOPY;  Surgeon: Yunior Finnegan MD;  Location:  [facility];  Service: Bariatric;  Laterality: N/A;   • GASTRIC SLEEVE LAPAROSCOPIC N/A 2022    Procedure: GASTRIC SLEEVE LAPAROSCOPIC;  Surgeon: Yunior Finnegan MD;  Location:  [facility];  Service: Bariatric;  Laterality: N/A;   • TEETH EXTRACTION      dentures on upper plate   • TOTAL ABDOMINAL HYSTERECTOMY  2019    low transverse.  [facility] - lap converted to open for scar tissue   • TUBAL ABDOMINAL LIGATION         FAM HX:  Family History   Problem Relation Age of Onset   • Colon cancer Paternal Uncle    • Colon cancer Maternal Grandmother    • Crohn's disease Maternal Grandmother    • Diabetes Maternal " "Grandmother    • Colon cancer Paternal Grandmother    • Other Paternal Grandmother         Colitis   • Diabetes Paternal Grandmother    • Hyperlipidemia Paternal Grandmother    • Hypertension Paternal Grandmother    • Sleep apnea Paternal Grandmother    • Colon cancer Paternal Grandfather    • Diabetes Paternal Grandfather    • Hypertension Paternal Grandfather    • Hypertension Mother    • Cancer Mother    • Diabetes Maternal Grandfather    • Hypertension Maternal Grandfather    • Stroke Maternal Grandfather    • No Known Problems Brother    • No Known Problems Son    • No Known Problems Son    • No Known Problems Daughter    • No Known Problems Daughter        SOC HX:  Social History     Socioeconomic History   • Marital status:    Tobacco Use   • Smoking status: Former Smoker     Packs/day: 0.25     Years: 9.00     Pack years: 2.25     Types: Cigarettes     Quit date:      Years since quittin.3   • Smokeless tobacco: Never Used   Vaping Use   • Vaping Use: Never used   Substance and Sexual Activity   • Alcohol use: Not Currently     Comment: social   • Drug use: No   • Sexual activity: Defer       PHYSICAL EXAM  /85 (BP Location: Right arm, Patient Position: Lying)   Pulse 96   Temp 99.4 °F (37.4 °C) (Oral)   Resp 16   Ht 154.9 cm (61\")   Wt 95.7 kg (211 lb)   LMP  (LMP Unknown)   SpO2 91%   BMI 39.87 kg/m²   Wt Readings from Last 3 Encounters:   22 95.7 kg (211 lb)   22 87.3 kg (192 lb 8 oz)   22 87.3 kg (192 lb 8 oz)   ,body mass index is 39.87 kg/m².  Physical Exam  Vitals and nursing note reviewed.   Constitutional:       General: She is not in acute distress.     Appearance: Normal appearance. She is normal weight. She is not ill-appearing or toxic-appearing.   HENT:      Head: Normocephalic and atraumatic.   Eyes:      General: No scleral icterus.     Extraocular Movements: Extraocular movements intact.      Conjunctiva/sclera: Conjunctivae normal.      " Pupils: Pupils are equal, round, and reactive to light.   Cardiovascular:      Rate and Rhythm: Normal rate and regular rhythm.      Pulses: Normal pulses.      Heart sounds: Normal heart sounds.   Pulmonary:      Effort: Pulmonary effort is normal. No respiratory distress.      Breath sounds: Normal breath sounds.   Abdominal:      General: Abdomen is flat. Bowel sounds are normal. There is distension.      Palpations: Abdomen is soft. There is no mass.      Tenderness: There is abdominal tenderness. There is no guarding or rebound.      Hernia: No hernia is present.      Comments: Laparoscopic surgical incisions appreciated on abdomen; incisions appear well approximated without any redness, streaking, or discharge.   Genitourinary:     Comments: Defer  Musculoskeletal:         General: Normal range of motion.      Cervical back: Normal range of motion and neck supple. No rigidity or tenderness.      Right lower leg: No edema.      Left lower leg: No edema.   Skin:     General: Skin is warm and dry.      Capillary Refill: Capillary refill takes less than 2 seconds.      Coloration: Skin is not jaundiced or pale.   Neurological:      General: No focal deficit present.      Mental Status: She is alert and oriented to person, place, and time.   Psychiatric:         Mood and Affect: Mood normal.         Behavior: Behavior normal.         Thought Content: Thought content normal.         Judgment: Judgment normal.         Results Review:   I reviewed the patient's new clinical results.  I reviewed the patient's new imaging results and agree with the interpretation.  I personally viewed and interpreted the patient's EKG/Telemetry data    Lab Results   Component Value Date    WBC 5.15 05/01/2022    HGB 11.3 (L) 05/01/2022    HGB 10.5 (L) 04/30/2022    HGB 12.0 04/29/2022    HCT 34.9 05/01/2022    MCV 91.6 05/01/2022     05/01/2022       No results found for: INR    Lab Results   Component Value Date    GLUCOSE 224  (H) 05/01/2022    BUN 7 05/01/2022    CREATININE 0.43 (L) 05/01/2022    EGFRIFNONA 104 02/21/2022    EGFRIFAFRI >60 07/30/2021    BCR 16.3 05/01/2022     05/01/2022    K 3.7 05/01/2022    CO2 23.0 05/01/2022    CALCIUM 8.2 (L) 05/01/2022    PROTENTOTREF 7.7 06/29/2021    ALBUMIN 3.20 (L) 05/01/2022    ALKPHOS 320 (H) 05/01/2022    BILITOT 0.6 05/01/2022     (H) 05/01/2022     (H) 05/01/2022       Peripheral Block    Result Date: 4/28/2022  Maria Esther Rodriguez CRNA     4/28/2022  1:59 PM Peripheral Block Patient reassessed immediately prior to procedure Patient location during procedure: OR Reason for block: at surgeon's request and post-op pain management Performed by Anesthesiologist: Kvng Ramos MD CRNA/CAA: Sonali Mata CRNA Preanesthetic Checklist Completed: patient identified, IV checked, site marked, risks and benefits discussed, surgical consent, monitors and equipment checked, pre-op evaluation and timeout performed Prep: Pt Position: supine Sterile barriers:cap, gloves, sterile barriers and mask Prep: ChloraPrep Patient monitoring: blood pressure monitoring, continuous pulse oximetry and EKG Procedure Sedation: yes Performed under: general Guidance:ultrasound guided Images:still images obtained, printed/placed on chart Laterality:Bilateral Block Type:TAP Injection Technique:single-shot Needle Type:short-bevel and echogenic Needle Gauge:20 G Resistance on Injection: none Medications Used: bupivacaine PF (MARCAINE) 0.25 % injection, 60 mL dexamethasone sodium phosphate injection, 4 mg Med administered at 4/28/2022 1:38 PM Medications Comment:Block Injection:  LA dose divided between Right and Left block Post Assessment Injection Assessment: negative aspiration for heme, incremental injection and no paresthesia on injection Patient Tolerance:comfortable throughout block Complications:no Additional Notes   Under Ultrasound guidance, a BBraun 4inch 360 degree needle was advanced  with Normal Saline hydro dissection of tissue.  The Internal Oblique and Transversus Abdominus muscles where visualized.  At or before the aponeurosis of Internal Oblique, local anesthetic spread was visualized in the Transversus Abdominus Plane. Injection was made incrementally with aspiration every 5 mls.  There was no  intravascular injection,  injection pressure was normal, there was no neural injection, and the procedure was completed without difficulty.  Thank You.     FL Upper GI Single Contrast With KUB    Result Date: 4/30/2022  EXAMINATION: FL UPPER GI SINGLE CONTRAST W KUB-  INDICATION: sleeve water soluble; K81.9-Cholecystitis, unspecified; E66.01-Morbid (severe) obesity due to excess calories  TECHNIQUE: 18 seconds of fluoroscopic time was used for this exam. 7 associated fluoroscopic series were saved.  imaging reveals a nonobstructive bowel gas pattern. There are surgical clips visible in the gallbladder fossa. A suture line is visible in the left upper quadrant.  COMPARISON: NONE  FINDINGS: Under fluoroscopic observation, the patient ingested water-soluble contrast. The oral phase of deglutition appeared normal. The esophageal mucosa appeared grossly normal. There was no evidence of a focal esophageal stricture. Examination of the stomach demonstrated postoperative changes that are consistent with a vertical sleeve gastrectomy. No extravasation of contrast was seen. The gastric folds and gastric mucosa appeared grossly normal. There was no evidence of a gastric or duodenal ulcer. There was no delay in gastric emptying. The duodenal bulb and duodenal C-loop appeared grossly normal.       Status post vertical sleeve gastrectomy. There was no evidence of extraluminal contrast. There was no delay in gastric emptying.    This report was finalized on 4/30/2022 2:46 PM by Dr. James Hendrix MD.        COVID19   Date Value Ref Range Status   04/26/2022 Not Detected Not Detected - Ref. Range Final        ASSESSMENTS/PLANS  1.  Elevated LFTs, cholestatic fashion  2.  Status post laparoscopic sleeve gastrectomy and laparoscopic cholecystectomy, POD 3  3.  Type 1 diabetes mellitus  4.  Obesity, BMI 39.87  5.  Anxiety and depression.    Rain Barreto is a 36 y.o. female admitted to hospital for postoperative care following a laparoscopic sleeve gastrectomy and laparoscopic cholecystectomy for bariatric surgery 3 days ago.  GI consult was received for patient's persistently elevated LFTs that began after surgery.  At present, unsure on etiology of patient's elevated LFTs; recommend MRCP to rule out retained CBD stone.  Patient's medications reviewed and are unlikely to contribute to elevated LFTs.     >>> Recommend MRCP today   -Plan to be n.p.o. for 4 hours prior to study.   -May have premedication for anxiety  >>> Trend LFTs overnight  >>> Continue antiemetics and pain control measures.    I discussed the patient's findings and my recommendations with patient, family and nursing staff.    Ray Dominguez, SATHISH  05/01/22  16:57 EDT

## 2022-05-01 NOTE — PROGRESS NOTES
"Bariatric Surgery     LOS: 3 days   Patient Care Team:  Yolanda Tian APRN as PCP - General (Nurse Practitioner)    Chief Complaint:  POD #3    Subjective     Interval History:    Feels better today.  The Flexeril helped her pain and has not required IV narcotics.  Used a heating pad to hematoma and it has softened considerably.  -231.    IS 1500.  Ambulating.  Drinking well.      LFTs have increased consistently since surgery.     Objective     Vital Signs  Blood pressure 140/85, pulse 96, temperature 99.4 °F (37.4 °C), temperature source Oral, resp. rate 16, height 154.9 cm (61\"), weight 95.7 kg (211 lb), SpO2 91 %, not currently breastfeeding.    Physical Exam:  General: Alert, NAD  Abdomen: soft, appropriate, swelling of port site hematoma has decreased significantly since yesterday  Extremities: warm, (+) SCDs     Results Review:     I reviewed the patient's new clinical results.    Labs:  Lab Results (last 24 hours)     Procedure Component Value Units Date/Time    Comprehensive Metabolic Panel [[id number]]  (Abnormal) Collected: 05/01/22 1246    Specimen: Blood Updated: 05/01/22 1325     Glucose 224 mg/dL      BUN 7 mg/dL      Creatinine 0.43 mg/dL      Sodium 137 mmol/L      Potassium 3.7 mmol/L      Comment: Slight hemolysis detected by analyzer. Results may be affected.        Chloride 103 mmol/L      CO2 23.0 mmol/L      Calcium 8.2 mg/dL      Total Protein 5.9 g/dL      Albumin 3.20 g/dL      ALT (SGPT) 272 U/L      AST (SGOT) 299 U/L      Alkaline Phosphatase 320 U/L      Total Bilirubin 0.6 mg/dL      Globulin 2.7 gm/dL      Comment: Calculated Result        A/G Ratio 1.2 g/dL      BUN/Creatinine Ratio 16.3     Anion Gap 11.0 mmol/L      eGFR 127.9 mL/min/1.73      Comment: National Kidney Foundation and American Society of Nephrology (ASN) Task Force recommended calculation based on the Chronic Kidney Disease Epidemiology Collaboration (CKD-EPI) equation refit without adjustment for race.    "    Narrative:      GFR Normal >60  Chronic Kidney Disease <60  Kidney Failure <15      CBC & Differential [[id number]]  (Abnormal) Collected: 05/01/22 1246    Specimen: Blood Updated: 05/01/22 1318    Narrative:      The following orders were created for panel order CBC & Differential.  Procedure                               Abnormality         Status                     ---------                               -----------         ------                     CBC Auto Differential[[id number]]        Abnormal            Final result               Scan Slide[[id number]]                                                                    Please view results for these tests on the individual orders.    CBC Auto Differential [[id number]]  (Abnormal) Collected: 05/01/22 1246    Specimen: Blood Updated: 05/01/22 1318     WBC 5.15 10*3/mm3      RBC 3.81 10*6/mm3      Hemoglobin 11.3 g/dL      Hematocrit 34.9 %      MCV 91.6 fL      MCH 29.7 pg      MCHC 32.4 g/dL      RDW 12.2 %      RDW-SD 41.0 fl      MPV 9.6 fL      Platelets 192 10*3/mm3      Neutrophil % 73.3 %      Lymphocyte % 17.3 %      Monocyte % 8.2 %      Eosinophil % 0.4 %      Basophil % 0.6 %      Immature Grans % 0.2 %      Neutrophils, Absolute 3.78 10*3/mm3      Lymphocytes, Absolute 0.89 10*3/mm3      Monocytes, Absolute 0.42 10*3/mm3      Eosinophils, Absolute 0.02 10*3/mm3      Basophils, Absolute 0.03 10*3/mm3      Immature Grans, Absolute 0.01 10*3/mm3      nRBC 0.0 /100 WBC     POC Glucose Once [[id number]]  (Abnormal) Collected: 05/01/22 1122    Specimen: Blood Updated: 05/01/22 1125     Glucose 231 mg/dL      Comment: Meter: [id number] : [id number] [employer]       POC Glucose Once [[id number]]  (Abnormal) Collected: 05/01/22 0707    Specimen: Blood Updated: 05/01/22 0709     Glucose 149 mg/dL      Comment: Meter: [id number] : [id number] Lima Namita       POC Glucose Once [[id number]]  (Abnormal) Collected: 04/30/22 2032    Specimen: Blood  Updated: 04/30/22 2034     Glucose 145 mg/dL      Comment: Meter: [id number] : [id number] Mandy Alcantara       POC Glucose Once [[id number]]  (Normal) Collected: 04/30/22 1614    Specimen: Blood Updated: 04/30/22 1616     Glucose 109 mg/dL      Comment: Meter: [id number] : [id number] Arvind Couch             Imaging:  Imaging Results (Last 24 Hours)     ** No results found for the last 24 hours. **            Assessment/Plan     POD # 3 s/p LSG/ella.    Hgb increased to 11.3.    LFTs have increased since surgery and now AST, ALT, and alk phos approach 300.  I would have expected increased AST, ALT, and alk phos after liver retraction and cholecystectomy, but I think they should be decreasing at this point.  Will consult GI for their opinion on etiology of elevated LFTs.    D/c IV pain meds.  Continue flexeril and oxycodone.      BG still sometimes >200, but less lows.  Appreciate hospitalist consult: will need endocrinology referral to learn proper use of insulin pump.  Continue long-acting insulin and ISS.    Continue OOB/ PT/ DVT prophx/PPI.          Keesha Adams MD  05/01/22  15:49 EDT

## 2022-05-01 NOTE — PLAN OF CARE
Goal Outcome Evaluation:               VSS on 2L O2 with sleep. Pain controlled with PO PRN meds this shift. Will continue plan of care. 0255  5/1/2022

## 2022-05-01 NOTE — PROGRESS NOTES
[facility] Medicine Services  PROGRESS NOTE    Patient Name: Rain Barreto  : 1985  MRN: [id number]    Date of Admission: 2022  Primary Care Physician: Yolanda Tian APRN    Subjective   Subjective     CC:  F/u diabetes management, post-op sleeve    HPI:  In bed. Tolerating liquids. No BM. No dysuria. No f/c. No dyspnea. Some abdominal soreness    Review of Systems   Constitutional: Positive for activity change and fatigue.   HENT: Negative.    Respiratory: Negative.    Cardiovascular: Negative.    Gastrointestinal: Positive for abdominal pain.   Genitourinary: Negative.          Objective   Objective     Vital Signs:   Temp:  [97.9 °F (36.6 °C)-99.5 °F (37.5 °C)] 98.7 °F (37.1 °C)  Heart Rate:  [] 88  Resp:  [16-18] 18  BP: (106-152)/(58-93) 152/93  Flow (L/min):  [2] 2     Physical Exam:  NAD, alert and oriented  OP clear, MMM  Neck supple  No lAD  RRR  CTAB  +BS, soft, TTP diffusely  No c/c/e  No rashes  SAL  Normal affect    Results Reviewed:  LAB RESULTS:      Lab 22  0705 22  1233 22  0633 22  1258   WBC 5.79  --  10.38 6.23   HEMOGLOBIN 10.5* 12.0 12.8 13.9   HEMATOCRIT 31.5* 35.9 37.8 40.0   PLATELETS 175  --  238 247   NEUTROS ABS 4.04  --  9.49*  --    IMMATURE GRANS (ABS) 0.02  --  0.04  --    LYMPHS ABS 1.30  --  0.53*  --    MONOS ABS 0.40  --  0.31  --    EOS ABS 0.01  --  0.00  --    MCV 88.2  --  87.3 85.1         Lab 22  0705 22  0633 22  1258   SODIUM 135* 133*  --    POTASSIUM 3.8 4.4  --    CHLORIDE 102 99  --    CO2 25.0 16.0*  --    ANION GAP 8.0 18.0*  --    BUN 10 10  --    CREATININE 0.48* 0.59  --    EGFR 124.5 118.5  --    GLUCOSE 258* 342*  --    CALCIUM 8.2* 8.8  --    HEMOGLOBIN A1C  --   --  11.60*         Lab 22  0705 22  0633   TOTAL PROTEIN 5.5* 6.3   ALBUMIN 3.00* 3.50   GLOBULIN 2.5 2.8   ALT (SGPT) 141* 99*   AST (SGOT) 144* 89*   BILIRUBIN 0.8 0.6   ALK PHOS 114 88                  Lab 04/29/22  0633   IRON 68         Brief Urine Lab Results  (Last result in the past 365 days)      Color   Clarity   Blood   Leuk Est   Nitrite   Protein   CREAT   Urine HCG        04/29/22 1826 Yellow   Clear   Negative   Negative   Negative   Negative                 Microbiology Results Abnormal     None          FL Upper GI Single Contrast With KUB    Result Date: 4/30/2022  EXAMINATION: FL UPPER GI SINGLE CONTRAST W KUB-  INDICATION: sleeve water soluble; K81.9-Cholecystitis, unspecified; E66.01-Morbid (severe) obesity due to excess calories  TECHNIQUE: 18 seconds of fluoroscopic time was used for this exam. 7 associated fluoroscopic series were saved.  imaging reveals a nonobstructive bowel gas pattern. There are surgical clips visible in the gallbladder fossa. A suture line is visible in the left upper quadrant.  COMPARISON: NONE  FINDINGS: Under fluoroscopic observation, the patient ingested water-soluble contrast. The oral phase of deglutition appeared normal. The esophageal mucosa appeared grossly normal. There was no evidence of a focal esophageal stricture. Examination of the stomach demonstrated postoperative changes that are consistent with a vertical sleeve gastrectomy. No extravasation of contrast was seen. The gastric folds and gastric mucosa appeared grossly normal. There was no evidence of a gastric or duodenal ulcer. There was no delay in gastric emptying. The duodenal bulb and duodenal C-loop appeared grossly normal.       Impression: Status post vertical sleeve gastrectomy. There was no evidence of extraluminal contrast. There was no delay in gastric emptying.    This report was finalized on 4/30/2022 2:46 PM by Dr. James Hendrix MD.        Results for orders placed during the hospital encounter of 09/21/21    Adult Transthoracic Echo Complete W/ Cont if Necessary Per Protocol    Interpretation Summary  · Normal left ventricular cavity size and wall thickness  · Left ventricular  ejection fraction appears to be 61 - 65%.  · Left ventricular diastolic function was normal.  · No aortic valve regurgitation or stenosis is present.  · Trace to mild mitral valve regurgitation is present.  · Physiologic tricuspid valve regurgitation is present.    Recommendation.    Cleared to undergo surgery with usual precaution.      I have reviewed the medications:  Scheduled Meds:cyclobenzaprine, 5 mg, Oral, Q8H  enoxaparin, 40 mg, Subcutaneous, Daily  insulin detemir, 10 Units, Subcutaneous, Q12H  insulin lispro, 0-14 Units, Subcutaneous, 4x Daily AC & at Bedtime  Insulin Lispro, 5 Units, Subcutaneous, TID With Meals  pantoprazole, 40 mg, Intravenous, Q AM      Continuous Infusions:lactated ringers, 150 mL/hr, Last Rate: 150 mL/hr (04/29/22 0656)  sodium chloride, 125 mL/hr, Last Rate: 125 mL/hr (05/01/22 0025)      PRN Meds:.•  Albuterol Sulfate NEB Orderable  •  ALPRAZolam  •  dextrose  •  dextrose  •  diphenhydrAMINE  •  ferric gluconate  •  glucagon (human recombinant)  •  hydrALAZINE  •  HYDROmorphone **AND** naloxone  •  HYDROmorphone  •  LORazepam  •  LORazepam  •  metoclopramide  •  ondansetron **OR** ondansetron  •  oxyCODONE  •  phenol  •  prochlorperazine  •  promethazine  •  simethicone    Assessment/Plan   Assessment & Plan     Active Hospital Problems    Diagnosis  POA   • Morbid exogenous obesity (HCC) [E66.01]  Unknown   • Diabetes mellitus (HCC) [E11.9]  Yes      Resolved Hospital Problems    Diagnosis Date Resolved POA   • Cholecystitis without cholelithiasis [K81.9] 04/28/2022 Unknown        Brief Hospital Course to date:  Rain Barreto is a 36 y.o. female with Type 1 Diabetes Mellitus, morbid obesity, anxiety, depression, HLD presenting for active laparoscopic sleeve gastrectomy and and cholecystectomy.  Has been using insulin pump with several episodes of hypoglycemia.     Type I Diabetes  -hypoglycemia with insulin pump, and unclear how to adjust basal rate  -home on basal/bolus/SSI  at discharge, with referral to endocrinology at discharge for insulin pump management    Post-op anemia  -thought secondary to port site bleeding  -repeat labs pending    Morbid Obesity s/p Gastric Sleeve  -management per Dr. Finnegan    Anxiety/Depression  - continue home meds     DVT prophylaxis:  Medical and mechanical DVT prophylaxis orders are present.       AM-PAC 6 Clicks Score (PT): 23 (04/30/22 0811)    Disposition: I expect the patient to be discharged 1-2 days, needs better blood sugar control and still having some significant issues with pain.    CODE STATUS:   Code Status and Medical Interventions:   Ordered at: 04/28/22 1332     Level Of Support Discussed With:    Patient     Code Status (Patient has no pulse and is not breathing):    CPR (Attempt to Resuscitate)     Medical Interventions (Patient has pulse or is breathing):    Full Support       James Munoz MD  05/01/22

## 2022-05-01 NOTE — PLAN OF CARE
Problem: Adult Inpatient Plan of Care  Goal: Plan of Care Review  Outcome: Ongoing, Progressing  Flowsheets (Taken 5/1/2022 1419)  Progress: improving  Plan of Care Reviewed With: patient  Outcome Evaluation: VSS, on 2l NC while asleep, room air while awake, IS/flutter and ambulation encouraged, patient has ambulated in hallway today, pain controlled with PO pain medicaiton, hopefully home later today, will continue with plan of care   Goal Outcome Evaluation:  Plan of Care Reviewed With: patient        Progress: improving  Outcome Evaluation: VSS, on 2l NC while asleep, room air while awake, IS/flutter and ambulation encouraged, patient has ambulated in hallway today, pain controlled with PO pain medicaiton, hopefully home later today, will continue with plan of care

## 2022-05-02 ENCOUNTER — READMISSION MANAGEMENT (OUTPATIENT)
Dept: CALL CENTER | Facility: HOSPITAL | Age: 37
End: 2022-05-02

## 2022-05-02 VITALS
TEMPERATURE: 98.7 F | HEIGHT: 61 IN | OXYGEN SATURATION: 91 % | BODY MASS INDEX: 39.84 KG/M2 | DIASTOLIC BLOOD PRESSURE: 96 MMHG | WEIGHT: 211 LBS | RESPIRATION RATE: 16 BRPM | HEART RATE: 99 BPM | SYSTOLIC BLOOD PRESSURE: 159 MMHG

## 2022-05-02 LAB
ALBUMIN SERPL-MCNC: 3.1 G/DL (ref 3.5–5.2)
ALBUMIN/GLOB SERPL: 1.2 G/DL
ALP SERPL-CCNC: 262 U/L (ref 39–117)
ALT SERPL W P-5'-P-CCNC: 175 U/L (ref 1–33)
ANION GAP SERPL CALCULATED.3IONS-SCNC: 13 MMOL/L (ref 5–15)
AST SERPL-CCNC: 76 U/L (ref 1–32)
BASOPHILS # BLD AUTO: 0.02 10*3/MM3 (ref 0–0.2)
BASOPHILS NFR BLD AUTO: 0.4 % (ref 0–1.5)
BILIRUB SERPL-MCNC: 0.8 MG/DL (ref 0–1.2)
BUN SERPL-MCNC: 5 MG/DL (ref 6–20)
BUN/CREAT SERPL: 11.1 (ref 7–25)
CALCIUM SPEC-SCNC: 8.3 MG/DL (ref 8.6–10.5)
CHLORIDE SERPL-SCNC: 101 MMOL/L (ref 98–107)
CO2 SERPL-SCNC: 22 MMOL/L (ref 22–29)
CREAT SERPL-MCNC: 0.45 MG/DL (ref 0.57–1)
CYTO UR: NORMAL
DEPRECATED RDW RBC AUTO: 38.2 FL (ref 37–54)
EGFRCR SERPLBLD CKD-EPI 2021: 126.5 ML/MIN/1.73
EOSINOPHIL # BLD AUTO: 0.06 10*3/MM3 (ref 0–0.4)
EOSINOPHIL NFR BLD AUTO: 1.3 % (ref 0.3–6.2)
ERYTHROCYTE [DISTWIDTH] IN BLOOD BY AUTOMATED COUNT: 11.8 % (ref 12.3–15.4)
GLOBULIN UR ELPH-MCNC: 2.6 GM/DL
GLUCOSE BLDC GLUCOMTR-MCNC: 134 MG/DL (ref 70–130)
GLUCOSE BLDC GLUCOMTR-MCNC: 266 MG/DL (ref 70–130)
GLUCOSE SERPL-MCNC: 238 MG/DL (ref 65–99)
HAV IGM SERPL QL IA: NORMAL
HBV CORE IGM SERPL QL IA: NORMAL
HBV SURFACE AG SERPL QL IA: NORMAL
HCT VFR BLD AUTO: 32.9 % (ref 34–46.6)
HCV AB SER DONR QL: NORMAL
HGB BLD-MCNC: 11.2 G/DL (ref 12–15.9)
IMM GRANULOCYTES # BLD AUTO: 0.01 10*3/MM3 (ref 0–0.05)
IMM GRANULOCYTES NFR BLD AUTO: 0.2 % (ref 0–0.5)
LAB AP CASE REPORT: NORMAL
LAB AP CLINICAL INFORMATION: NORMAL
LYMPHOCYTES # BLD AUTO: 1.19 10*3/MM3 (ref 0.7–3.1)
LYMPHOCYTES NFR BLD AUTO: 25.3 % (ref 19.6–45.3)
MCH RBC QN AUTO: 30.4 PG (ref 26.6–33)
MCHC RBC AUTO-ENTMCNC: 34 G/DL (ref 31.5–35.7)
MCV RBC AUTO: 89.4 FL (ref 79–97)
MONOCYTES # BLD AUTO: 0.36 10*3/MM3 (ref 0.1–0.9)
MONOCYTES NFR BLD AUTO: 7.6 % (ref 5–12)
NEUTROPHILS NFR BLD AUTO: 3.07 10*3/MM3 (ref 1.7–7)
NEUTROPHILS NFR BLD AUTO: 65.2 % (ref 42.7–76)
NRBC BLD AUTO-RTO: 0 /100 WBC (ref 0–0.2)
PATH REPORT.FINAL DX SPEC: NORMAL
PATH REPORT.GROSS SPEC: NORMAL
PLATELET # BLD AUTO: 178 10*3/MM3 (ref 140–450)
PMV BLD AUTO: 10.1 FL (ref 6–12)
POTASSIUM SERPL-SCNC: 3.7 MMOL/L (ref 3.5–5.2)
PROT SERPL-MCNC: 5.7 G/DL (ref 6–8.5)
RBC # BLD AUTO: 3.68 10*6/MM3 (ref 3.77–5.28)
SODIUM SERPL-SCNC: 136 MMOL/L (ref 136–145)
WBC NRBC COR # BLD: 4.71 10*3/MM3 (ref 3.4–10.8)

## 2022-05-02 PROCEDURE — 80053 COMPREHEN METABOLIC PANEL: CPT | Performed by: SURGERY

## 2022-05-02 PROCEDURE — 80074 ACUTE HEPATITIS PANEL: CPT | Performed by: PHYSICIAN ASSISTANT

## 2022-05-02 PROCEDURE — 63710000001 INSULIN LISPRO (HUMAN) PER 5 UNITS: Performed by: PHYSICIAN ASSISTANT

## 2022-05-02 PROCEDURE — 85025 COMPLETE CBC W/AUTO DIFF WBC: CPT | Performed by: SURGERY

## 2022-05-02 PROCEDURE — 99024 POSTOP FOLLOW-UP VISIT: CPT | Performed by: SURGERY

## 2022-05-02 PROCEDURE — 99231 SBSQ HOSP IP/OBS SF/LOW 25: CPT | Performed by: HOSPITALIST

## 2022-05-02 PROCEDURE — 25010000002 ENOXAPARIN PER 10 MG: Performed by: SURGERY

## 2022-05-02 PROCEDURE — 82962 GLUCOSE BLOOD TEST: CPT

## 2022-05-02 PROCEDURE — 63710000001 INSULIN LISPRO (HUMAN) PER 5 UNITS: Performed by: INTERNAL MEDICINE

## 2022-05-02 PROCEDURE — 63710000001 INSULIN DETEMIR PER 5 UNITS: Performed by: INTERNAL MEDICINE

## 2022-05-02 RX ORDER — PROMETHAZINE HYDROCHLORIDE 12.5 MG/1
12.5 TABLET ORAL EVERY 4 HOURS PRN
Qty: 12 TABLET | Refills: 0 | Status: SHIPPED | OUTPATIENT
Start: 2022-05-02 | End: 2022-05-12 | Stop reason: HOSPADM

## 2022-05-02 RX ORDER — INSULIN LISPRO 100 [IU]/ML
4 INJECTION, SOLUTION INTRAVENOUS; SUBCUTANEOUS
Qty: 15 ML | Refills: 12 | Status: ON HOLD | OUTPATIENT
Start: 2022-05-02 | End: 2022-05-12 | Stop reason: SDUPTHER

## 2022-05-02 RX ORDER — INSULIN LISPRO 100 [IU]/ML
0-12 INJECTION, SOLUTION INTRAVENOUS; SUBCUTANEOUS
Refills: 12
Start: 2022-05-02 | End: 2022-05-09

## 2022-05-02 RX ORDER — OXYCODONE HYDROCHLORIDE 5 MG/1
5 TABLET ORAL EVERY 4 HOURS PRN
Qty: 10 TABLET | Refills: 0 | Status: SHIPPED | OUTPATIENT
Start: 2022-05-02 | End: 2022-06-02

## 2022-05-02 RX ORDER — ONDANSETRON 4 MG/1
4 TABLET, FILM COATED ORAL EVERY 4 HOURS PRN
Qty: 20 TABLET | Refills: 0 | Status: SHIPPED | OUTPATIENT
Start: 2022-05-02 | End: 2022-11-14

## 2022-05-02 RX ORDER — OMEPRAZOLE 40 MG/1
40 CAPSULE, DELAYED RELEASE ORAL DAILY
Qty: 60 CAPSULE | Refills: 0 | Status: SHIPPED | OUTPATIENT
Start: 2022-05-02 | End: 2022-07-11

## 2022-05-02 RX ORDER — INSULIN LISPRO 100 [IU]/ML
0-14 INJECTION, SOLUTION INTRAVENOUS; SUBCUTANEOUS
Refills: 12
Start: 2022-05-02 | End: 2022-05-02 | Stop reason: SDUPTHER

## 2022-05-02 RX ORDER — CYCLOBENZAPRINE HCL 10 MG
10 TABLET ORAL 3 TIMES DAILY PRN
Qty: 21 TABLET | Refills: 0 | Status: SHIPPED | OUTPATIENT
Start: 2022-05-02 | End: 2022-11-14

## 2022-05-02 RX ADMIN — INSULIN LISPRO 8 UNITS: 100 INJECTION, SOLUTION INTRAVENOUS; SUBCUTANEOUS at 08:14

## 2022-05-02 RX ADMIN — OXYCODONE 5 MG: 5 TABLET ORAL at 00:50

## 2022-05-02 RX ADMIN — HYDROMORPHONE HYDROCHLORIDE 2 MG: 2 TABLET ORAL at 11:22

## 2022-05-02 RX ADMIN — INSULIN DETEMIR 10 UNITS: 100 INJECTION, SOLUTION SUBCUTANEOUS at 08:16

## 2022-05-02 RX ADMIN — PANTOPRAZOLE SODIUM 40 MG: 40 INJECTION, POWDER, FOR SOLUTION INTRAVENOUS at 06:14

## 2022-05-02 RX ADMIN — SODIUM CHLORIDE 125 ML/HR: 9 INJECTION, SOLUTION INTRAVENOUS at 01:25

## 2022-05-02 RX ADMIN — CYCLOBENZAPRINE 5 MG: 10 TABLET, FILM COATED ORAL at 13:06

## 2022-05-02 RX ADMIN — INSULIN LISPRO 5 UNITS: 100 INJECTION, SOLUTION INTRAVENOUS; SUBCUTANEOUS at 11:22

## 2022-05-02 RX ADMIN — CYCLOBENZAPRINE 5 MG: 10 TABLET, FILM COATED ORAL at 06:13

## 2022-05-02 RX ADMIN — INSULIN LISPRO 5 UNITS: 100 INJECTION, SOLUTION INTRAVENOUS; SUBCUTANEOUS at 08:15

## 2022-05-02 RX ADMIN — OXYCODONE 5 MG: 5 TABLET ORAL at 09:23

## 2022-05-02 NOTE — PROGRESS NOTES
MRCP   IMPRESSION:  1. Normal caliber common duct, with no evidence of filling defect to  suggest retained common duct stone.  2. Grossly normal-appearing pancreatic duct.  3. Generally expected changes of recent gastric sleeve surgery. Trace  ascites and mild postoperative soft tissue edema. Minimal pleural  effusions noted.  4. Questionable small, roughly 2 cm ill-defined area in the right liver  lobe, dorsally adjacent to the IVC potentially an area of chemical shift  artifact. This is only seen on in-and-out of phase images. If the  patient's elevated liver enzymes persist, consider follow-up imaging of  this area to evaluate for stability.      This result has not been signed. Information might be incomplete.           OK for discharge  Needs FU Liver panel in 1 week  If further sx advised to call office

## 2022-05-02 NOTE — PLAN OF CARE
Problem: Adult Inpatient Plan of Care  Goal: Patient-Specific Goal (Individualized)  Outcome: Met  Flowsheets (Taken 5/2/2022 1442)  Patient-Specific Goals (Include Timeframe): DC home with . Garrick video played   Goal Outcome Evaluation:

## 2022-05-02 NOTE — PROGRESS NOTES
[facility] Medicine Services  PROGRESS NOTE    Patient Name: Rain Barreto  : 1985  MRN: [id number]    Date of Admission: 2022  Primary Care Physician: Yolanda Tian APRN    Subjective   Subjective     CC:  F/u diabetes management, post-op sleeve    HPI:  In bed. Tolerating liquids. No BM, +flatus. Pain improving.    Review of Systems   Constitutional: Positive for activity change and fatigue.   HENT: Negative.    Respiratory: Negative.    Cardiovascular: Negative.    Gastrointestinal: Positive for abdominal pain.   Genitourinary: Negative.    No change from  otherwise      Objective   Objective     Vital Signs:   Temp:  [98.7 °F (37.1 °C)-99.5 °F (37.5 °C)] 98.7 °F (37.1 °C)  Heart Rate:  [] 94  Resp:  [16] 16  BP: (136-159)/(78-96) 159/96  Flow (L/min):  [2] 2     Physical Exam:  NAD, alert and oriented  OP clear, MMM  Neck supple  No lAD  RRR  CTAB  +BS, soft, less tender today  No c/c/e  No rashes  SAL  Normal affect  No change otherwise from     Results Reviewed:  LAB RESULTS:      Lab 22  0640 22  1246 22  0705 22  1233 22  0633 22  1258   WBC 4.71 5.15 5.79  --  10.38 6.23   HEMOGLOBIN 11.2* 11.3* 10.5* 12.0 12.8 13.9   HEMATOCRIT 32.9* 34.9 31.5* 35.9 37.8 40.0   PLATELETS 178 192 175  --  238 247   NEUTROS ABS 3.07 3.78 4.04  --  9.49*  --    IMMATURE GRANS (ABS) 0.01 0.01 0.02  --  0.04  --    LYMPHS ABS 1.19 0.89 1.30  --  0.53*  --    MONOS ABS 0.36 0.42 0.40  --  0.31  --    EOS ABS 0.06 0.02 0.01  --  0.00  --    MCV 89.4 91.6 88.2  --  87.3 85.1         Lab 05/02/22  0640 22  1246 22  0705 22  0633 22  1258   SODIUM 136 137 135* 133*  --    POTASSIUM 3.7 3.7 3.8 4.4  --    CHLORIDE 101 103 102 99  --    CO2 22.0 23.0 25.0 16.0*  --    ANION GAP 13.0 11.0 8.0 18.0*  --    BUN 5* 7 10 10  --    CREATININE 0.45* 0.43* 0.48* 0.59  --    EGFR 126.5 127.9 124.5 118.5  --    GLUCOSE 238*  224* 258* 342*  --    CALCIUM 8.3* 8.2* 8.2* 8.8  --    HEMOGLOBIN A1C  --   --   --   --  11.60*         Lab 05/02/22  0640 05/01/22  1246 04/30/22  0705 04/29/22  0633   TOTAL PROTEIN 5.7* 5.9* 5.5* 6.3   ALBUMIN 3.10* 3.20* 3.00* 3.50   GLOBULIN 2.6 2.7 2.5 2.8   ALT (SGPT) 175* 272* 141* 99*   AST (SGOT) 76* 299* 144* 89*   BILIRUBIN 0.8 0.6 0.8 0.6   ALK PHOS 262* 320* 114 88                 Lab 04/29/22  0633   IRON 68         Brief Urine Lab Results  (Last result in the past 365 days)      Color   Clarity   Blood   Leuk Est   Nitrite   Protein   CREAT   Urine HCG        04/29/22 1826 Yellow   Clear   Negative   Negative   Negative   Negative                 Microbiology Results Abnormal     None          No radiology results from the last 24 hrs    Results for orders placed during the hospital encounter of 09/21/21    Adult Transthoracic Echo Complete W/ Cont if Necessary Per Protocol    Interpretation Summary  · Normal left ventricular cavity size and wall thickness  · Left ventricular ejection fraction appears to be 61 - 65%.  · Left ventricular diastolic function was normal.  · No aortic valve regurgitation or stenosis is present.  · Trace to mild mitral valve regurgitation is present.  · Physiologic tricuspid valve regurgitation is present.    Recommendation.    Cleared to undergo surgery with usual precaution.      I have reviewed the medications:  Scheduled Meds:cyclobenzaprine, 5 mg, Oral, Q8H  enoxaparin, 40 mg, Subcutaneous, Daily  insulin detemir, 10 Units, Subcutaneous, Q12H  insulin lispro, 0-14 Units, Subcutaneous, 4x Daily AC & at Bedtime  Insulin Lispro, 5 Units, Subcutaneous, TID With Meals  pantoprazole, 40 mg, Intravenous, Q AM      Continuous Infusions:sodium chloride, 125 mL/hr, Last Rate: 125 mL/hr (05/02/22 0125)      PRN Meds:.•  Albuterol Sulfate NEB Orderable  •  ALPRAZolam  •  dextrose  •  dextrose  •  diphenhydrAMINE  •  ferric gluconate  •  glucagon (human recombinant)  •   hydrALAZINE  •  HYDROmorphone  •  LORazepam  •  LORazepam  •  metoclopramide  •  [DISCONTINUED] HYDROmorphone **AND** naloxone  •  ondansetron **OR** ondansetron  •  oxyCODONE  •  phenol  •  prochlorperazine  •  promethazine  •  simethicone    Assessment/Plan   Assessment & Plan     Active Hospital Problems    Diagnosis  POA   • Morbid exogenous obesity (HCC) [E66.01]  Unknown   • Diabetes mellitus (HCC) [E11.9]  Yes      Resolved Hospital Problems    Diagnosis Date Resolved POA   • Cholecystitis without cholelithiasis [K81.9] 04/28/2022 Unknown        Brief Hospital Course to date:  Rain Barreto is a 36 y.o. female with Type 1 Diabetes Mellitus, morbid obesity, anxiety, depression, HLD presenting for active laparoscopic sleeve gastrectomy and and cholecystectomy.  Has been using insulin pump with several episodes of hypoglycemia.     Type I Diabetes  -hypoglycemia with insulin pump, and unclear how to adjust basal rate  -home on basal/bolus/SSI at discharge, with referral to endocrinology at discharge for insulin pump management, will order prescriptions at discharge and make referral    Post-op anemia  -thought secondary to port site bleeding  -repeat labs pending    Elevated LFT  -MRCP pending, GI/bariatric surgery to revisit today  -improved today however    Morbid Obesity s/p Gastric Sleeve  -management per Dr. Finnegan    Anxiety/Depression  - continue home meds     DVT prophylaxis:  Medical and mechanical DVT prophylaxis orders are present.       AM-PAC 6 Clicks Score (PT): 22 (05/02/22 0800)    Disposition: I expect the patient to be discharged 1-2 days, needs better blood sugar control and still having some significant issues with pain.    CODE STATUS:   Code Status and Medical Interventions:   Ordered at: 04/28/22 1332     Level Of Support Discussed With:    Patient     Code Status (Patient has no pulse and is not breathing):    CPR (Attempt to Resuscitate)     Medical Interventions (Patient has pulse or  is breathing):    Full Support       James Munoz MD  05/02/22

## 2022-05-02 NOTE — CASE MANAGEMENT/SOCIAL WORK
Case Management Discharge Note      Final Note: Pt to discharge home today with  and children. CM spoke with pt at bedside. Pt denies discharge needs and will have private transportation home.    Provided Post Acute Provider List?: N/A  Provided Post Acute Provider Quality & Resource List?: N/A    Selected Continued Care - Admitted Since 4/28/2022                  Final Discharge Disposition Code: 01 - home or self-care

## 2022-05-02 NOTE — PLAN OF CARE
VSS at this time. 2L NC. NSR. MRCP done this shift. C/o pain after procedure; see MAR. Pt states pain is improved. No c/o N/V. No requests at this time.      Goal Outcome Evaluation:

## 2022-05-02 NOTE — PROGRESS NOTES
"POD#4  LSG/ella/EGD  \"want to go home\"    Her  is in the room.  He reminded me that he plans on having metabolic bariatric surgery with me as well.  She feels better and wants to go home.  Wants to take a shower and apparently told she could not.  Symptoms controlled with oral medication.  No jaundice or dark urine.  Chronic preoperative nausea unchanged after surgery and pathology of the gallbladder was normal.  Ambulating voiding well.  No pulmonary complaints.  Spirometer 2000 with this.  Passing flatus, no bowel movement.  Requesting stool softener told her she can take milk of magnesia, MiraLAX etc. or anything else over-the-counter.  T-max nine 9.3 currently 90.7 pulse 94 respiration 16 blood pressure 159/96 [place] 91% UO 3450 - 600 she is in no apparent distress.  No scleral icterus.  Abdomen soft, tender in the left upper quadrant is in location without guarding.  No mass or hernia or induration appreciated.  No cellulitis or wound drainage.  Bowel sounds active.  Hepatitis panel negative.  CMP with multiple abnormalities including glucose 238 BUN 5 creatinine 0.45 calcium 8.3 total protein 5.7 down to 2.10.  Liver function test elevated but improving with  AST 76 alk phos of 262 normal bilirubin 0.8.  White count normal at 4.7 with normal differential.  H&H stable 11.2 and 32.9.  MRCP completed, report pending    Impression: Postop care for laparoscopic gastrectomy, cholecystectomy.  Liver function test trending down, MRCP report pending.  Non- iron deficiency anemia without evidence of bleeding and H&H stable on Lovenox.  Longstanding poorly controlled type I insulin-dependent diabetes mellitus A1c 11.6.  Hypoalbuminemia.  MRSA positive.  Chronic nausea without improvement with cholecystectomy.  Pathology of the stomach and gallbladder unremarkable.  Preoperative gastric emptying study normal.  Etiology not entirely clear.    Plan: Discharge home if okay with GI and internal medicine.  May " shower.  Discharge instructions discussed.  Prefers oxycodone for discharge rather than Dilaudid.  Requests Flexeril as well.

## 2022-05-03 NOTE — OUTREACH NOTE
Prep Survey    Flowsheet Row Responses   [facility] facility patient discharged from? [place]   Is LACE score < 7 ? No   Emergency Room discharge w/ pulse ox? No   Eligibility Readm Mgmt   Discharge diagnosis laparoscopic cholecystectomy and sleeve gastrectomy on 4/28/2022,  elevated LFTs   Does the patient have one of the following disease processes/diagnoses(primary or secondary)? General Surgery   Does the patient have Home health ordered? No   Is there a DME ordered? No   Comments regarding appointments See AVS   Medication alerts for this patient insulin   Prep survey completed? Yes          JOEY HUERTA - Registered Nurse

## 2022-05-04 NOTE — DISCHARGE SUMMARY
DOA:  4/28/22    DOD:  5/2/22    Admission Diagnosis:   Morbid Obesity with Multiple Co-morbidities, chronic cholecystitis without cholelithiasis    Discharge Diagnosis:  Same    Other diagnoses: Anxiety and depression, type I poorly controlled insulin-dependent diabetes mellitus, hyperlipidemia, hypertension, nephrolithiasis, migraine headaches, diverticulosis    Principal Procedure Performed:   Laparoscopic sleeve gastrectomy, laparoscopic cholecystectomy, EGD    Other procedures performed: Upper GI, MRCP    Complications: None    Consultations: Hospitalist, gastroenterology    History of present illness:  This is a 36-year-old morbidly obese patient who presents for elective laparoscopic sleeve gastrectomy, cholecystectomy, and EGD.  The preoperative testing and evaluation is in order and the patient is admitted for elective surgery.    Hospital Course:  The patient was admitted and underwent uneventful surgery as described.  She was MRSA positive on preoperative testing and did receive vancomycin in addition to Ancef for intraoperative antibiotic prophylaxis.  Postoperatively the patient was transferred to the bariatric telemetry unit. Upper GI on postoperative day #1 was unremarkable.  She was complaining of severe pain in her epigastrium, dizziness, and tachycardia.  She says she almost passed out during her upper GI and that without pain medication you can even touch her abdomen.  She was bolused normal saline and stat H&H 12.8 and 37.8 which was relatively stable from preop, urine output was excellent.  She was noted to have some elevated transaminases felt likely to be related to liver retraction during surgery and less likely a complication of cholecystectomy.  Her glucose was 342 and the hospitalist service was consulted.  They noted she had been using her insulin pump with several episodes of hypoglycemia and that she would be discharged on Levemir with plans to follow-up with endocrinology.  On postop  day #2 her pain was poorly controlled and there was swelling and tenderness at her 5 mm entrance trocar site incision in the left upper quadrant.  No associated ecchymosis.  She was tolerating her diet well. Her H&H had decreased to 10.5 and 31.5 with hydration and possible presumed abdominal wall hematoma at her tender trocar site incision.   Urinalysis showed greater than thousand glucose and 80 of ketones.  Flexeril was added as an adjunct to narcotics for her pain and she was encouraged to try oral narcotics before IV narcotics.   By hospital day #3 she began feeling better however her liver enzymes were slowly increasing with an ALT of 272  alkaline phosphatase 320, normal bilirubin.  GI was consulted and ordered an MRCP.  This showed normal caliber common bile duct without filling defects, grossly normal pancreatic duct, changes of sleeve gastrectomy with trace ascites and mild postoperative soft tissue edema minimal pleural effusions noted and a questionable small roughly 2 cm ill-defined area in the right liver lobe dorsally adjacent to the IVC potentially an area of chemical shift artifact seen only on in and out phase images with the recommendation that if the elevated enzymes persist consider follow-up imaging of the area to evaluate for stability.  At the time of discharge on postop day #4 the patient felt better and wanted to go home.  Her chronic preoperative nausea was unchanged after surgery and pathology of her gallbladder was unremarkable.  She previously had a normal preoperative gastric emptying study and her chronic nausea is unexplained.  Hepatitis panel was negative.  Liver function tests were now improving with an ALT of 175 AST of 76 alkaline phosphatase of 262.  Once again normal bilirubin discharge white count normal at 4.7 with a normal differential.  Discharge H&H stable at 11.2 and 32.9.  Of concern her total protein was 5.7 and albumin was 3.10.  Pathology of the stomach was  unremarkable.  Dr. Lopez with GI felt she was okay for discharge with follow-up liver panel in 1 week and if further symptoms advised to call the office.  Medicine was okay with her going home and sent her home with basal bolus and sliding scale insulin at discharge with referral to endocrinology discharge for insulin pump management and ordered the prescriptions and made the referral.  At the time of discharge she is tolerating her diet, ambulating and voiding well.  No pulmonary complaints, passing flatus.  Spirometer 2000.  T-max 99.3 and most recent 98.7.  Pulse 94 respiration 16 blood pressure 159/96 room air saturation 91%.  Abdomen was soft with tenderness in the left upper quadrant in the area of the 5 mm entrance trocar site without induration, mass, hernia appreciated no cellulitis or wound drainage.  Bowel sounds active.  She is discharged home in good condition.  She may shower.  Discharge instructions were discussed.  Oxycodone requested rather than Dilaudid.  Requested Flexeril prescription which was given as well.  Follow-up in the office in approximately 1 week and call in the meantime with any problems questions or concerns.

## 2022-05-05 ENCOUNTER — READMISSION MANAGEMENT (OUTPATIENT)
Dept: CALL CENTER | Facility: HOSPITAL | Age: 37
End: 2022-05-05

## 2022-05-05 NOTE — OUTREACH NOTE
General Surgery Week 1 Survey    Flowsheet Row Responses   [facility], [place], operated by [facility] patient discharged from? [place]   Does the patient have one of the following disease processes/diagnoses(primary or secondary)? General Surgery   Week 1 attempt successful? Yes   Call start time 1450   Call end time 1520   Discharge diagnosis laparoscopic cholecystectomy and sleeve gastrectomy on 4/28/2022,  elevated LFTs   Is patient permission given to speak with other caregiver? Yes   Person spoke with today (if not patient) and relationship Carlos Alberto    Meds reviewed with patient/caregiver? Yes   Is the patient having any side effects they believe may be caused by any medication additions or changes? No   Does the patient have all medications related to this admission filled (includes all antibiotics, pain medications, etc.) Yes   Is the patient taking all medications as directed (includes completed medication regime)? Yes   Does the patient have a follow up appointment scheduled with their surgeon? Yes   Has the patient kept scheduled appointments due by today? N/A   Comments Has a followup with surgeon on Monday 5/9/2022   Has home health visited the patient within 72 hours of discharge? N/A   Has all DME been delivered? No   Psychosocial issues? No   Did the patient receive a copy of their discharge instructions? Yes   Nursing interventions Reviewed instructions with patient   What is the patient's perception of their health status since discharge? Improving   Nursing interventions Nurse provided patient education   Is the patient /caregiver able to teach back basic post-op care? Keep incision areas clean,dry and protected   Is the patient/caregiver able to teach back signs and symptoms of incisional infection? Fever, Increased redness, swelling or pain at the incisonal site   Is the patient/caregiver able to teach back steps to recovery at home? Set small, achievable goals for return to baseline health, Rest and rebuild  strength, gradually increase activity, Make a list of questions for surgeon's appointment   If the patient is a current smoker, are they able to teach back resources for cessation? Not a smoker   Is the patient/caregiver able to teach back the hierarchy of who to call/visit for symptoms/problems? PCP, Specialist, Home health nurse, Urgent Care, ED, 911 Yes   Week 1 call completed? Yes   Wrap up additional comments Patient reports she has pain, sleeping and not quite getting in her 60 ounces a day. She is urinating and having bowel movements. She has had bowel movement . If worsening of symtpoms  will call MD. ADY OSORIO - Registered Nurse

## 2022-05-06 NOTE — PROGRESS NOTES
[facility] Bariatric Surgery  [address]  [address]  [facility] [address]  [phone]      Patient Name:  Rain Barreto  :  1985      Date of Visit: 2022      Reason for Visit:  POD #11    HPI:  Rain Barreto is a 36 y.o. female s/p LSG/ella 22 GDW    Discharged on POD#4.  Patient had uneventful surgery and upper GI on postop day #1 was unremarkable.  She was complaining of severe pain in her epigastrium with dizziness and tachycardia.  She was bolused with normal saline and H&H was stable from preop.  On postop day #2 her pain remained poorly controlled and she had swelling and tenderness at her 5 mm trocar incision site.  H&H decreased to 10.5 with hydration and possible abdominal wall hematoma.  By hospital day #3 she began feeling better but her liver enzymes were slowly increasing.  GI was consulted and ordered MRCP that showed normal caliber common bile duct without filling defects, grossly normal pancreatic duct, and a questionable small roughly 2 cm ill-defined area in the right liver lobe dorsally adjacent to the IVC with recommendation that his elevated enzymes persist then consider follow-up imaging of that area.  She felt better by postop day #4.  Her chronic preoperative nausea was unchanged after surgery.      Patient presents to the office today with complaints of diffuse abdominal pain, nausea/vomiting, dysphagia, dizziness, shortness of breath, and weakness.  She tells me she was doing okay initially after she was discharged on May 2.  She has had diffuse abdominal pain intermittently since surgery, but she felt that it had been improving.  She also has a history of chronic nausea that predated surgery.  The most protein intake she was ever able to get in a day was around 50 g.  She began having worsening abdominal pain on Friday and this acutely worsened on Saturday.  Her p.o. intake has been very low for the last 2 days and she started  "vomiting this morning.  She is still voiding and her urine is pale yellow.  No dark urine or jaundice.  She has only had 1 bowel movement since discharge.  Her main complaint today is her abdominal discomfort and generalized weakness.  She has not had any fevers.  Tolerating diet progression - on stage 2.  Getting <50g prot/day.  Drinking <64 fluid oz/day.  Not yet taking vitamins.  On Omeprazole .  Holding ASA , NSAIDs , Tramadol, Hormones, Diuretics , Steroids and Immunologics.    Final Diagnosis   1.  GALLBLADDER, CHOLECYSTECTOMY:  No significant histopathologic change.  No metaplasia or dysplasia identified.    2.  STOMACH, SUBTOTAL GASTRECTOMY:                Gastric tissue with no signficant histopathologic change.  Negative for intestinal metaplasia and dysplasia.  No Helicobacter pylori-like organisms identified on routine stains.          Presurgery weight: 192 pounds.  Today's weight is 78.2 kg (172 lb 8 oz) pounds, today's  Body mass index is 32.59 kg/m²., and weight loss since surgery is 20 pounds.       Past Medical History:   Diagnosis Date   • Anxiety    • Depression    • Diabetes mellitus (HCC)     Type I, has detachable pump. dx in    • Diverticulosis    • Dyspepsia    • Gallstones    • Heartburn     takes prn zantac and tums, EGD  wit dilatation, EGD Dr. Finnegan 21   • HLD (hyperlipidemia)    • Hypertension     denies   • Kidney infection    • Kidney stone    • Migraine    • MRSA carrier     PAT (+) 22   •  (normal spontaneous vaginal delivery)     1st child w/ preeclampsia   • PONV (postoperative nausea and vomiting)    • Tattoos    • Wears dentures     upper   • Wheezing     \"seasonal allergies\" uses inhalers prn     Past Surgical History:   Procedure Laterality Date   •  SECTION     •  SECTION     •  SECTION     • CHOLECYSTECTOMY N/A 2022    Procedure: CHOLECYSTECTOMY LAPAROSCOPIC;  Surgeon: Yunior Finnegan MD;  Location: [facility]" OR;  Service: General;  Laterality: N/A;   • COLONOSCOPY N/A 04/15/2019    Procedure: COLONOSCOPY W/ COLD FORCEP BIOPSIES; COLD SNARE POLYPECTOMY; COLD FORCEP POLYPECTOMY; RESOLUTION CLIP X1;  Surgeon: Adán Pena MD;  Location: [facility] ENDOSCOPY;  Service: Gastroenterology   • D & C HYSTEROSCOPY ENDOMETRIAL ABLATION  2015   • ENDOSCOPY N/A 03/21/2019    Procedure: ESOPHAGOGASTRODUODENOSCOPY with cold biopsies and dilation;  Surgeon: Adán Pena MD;  Location: [facility] ENDOSCOPY;  Service: Gastroenterology   • ENDOSCOPY N/A 4/28/2022    Procedure: ESOPHAGOGASTRODUODENOSCOPY;  Surgeon: Yunior Finnegan MD;  Location:  [facility] OR;  Service: Bariatric;  Laterality: N/A;   • GASTRIC SLEEVE LAPAROSCOPIC N/A 4/28/2022    Procedure: GASTRIC SLEEVE LAPAROSCOPIC;  Surgeon: Yunior Finnegan MD;  Location:  [facility] OR;  Service: Bariatric;  Laterality: N/A;   • TEETH EXTRACTION      dentures on upper plate   • TOTAL ABDOMINAL HYSTERECTOMY  2019    low transverse.  [facility] converted to open for scar tissue   • TUBAL ABDOMINAL LIGATION  2004     Outpatient Medications Marked as Taking for the 5/9/22 encounter (Office Visit) with Bridget Andino PA   Medication Sig Dispense Refill   • cyclobenzaprine (FLEXERIL) 10 MG tablet Take 1 tablet by mouth 3 (Three) Times a Day As Needed for Muscle Spasms. 21 tablet 0   • omeprazole (priLOSEC) 40 MG capsule Take 1 capsule by mouth Daily for 60 days. 60 capsule 0   • ondansetron (ZOFRAN) 4 MG tablet Take 4 mg by mouth Every 8 (Eight) Hours As Needed for Nausea.     • promethazine (PHENERGAN) 12.5 MG tablet Take 1 tablet by mouth Every 4 (Four) Hours As Needed for Nausea. 12 tablet 0     Allergies   Allergen Reactions   • Morphine Itching   • Adhesive Tape Dermatitis     Luisa. Tele electrodes   • Hydrocodone Itching and Rash   • Jardiance [Empagliflozin] Itching and Rash   • Latex Rash   • Trulicity [Dulaglutide] Nausea Only       Social History     Socioeconomic History  "  • Marital status:    Tobacco Use   • Smoking status: Former Smoker     Packs/day: 0.25     Years: 9.00     Pack years: 2.25     Types: Cigarettes     Quit date:      Years since quittin.3   • Smokeless tobacco: Never Used   Vaping Use   • Vaping Use: Never used   Substance and Sexual Activity   • Alcohol use: Not Currently     Comment: social   • Drug use: No   • Sexual activity: Defer     Social History     Social History Narrative    Lives in [place], close to [place].   with four children.  Works as a groomer.         /100 (BP Location: Left arm, Patient Position: Sitting, Cuff Size: Large Adult)   Pulse (!) 135   Temp 96.4 °F (35.8 °C) (Temporal)   Resp 18   Ht 154.9 cm (61\")   Wt 78.2 kg (172 lb 8 oz)   LMP  (LMP Unknown)   SpO2 100%   BMI 32.59 kg/m²   Physical Exam  Constitutional:       General: She is not in acute distress.     Comments: Patient appears uncomfortable   Eyes:      General: No scleral icterus.  Cardiovascular:      Rate and Rhythm: Regular rhythm. Tachycardia present.      Heart sounds: Normal heart sounds.   Pulmonary:      Breath sounds: Normal breath sounds.   Abdominal:      General: Bowel sounds are normal. There is no distension.      Palpations: Abdomen is soft. There is no mass.      Tenderness: There is abdominal tenderness. There is no guarding or rebound.      Comments: Diffuse abdominal tenderness to palpation.  Lap incisions healing well.  No erythema, drainage, induration, fluctuance   Skin:     General: Skin is warm and dry.      Coloration: Skin is not jaundiced.   Neurological:      Mental Status: She is alert and oriented to person, place, and time.   Psychiatric:         Mood and Affect: Mood normal.         Behavior: Behavior normal.           Assessment:   POD #11    Class 2 Severe Obesity (BMI >=35 and <=39.9). Obesity-related health conditions include the following: as above . Obesity is improving with treatment. BMI is is " above average; BMI management plan is completed. We discussed low calorie, low carb based diet program, portion control and increasing exercise.      Plan:    Patient with diffuse abdominal pain, shortness of breath, nausea/vomiting, generalized weakness, and tachycardia in office today.  I discussed with Dr. Finnegan and he agrees patient should present to the ED for further evaluation.  Her  presents with her today and will take her directly to [facility] ER.    Continue to advance diet per manual.  Increase protein 100g/day.  Increase exercise/activity as tolerated.  Reviewed lifting restrictions, nothing >25 lbs x 2 more weeks.  Start vitamins as discussed.  Continue PPI.  Continue to avoid ASA/NSAIDs/tobacco x 6 weeks postop, steroids x 8 weeks postop.     The patient was instructed to follow up in 3 weeks, sooner if needed.

## 2022-05-09 ENCOUNTER — APPOINTMENT (OUTPATIENT)
Dept: CT IMAGING | Facility: HOSPITAL | Age: 37
End: 2022-05-09

## 2022-05-09 ENCOUNTER — READMISSION MANAGEMENT (OUTPATIENT)
Dept: CALL CENTER | Facility: HOSPITAL | Age: 37
End: 2022-05-09

## 2022-05-09 ENCOUNTER — HOSPITAL ENCOUNTER (INPATIENT)
Facility: HOSPITAL | Age: 37
LOS: 3 days | Discharge: HOME | End: 2022-05-12
Attending: EMERGENCY MEDICINE | Admitting: INTERNAL MEDICINE

## 2022-05-09 ENCOUNTER — APPOINTMENT (OUTPATIENT)
Dept: GENERAL RADIOLOGY | Facility: HOSPITAL | Age: 37
End: 2022-05-09

## 2022-05-09 ENCOUNTER — OFFICE VISIT (OUTPATIENT)
Dept: BARIATRICS/WEIGHT MGMT | Facility: CLINIC | Age: 37
End: 2022-05-09

## 2022-05-09 VITALS
DIASTOLIC BLOOD PRESSURE: 100 MMHG | SYSTOLIC BLOOD PRESSURE: 158 MMHG | OXYGEN SATURATION: 100 % | HEIGHT: 61 IN | WEIGHT: 172.5 LBS | RESPIRATION RATE: 18 BRPM | TEMPERATURE: 96.4 F | BODY MASS INDEX: 32.57 KG/M2 | HEART RATE: 135 BPM

## 2022-05-09 DIAGNOSIS — E66.9 OBESITY, CLASS I, BMI 30-34.9: Primary | ICD-10-CM

## 2022-05-09 DIAGNOSIS — R11.0 NAUSEA: ICD-10-CM

## 2022-05-09 DIAGNOSIS — R10.84 GENERALIZED ABDOMINAL PAIN: ICD-10-CM

## 2022-05-09 DIAGNOSIS — E10.10 DIABETIC KETOACIDOSIS WITHOUT COMA ASSOCIATED WITH TYPE 1 DIABETES MELLITUS: Primary | ICD-10-CM

## 2022-05-09 DIAGNOSIS — K81.9 CHOLECYSTITIS WITHOUT CHOLELITHIASIS: ICD-10-CM

## 2022-05-09 DIAGNOSIS — Z90.3 H/O GASTRIC SLEEVE: ICD-10-CM

## 2022-05-09 PROBLEM — R80.9 PROTEINURIA: Status: ACTIVE | Noted: 2022-05-09

## 2022-05-09 PROBLEM — E87.1 HYPONATREMIA: Status: ACTIVE | Noted: 2022-05-09

## 2022-05-09 PROBLEM — N39.0 UTI (URINARY TRACT INFECTION): Status: ACTIVE | Noted: 2022-05-09

## 2022-05-09 LAB
ALBUMIN SERPL-MCNC: 4.1 G/DL (ref 3.5–5.2)
ALBUMIN SERPL-MCNC: 4.3 G/DL (ref 3.5–5.2)
ALBUMIN/GLOB SERPL: 1 G/DL
ALBUMIN/GLOB SERPL: 1.3 G/DL
ALP SERPL-CCNC: 162 U/L (ref 39–117)
ALP SERPL-CCNC: 199 U/L (ref 39–117)
ALT SERPL W P-5'-P-CCNC: 21 U/L (ref 1–33)
ALT SERPL W P-5'-P-CCNC: 27 U/L (ref 1–33)
ANION GAP SERPL CALCULATED.3IONS-SCNC: 20 MMOL/L (ref 5–15)
ANION GAP SERPL CALCULATED.3IONS-SCNC: 27 MMOL/L (ref 5–15)
ARTERIAL PATENCY WRIST A: ABNORMAL
AST SERPL-CCNC: 11 U/L (ref 1–32)
AST SERPL-CCNC: 9 U/L (ref 1–32)
ATMOSPHERIC PRESS: ABNORMAL MM[HG]
B-OH-BUTYR SERPL-SCNC: 6.71 MMOL/L (ref 0.02–0.27)
BACTERIA UR QL AUTO: ABNORMAL /HPF
BASE EXCESS BLDA CALC-SCNC: -19 MMOL/L (ref 0–2)
BASOPHILS # BLD AUTO: 0.06 10*3/MM3 (ref 0–0.2)
BASOPHILS # BLD AUTO: 0.06 10*3/MM3 (ref 0–0.2)
BASOPHILS NFR BLD AUTO: 0.5 % (ref 0–1.5)
BASOPHILS NFR BLD AUTO: 0.6 % (ref 0–1.5)
BDY SITE: ABNORMAL
BILIRUB SERPL-MCNC: 0.3 MG/DL (ref 0–1.2)
BILIRUB SERPL-MCNC: 0.3 MG/DL (ref 0–1.2)
BILIRUB UR QL STRIP: NEGATIVE
BODY TEMPERATURE: 37 C
BUN SERPL-MCNC: 13 MG/DL (ref 6–20)
BUN SERPL-MCNC: 7 MG/DL (ref 6–20)
BUN/CREAT SERPL: 10.8 (ref 7–25)
BUN/CREAT SERPL: 14.6 (ref 7–25)
CA-I SERPL ISE-MCNC: 1.33 MMOL/L (ref 1.12–1.32)
CALCIUM SPEC-SCNC: 8.8 MG/DL (ref 8.6–10.5)
CALCIUM SPEC-SCNC: 9.3 MG/DL (ref 8.6–10.5)
CHLORIDE SERPL-SCNC: 107 MMOL/L (ref 98–107)
CHLORIDE SERPL-SCNC: 96 MMOL/L (ref 98–107)
CK SERPL-CCNC: 34 U/L (ref 20–180)
CLARITY UR: CLEAR
CO2 BLDA-SCNC: 8.3 MMOL/L (ref 22–33)
CO2 SERPL-SCNC: 6 MMOL/L (ref 22–29)
CO2 SERPL-SCNC: 8 MMOL/L (ref 22–29)
COHGB MFR BLD: 0.8 % (ref 0–2)
COLOR UR: YELLOW
CREAT SERPL-MCNC: 0.65 MG/DL (ref 0.57–1)
CREAT SERPL-MCNC: 0.89 MG/DL (ref 0.57–1)
D-LACTATE SERPL-SCNC: 1.4 MMOL/L (ref 0.5–2)
DEPRECATED RDW RBC AUTO: 39.3 FL (ref 37–54)
DEPRECATED RDW RBC AUTO: 40 FL (ref 37–54)
EGFRCR SERPLBLD CKD-EPI 2021: 115.7 ML/MIN/1.73
EGFRCR SERPLBLD CKD-EPI 2021: 85.2 ML/MIN/1.73
EOSINOPHIL # BLD AUTO: 0.02 10*3/MM3 (ref 0–0.4)
EOSINOPHIL # BLD AUTO: 0.03 10*3/MM3 (ref 0–0.4)
EOSINOPHIL NFR BLD AUTO: 0.2 % (ref 0.3–6.2)
EOSINOPHIL NFR BLD AUTO: 0.3 % (ref 0.3–6.2)
ERYTHROCYTE [DISTWIDTH] IN BLOOD BY AUTOMATED COUNT: 12.9 % (ref 12.3–15.4)
ERYTHROCYTE [DISTWIDTH] IN BLOOD BY AUTOMATED COUNT: 13 % (ref 12.3–15.4)
FLUAV RNA RESP QL NAA+PROBE: NOT DETECTED
FLUBV RNA RESP QL NAA+PROBE: NOT DETECTED
GLOBULIN UR ELPH-MCNC: 3.2 GM/DL
GLOBULIN UR ELPH-MCNC: 4.3 GM/DL
GLUCOSE BLDC GLUCOMTR-MCNC: 223 MG/DL (ref 70–130)
GLUCOSE BLDC GLUCOMTR-MCNC: 240 MG/DL (ref 70–130)
GLUCOSE BLDC GLUCOMTR-MCNC: 242 MG/DL (ref 70–130)
GLUCOSE BLDC GLUCOMTR-MCNC: 246 MG/DL (ref 70–130)
GLUCOSE BLDC GLUCOMTR-MCNC: 250 MG/DL (ref 70–130)
GLUCOSE BLDC GLUCOMTR-MCNC: 265 MG/DL (ref 70–130)
GLUCOSE BLDC GLUCOMTR-MCNC: 274 MG/DL (ref 70–130)
GLUCOSE SERPL-MCNC: 274 MG/DL (ref 65–99)
GLUCOSE SERPL-MCNC: 379 MG/DL (ref 65–99)
GLUCOSE UR STRIP-MCNC: ABNORMAL MG/DL
GRAN CASTS URNS QL MICRO: ABNORMAL /LPF
HBA1C MFR BLD: 10.8 % (ref 4.8–5.6)
HCO3 BLDA-SCNC: 7.6 MMOL/L (ref 20–26)
HCT VFR BLD AUTO: 42.3 % (ref 34–46.6)
HCT VFR BLD AUTO: 47.2 % (ref 34–46.6)
HCT VFR BLD CALC: 44.6 % (ref 38–51)
HGB BLD-MCNC: 14.4 G/DL (ref 12–15.9)
HGB BLD-MCNC: 16.3 G/DL (ref 12–15.9)
HGB BLDA-MCNC: 14.6 G/DL (ref 14–18)
HGB UR QL STRIP.AUTO: ABNORMAL
HOLD SPECIMEN: NORMAL
HYALINE CASTS UR QL AUTO: ABNORMAL /LPF
IMM GRANULOCYTES # BLD AUTO: 0.05 10*3/MM3 (ref 0–0.05)
IMM GRANULOCYTES # BLD AUTO: 0.06 10*3/MM3 (ref 0–0.05)
IMM GRANULOCYTES NFR BLD AUTO: 0.5 % (ref 0–0.5)
IMM GRANULOCYTES NFR BLD AUTO: 0.5 % (ref 0–0.5)
INHALED O2 CONCENTRATION: 21 %
KETONES UR QL STRIP: ABNORMAL
LEUKOCYTE ESTERASE UR QL STRIP.AUTO: NEGATIVE
LIPASE SERPL-CCNC: 119 U/L (ref 13–60)
LYMPHOCYTES # BLD AUTO: 0.92 10*3/MM3 (ref 0.7–3.1)
LYMPHOCYTES # BLD AUTO: 0.97 10*3/MM3 (ref 0.7–3.1)
LYMPHOCYTES NFR BLD AUTO: 7.9 % (ref 19.6–45.3)
LYMPHOCYTES NFR BLD AUTO: 9.4 % (ref 19.6–45.3)
Lab: ABNORMAL
MAGNESIUM SERPL-MCNC: 1.9 MG/DL (ref 1.6–2.6)
MAGNESIUM SERPL-MCNC: 2 MG/DL (ref 1.6–2.6)
MCH RBC QN AUTO: 29.4 PG (ref 26.6–33)
MCH RBC QN AUTO: 29.6 PG (ref 26.6–33)
MCHC RBC AUTO-ENTMCNC: 34 G/DL (ref 31.5–35.7)
MCHC RBC AUTO-ENTMCNC: 34.5 G/DL (ref 31.5–35.7)
MCV RBC AUTO: 85.2 FL (ref 79–97)
MCV RBC AUTO: 87 FL (ref 79–97)
METHGB BLD QL: 0.4 % (ref 0–1.5)
MODALITY: ABNORMAL
MONOCYTES # BLD AUTO: 0.6 10*3/MM3 (ref 0.1–0.9)
MONOCYTES # BLD AUTO: 0.63 10*3/MM3 (ref 0.1–0.9)
MONOCYTES NFR BLD AUTO: 5.4 % (ref 5–12)
MONOCYTES NFR BLD AUTO: 5.8 % (ref 5–12)
MUCOUS THREADS URNS QL MICRO: ABNORMAL /HPF
NEUTROPHILS NFR BLD AUTO: 8.57 10*3/MM3 (ref 1.7–7)
NEUTROPHILS NFR BLD AUTO: 83.4 % (ref 42.7–76)
NEUTROPHILS NFR BLD AUTO: 85.5 % (ref 42.7–76)
NEUTROPHILS NFR BLD AUTO: 9.89 10*3/MM3 (ref 1.7–7)
NITRITE UR QL STRIP: NEGATIVE
NOTE: ABNORMAL
NOTIFIED BY: ABNORMAL
NOTIFIED WHO: ABNORMAL
NRBC BLD AUTO-RTO: 0 /100 WBC (ref 0–0.2)
NRBC BLD AUTO-RTO: 0 /100 WBC (ref 0–0.2)
NT-PROBNP SERPL-MCNC: 55.9 PG/ML (ref 0–450)
OSMOLALITY SERPL: 296 MOSM/KG (ref 275–295)
OXYHGB MFR BLDV: 97.5 % (ref 94–99)
PCO2 BLDA: 21.3 MM HG (ref 35–45)
PCO2 TEMP ADJ BLD: 21.3 MM HG (ref 35–45)
PH BLDA: 7.16 PH UNITS (ref 7.35–7.45)
PH UR STRIP.AUTO: <=5 [PH] (ref 5–8)
PH, TEMP CORRECTED: 7.16 PH UNITS
PHOSPHATE SERPL-MCNC: 1.2 MG/DL (ref 2.5–4.5)
PLATELET # BLD AUTO: 375 10*3/MM3 (ref 140–450)
PLATELET # BLD AUTO: 426 10*3/MM3 (ref 140–450)
PMV BLD AUTO: 10 FL (ref 6–12)
PMV BLD AUTO: 9.9 FL (ref 6–12)
PO2 BLDA: 106 MM HG (ref 83–108)
PO2 TEMP ADJ BLD: 106 MM HG (ref 83–108)
POTASSIUM SERPL-SCNC: 3.7 MMOL/L (ref 3.5–5.2)
POTASSIUM SERPL-SCNC: 3.7 MMOL/L (ref 3.5–5.2)
PROCALCITONIN SERPL-MCNC: 0.06 NG/ML (ref 0–0.25)
PROT SERPL-MCNC: 7.3 G/DL (ref 6–8.5)
PROT SERPL-MCNC: 8.6 G/DL (ref 6–8.5)
PROT UR QL STRIP: ABNORMAL
RBC # BLD AUTO: 4.86 10*6/MM3 (ref 3.77–5.28)
RBC # BLD AUTO: 5.54 10*6/MM3 (ref 3.77–5.28)
RBC # UR STRIP: ABNORMAL /HPF
REF LAB TEST METHOD: ABNORMAL
RSV RNA NPH QL NAA+NON-PROBE: NOT DETECTED
SARS-COV-2 RNA RESP QL NAA+PROBE: NOT DETECTED
SODIUM SERPL-SCNC: 129 MMOL/L (ref 136–145)
SODIUM SERPL-SCNC: 135 MMOL/L (ref 136–145)
SP GR UR STRIP: 1.03 (ref 1–1.03)
SQUAMOUS #/AREA URNS HPF: ABNORMAL /HPF
TROPONIN T SERPL-MCNC: <0.01 NG/ML (ref 0–0.03)
UROBILINOGEN UR QL STRIP: ABNORMAL
VENTILATOR MODE: ABNORMAL
WBC # UR STRIP: ABNORMAL /HPF
WBC CASTS #/AREA URNS LPF: ABNORMAL /LPF
WBC NRBC COR # BLD: 10.28 10*3/MM3 (ref 3.4–10.8)
WBC NRBC COR # BLD: 11.58 10*3/MM3 (ref 3.4–10.8)
WHOLE BLOOD HOLD SPECIMEN: NORMAL
WHOLE BLOOD HOLD SPECIMEN: NORMAL

## 2022-05-09 PROCEDURE — 82962 GLUCOSE BLOOD TEST: CPT

## 2022-05-09 PROCEDURE — 0 IOPAMIDOL PER 1 ML: Performed by: EMERGENCY MEDICINE

## 2022-05-09 PROCEDURE — 84145 PROCALCITONIN (PCT): CPT | Performed by: INTERNAL MEDICINE

## 2022-05-09 PROCEDURE — 82375 ASSAY CARBOXYHB QUANT: CPT

## 2022-05-09 PROCEDURE — 83880 ASSAY OF NATRIURETIC PEPTIDE: CPT | Performed by: NURSE PRACTITIONER

## 2022-05-09 PROCEDURE — 71275 CT ANGIOGRAPHY CHEST: CPT

## 2022-05-09 PROCEDURE — 93005 ELECTROCARDIOGRAM TRACING: CPT | Performed by: EMERGENCY MEDICINE

## 2022-05-09 PROCEDURE — 25010000002 ONDANSETRON PER 1 MG: Performed by: NURSE PRACTITIONER

## 2022-05-09 PROCEDURE — 81001 URINALYSIS AUTO W/SCOPE: CPT | Performed by: EMERGENCY MEDICINE

## 2022-05-09 PROCEDURE — 83690 ASSAY OF LIPASE: CPT | Performed by: EMERGENCY MEDICINE

## 2022-05-09 PROCEDURE — 83735 ASSAY OF MAGNESIUM: CPT | Performed by: INTERNAL MEDICINE

## 2022-05-09 PROCEDURE — 83735 ASSAY OF MAGNESIUM: CPT | Performed by: NURSE PRACTITIONER

## 2022-05-09 PROCEDURE — 83050 HGB METHEMOGLOBIN QUAN: CPT

## 2022-05-09 PROCEDURE — 36600 WITHDRAWAL OF ARTERIAL BLOOD: CPT

## 2022-05-09 PROCEDURE — 93005 ELECTROCARDIOGRAM TRACING: CPT

## 2022-05-09 PROCEDURE — 83605 ASSAY OF LACTIC ACID: CPT | Performed by: NURSE PRACTITIONER

## 2022-05-09 PROCEDURE — 82010 KETONE BODYS QUAN: CPT | Performed by: NURSE PRACTITIONER

## 2022-05-09 PROCEDURE — 25010000002 HYDROMORPHONE PER 4 MG: Performed by: EMERGENCY MEDICINE

## 2022-05-09 PROCEDURE — 0 POTASSIUM CHLORIDE PER 2 MEQ: Performed by: INTERNAL MEDICINE

## 2022-05-09 PROCEDURE — 99284 EMERGENCY DEPT VISIT MOD MDM: CPT

## 2022-05-09 PROCEDURE — 82550 ASSAY OF CK (CPK): CPT | Performed by: NURSE PRACTITIONER

## 2022-05-09 PROCEDURE — 99291 CRITICAL CARE FIRST HOUR: CPT | Performed by: INTERNAL MEDICINE

## 2022-05-09 PROCEDURE — 71045 X-RAY EXAM CHEST 1 VIEW: CPT

## 2022-05-09 PROCEDURE — 25010000002 DIPHENHYDRAMINE PER 50 MG: Performed by: NURSE PRACTITIONER

## 2022-05-09 PROCEDURE — 83930 ASSAY OF BLOOD OSMOLALITY: CPT | Performed by: INTERNAL MEDICINE

## 2022-05-09 PROCEDURE — 25010000002 ONDANSETRON PER 1 MG: Performed by: EMERGENCY MEDICINE

## 2022-05-09 PROCEDURE — 80053 COMPREHEN METABOLIC PANEL: CPT | Performed by: EMERGENCY MEDICINE

## 2022-05-09 PROCEDURE — 87637 SARSCOV2&INF A&B&RSV AMP PRB: CPT | Performed by: INTERNAL MEDICINE

## 2022-05-09 PROCEDURE — 74177 CT ABD & PELVIS W/CONTRAST: CPT

## 2022-05-09 PROCEDURE — 82805 BLOOD GASES W/O2 SATURATION: CPT

## 2022-05-09 PROCEDURE — 84100 ASSAY OF PHOSPHORUS: CPT | Performed by: INTERNAL MEDICINE

## 2022-05-09 PROCEDURE — 87040 BLOOD CULTURE FOR BACTERIA: CPT | Performed by: NURSE PRACTITIONER

## 2022-05-09 PROCEDURE — 84484 ASSAY OF TROPONIN QUANT: CPT | Performed by: EMERGENCY MEDICINE

## 2022-05-09 PROCEDURE — 36415 COLL VENOUS BLD VENIPUNCTURE: CPT

## 2022-05-09 PROCEDURE — 82330 ASSAY OF CALCIUM: CPT | Performed by: INTERNAL MEDICINE

## 2022-05-09 PROCEDURE — 99024 POSTOP FOLLOW-UP VISIT: CPT | Performed by: PHYSICIAN ASSISTANT

## 2022-05-09 PROCEDURE — 80053 COMPREHEN METABOLIC PANEL: CPT | Performed by: INTERNAL MEDICINE

## 2022-05-09 PROCEDURE — 83036 HEMOGLOBIN GLYCOSYLATED A1C: CPT | Performed by: INTERNAL MEDICINE

## 2022-05-09 PROCEDURE — 85025 COMPLETE CBC W/AUTO DIFF WBC: CPT | Performed by: EMERGENCY MEDICINE

## 2022-05-09 PROCEDURE — 85025 COMPLETE CBC W/AUTO DIFF WBC: CPT | Performed by: INTERNAL MEDICINE

## 2022-05-09 PROCEDURE — 63710000001 INSULIN REGULAR HUMAN PER 5 UNITS: Performed by: NURSE PRACTITIONER

## 2022-05-09 RX ORDER — MAGNESIUM SULFATE HEPTAHYDRATE 40 MG/ML
4 INJECTION, SOLUTION INTRAVENOUS AS NEEDED
Status: DISCONTINUED | OUTPATIENT
Start: 2022-05-09 | End: 2022-05-10

## 2022-05-09 RX ORDER — DEXTROSE, SODIUM CHLORIDE, AND POTASSIUM CHLORIDE 5; .45; .3 G/100ML; G/100ML; G/100ML
150 INJECTION INTRAVENOUS CONTINUOUS PRN
Status: DISCONTINUED | OUTPATIENT
Start: 2022-05-09 | End: 2022-05-10

## 2022-05-09 RX ORDER — NICOTINE POLACRILEX 4 MG
15 LOZENGE BUCCAL
Status: DISCONTINUED | OUTPATIENT
Start: 2022-05-09 | End: 2022-05-12

## 2022-05-09 RX ORDER — ONDANSETRON 2 MG/ML
4 INJECTION INTRAMUSCULAR; INTRAVENOUS ONCE
Status: COMPLETED | OUTPATIENT
Start: 2022-05-09 | End: 2022-05-09

## 2022-05-09 RX ORDER — ONDANSETRON 2 MG/ML
4 INJECTION INTRAMUSCULAR; INTRAVENOUS EVERY 6 HOURS PRN
Status: DISCONTINUED | OUTPATIENT
Start: 2022-05-09 | End: 2022-05-12 | Stop reason: HOSPADM

## 2022-05-09 RX ORDER — FENTANYL/ROPIVACAINE/NS/PF 2-625MCG/1
15 PLASTIC BAG, INJECTION (ML) EPIDURAL AS NEEDED
Status: DISCONTINUED | OUTPATIENT
Start: 2022-05-09 | End: 2022-05-10

## 2022-05-09 RX ORDER — OXYCODONE HYDROCHLORIDE 5 MG/1
5 TABLET ORAL EVERY 4 HOURS PRN
Status: DISCONTINUED | OUTPATIENT
Start: 2022-05-09 | End: 2022-05-12 | Stop reason: HOSPADM

## 2022-05-09 RX ORDER — DEXTROSE MONOHYDRATE 25 G/50ML
10-50 INJECTION, SOLUTION INTRAVENOUS
Status: DISCONTINUED | OUTPATIENT
Start: 2022-05-09 | End: 2022-05-12

## 2022-05-09 RX ORDER — DEXTROSE AND SODIUM CHLORIDE 5; .9 G/100ML; G/100ML
150 INJECTION, SOLUTION INTRAVENOUS CONTINUOUS PRN
Status: DISCONTINUED | OUTPATIENT
Start: 2022-05-09 | End: 2022-05-10

## 2022-05-09 RX ORDER — MAGNESIUM SULFATE HEPTAHYDRATE 40 MG/ML
2 INJECTION, SOLUTION INTRAVENOUS AS NEEDED
Status: DISCONTINUED | OUTPATIENT
Start: 2022-05-09 | End: 2022-05-10

## 2022-05-09 RX ORDER — DIPHENHYDRAMINE HYDROCHLORIDE 50 MG/ML
50 INJECTION INTRAMUSCULAR; INTRAVENOUS ONCE
Status: COMPLETED | OUTPATIENT
Start: 2022-05-09 | End: 2022-05-09

## 2022-05-09 RX ORDER — SODIUM CHLORIDE 0.9 % (FLUSH) 0.9 %
10 SYRINGE (ML) INJECTION AS NEEDED
Status: DISCONTINUED | OUTPATIENT
Start: 2022-05-09 | End: 2022-05-12

## 2022-05-09 RX ORDER — POTASSIUM CHLORIDE, DEXTROSE MONOHYDRATE AND SODIUM CHLORIDE 300; 5; 900 MG/100ML; G/100ML; MG/100ML
150 INJECTION, SOLUTION INTRAVENOUS CONTINUOUS PRN
Status: DISCONTINUED | OUTPATIENT
Start: 2022-05-09 | End: 2022-05-10

## 2022-05-09 RX ORDER — SODIUM CHLORIDE 0.9 % (FLUSH) 0.9 %
10 SYRINGE (ML) INJECTION EVERY 12 HOURS SCHEDULED
Status: DISCONTINUED | OUTPATIENT
Start: 2022-05-09 | End: 2022-05-12

## 2022-05-09 RX ORDER — SODIUM CHLORIDE 450 MG/100ML
250 INJECTION, SOLUTION INTRAVENOUS CONTINUOUS PRN
Status: DISCONTINUED | OUTPATIENT
Start: 2022-05-09 | End: 2022-05-10

## 2022-05-09 RX ORDER — SODIUM CHLORIDE AND POTASSIUM CHLORIDE 150; 450 MG/100ML; MG/100ML
250 INJECTION, SOLUTION INTRAVENOUS CONTINUOUS PRN
Status: DISCONTINUED | OUTPATIENT
Start: 2022-05-09 | End: 2022-05-10

## 2022-05-09 RX ORDER — DEXTROSE, SODIUM CHLORIDE, AND POTASSIUM CHLORIDE 5; .45; .15 G/100ML; G/100ML; G/100ML
150 INJECTION INTRAVENOUS CONTINUOUS PRN
Status: DISCONTINUED | OUTPATIENT
Start: 2022-05-09 | End: 2022-05-10

## 2022-05-09 RX ORDER — SODIUM CHLORIDE 0.9 % (FLUSH) 0.9 %
10 SYRINGE (ML) INJECTION EVERY 12 HOURS SCHEDULED
Status: DISCONTINUED | OUTPATIENT
Start: 2022-05-09 | End: 2022-05-10 | Stop reason: SDUPTHER

## 2022-05-09 RX ORDER — SODIUM CHLORIDE 9 MG/ML
250 INJECTION, SOLUTION INTRAVENOUS CONTINUOUS PRN
Status: DISCONTINUED | OUTPATIENT
Start: 2022-05-09 | End: 2022-05-10

## 2022-05-09 RX ORDER — SODIUM CHLORIDE 0.9 % (FLUSH) 0.9 %
10 SYRINGE (ML) INJECTION AS NEEDED
Status: DISCONTINUED | OUTPATIENT
Start: 2022-05-09 | End: 2022-05-10 | Stop reason: SDUPTHER

## 2022-05-09 RX ORDER — ENOXAPARIN SODIUM 100 MG/ML
40 INJECTION SUBCUTANEOUS EVERY 24 HOURS
Status: DISCONTINUED | OUTPATIENT
Start: 2022-05-10 | End: 2022-05-12 | Stop reason: HOSPADM

## 2022-05-09 RX ORDER — HYDROMORPHONE HYDROCHLORIDE 1 MG/ML
0.5 INJECTION, SOLUTION INTRAMUSCULAR; INTRAVENOUS; SUBCUTANEOUS ONCE
Status: COMPLETED | OUTPATIENT
Start: 2022-05-09 | End: 2022-05-09

## 2022-05-09 RX ORDER — SODIUM CHLORIDE 0.9 % (FLUSH) 0.9 %
10 SYRINGE (ML) INJECTION AS NEEDED
Status: DISCONTINUED | OUTPATIENT
Start: 2022-05-09 | End: 2022-05-10

## 2022-05-09 RX ORDER — SODIUM CHLORIDE AND POTASSIUM CHLORIDE 150; 900 MG/100ML; MG/100ML
250 INJECTION, SOLUTION INTRAVENOUS CONTINUOUS PRN
Status: DISCONTINUED | OUTPATIENT
Start: 2022-05-09 | End: 2022-05-10

## 2022-05-09 RX ORDER — DEXTROSE AND SODIUM CHLORIDE 5; .45 G/100ML; G/100ML
150 INJECTION, SOLUTION INTRAVENOUS CONTINUOUS PRN
Status: DISCONTINUED | OUTPATIENT
Start: 2022-05-09 | End: 2022-05-10

## 2022-05-09 RX ORDER — DEXTROSE, SODIUM CHLORIDE, AND POTASSIUM CHLORIDE 5; .9; .15 G/100ML; G/100ML; G/100ML
150 INJECTION INTRAVENOUS CONTINUOUS PRN
Status: DISCONTINUED | OUTPATIENT
Start: 2022-05-09 | End: 2022-05-10

## 2022-05-09 RX ORDER — POTASSIUM CHLORIDE 7.45 MG/ML
10 INJECTION INTRAVENOUS
Status: DISCONTINUED | OUTPATIENT
Start: 2022-05-09 | End: 2022-05-10

## 2022-05-09 RX ORDER — SODIUM CHLORIDE AND POTASSIUM CHLORIDE 300; 900 MG/100ML; MG/100ML
250 INJECTION, SOLUTION INTRAVENOUS CONTINUOUS PRN
Status: DISCONTINUED | OUTPATIENT
Start: 2022-05-09 | End: 2022-05-10

## 2022-05-09 RX ADMIN — ONDANSETRON 4 MG: 2 INJECTION INTRAMUSCULAR; INTRAVENOUS at 22:52

## 2022-05-09 RX ADMIN — IOPAMIDOL 90 ML: 755 INJECTION, SOLUTION INTRAVENOUS at 14:55

## 2022-05-09 RX ADMIN — POTASSIUM CHLORIDE AND SODIUM CHLORIDE 250 ML/HR: 450; 150 INJECTION, SOLUTION INTRAVENOUS at 21:18

## 2022-05-09 RX ADMIN — POTASSIUM CHLORIDE AND SODIUM CHLORIDE 250 ML/HR: 450; 150 INJECTION, SOLUTION INTRAVENOUS at 21:37

## 2022-05-09 RX ADMIN — SODIUM CHLORIDE 2000 ML: 9 INJECTION, SOLUTION INTRAVENOUS at 11:29

## 2022-05-09 RX ADMIN — INSULIN HUMAN 1.8 UNITS/HR: 1 INJECTION, SOLUTION INTRAVENOUS at 18:26

## 2022-05-09 RX ADMIN — ONDANSETRON 4 MG: 2 INJECTION INTRAMUSCULAR; INTRAVENOUS at 16:42

## 2022-05-09 RX ADMIN — OXYCODONE 5 MG: 5 TABLET ORAL at 22:52

## 2022-05-09 RX ADMIN — DIPHENHYDRAMINE HYDROCHLORIDE 50 MG: 50 INJECTION, SOLUTION INTRAMUSCULAR; INTRAVENOUS at 13:44

## 2022-05-09 RX ADMIN — HYDROMORPHONE HYDROCHLORIDE 0.5 MG: 1 INJECTION, SOLUTION INTRAMUSCULAR; INTRAVENOUS; SUBCUTANEOUS at 12:19

## 2022-05-09 RX ADMIN — SODIUM CHLORIDE 1000 ML/HR: 9 INJECTION, SOLUTION INTRAVENOUS at 18:27

## 2022-05-09 RX ADMIN — INSULIN HUMAN 8 UNITS: 100 INJECTION, SOLUTION PARENTERAL at 15:49

## 2022-05-09 RX ADMIN — ONDANSETRON 4 MG: 2 INJECTION INTRAMUSCULAR; INTRAVENOUS at 12:19

## 2022-05-09 RX ADMIN — POTASSIUM CHLORIDE, DEXTROSE MONOHYDRATE AND SODIUM CHLORIDE 150 ML/HR: 150; 5; 450 INJECTION, SOLUTION INTRAVENOUS at 21:56

## 2022-05-09 RX ADMIN — POTASSIUM PHOSPHATE, MONOBASIC POTASSIUM PHOSPHATE, DIBASIC 45 MMOL: 224; 236 INJECTION, SOLUTION, CONCENTRATE INTRAVENOUS at 20:43

## 2022-05-09 NOTE — H&P
"  CRITICAL CARE ADMISSION NOTE    Chief Complaint     Diabetic ketoacidosis without coma associated with type 1 diabetes mellitus (HCC)    History of Present Illness     36-year-old type I diabetic admitted to the intensive care unit on 5/9/2022 with diabetic ketoacidosis    The patient has type 1 diabetes mellitus.  She underwent a lap sleeve gastrectomy and cholecystectomy by Dr. Finnegan on 4/28/2022.  Her hospital course was complicated by elevated LFTs.  She underwent an MRCP that did not show bile duct defects or distention.  She subsequently improved and was discharged home on 5/2/2022.    The patient has done poorly since discharge.  She has not been able to take in much nutritional supplementation or fluids.  She has had ongoing nausea, vomiting, and abdominal pain.  The narcotic pain medicine prescribed for her was poorly tolerated.  She did not \"like the way it made her feel\".    She has been using \"as needed\" insulin since discharge and for the 2 days prior to her admission she took and no insulin at all.  Finally today she used 10 units of fast acting insulin this morning.  She was evaluated in Dr. Finnegan's office this morning and told to go to the emergency room where she was found to have a severe metabolic acidosis and a clinical picture consistent with DKA.    Patient has not experienced DKA in the past.  She is a non-smoker and has had no recent alcohol intake    Problem List, Surgical History, Family, Social History, and ROS     Patient Active Problem List   Diagnosis   • Left lower quadrant pain   • Constipation   • Bright red blood per rectum   • Nausea   • Dysphagia   • Heartburn   • Diarrhea   • Colon cancer screening   • HLD (hyperlipidemia)   • Dyspepsia   • Diverticulosis   • Wheezing   • Migraine   • Diabetes mellitus (HCC)   • Anxiety   • Depression   • Hypercholesteremia   • Murmur, cardiac   • Metabolic syndrome   • Pre-operative cardiovascular examination   • Morbid exogenous obesity (S/P " Lap Sleeve Gastrec jaylyn and Lap Choly 2022)   • Diabetic ketoacidosis without coma associated with type 1 diabetes mellitus (HCC)   • UTI (urinary tract infection)   • Proteinuria     Past Surgical History:   Procedure Laterality Date   •  SECTION     •  SECTION     •  SECTION     • CHOLECYSTECTOMY N/A 2022    Procedure: CHOLECYSTECTOMY LAPAROSCOPIC;  Surgeon: Yunior Finnegan MD;  Location:  [facility] OR;  Service: General;  Laterality: N/A;   • COLONOSCOPY N/A 04/15/2019    Procedure: COLONOSCOPY W/ COLD FORCEP BIOPSIES; COLD SNARE POLYPECTOMY; COLD FORCEP POLYPECTOMY; RESOLUTION CLIP X1;  Surgeon: Adán Pena MD;  Location: [facility] ENDOSCOPY;  Service: Gastroenterology   • D & C HYSTEROSCOPY ENDOMETRIAL ABLATION     • ENDOSCOPY N/A 2019    Procedure: ESOPHAGOGASTRODUODENOSCOPY with cold biopsies and dilation;  Surgeon: Adán Pena MD;  Location: [facility] ENDOSCOPY;  Service: Gastroenterology   • ENDOSCOPY N/A 2022    Procedure: ESOPHAGOGASTRODUODENOSCOPY;  Surgeon: Yunior Finnegan MD;  Location:  [facility] OR;  Service: Bariatric;  Laterality: N/A;   • GASTRIC SLEEVE LAPAROSCOPIC N/A 2022    Procedure: GASTRIC SLEEVE LAPAROSCOPIC;  Surgeon: Yunior Finnegan MD;  Location:  [facility] OR;  Service: Bariatric;  Laterality: N/A;   • TEETH EXTRACTION      dentures on upper plate   • TOTAL ABDOMINAL HYSTERECTOMY  2019    low transverse.  [facility] - lap converted to open for scar tissue   • TUBAL ABDOMINAL LIGATION         Allergies   Allergen Reactions   • Morphine Itching   • Adhesive Tape Dermatitis     Luisa. Tele electrodes   • Hydrocodone Itching and Rash   • Jardiance [Empagliflozin] Itching and Rash   • Latex Rash   • Trulicity [Dulaglutide] Nausea Only     No current facility-administered medications on file prior to encounter.     Current Outpatient Medications on File Prior to Encounter   Medication Sig   • Continuous Blood Gluc  Sensor (Dexcom G6 Sensor) USE TO CHECK BLOOD SUGAR AND CHANGE EVERY 10 DAYS   • cyclobenzaprine (FLEXERIL) 10 MG tablet Take 1 tablet by mouth 3 (Three) Times a Day As Needed for Muscle Spasms.   • insulin detemir (LEVEMIR) 100 UNIT/ML injection Inject 8 Units under the skin into the appropriate area as directed Every Night.   • Insulin Lispro, 1 Unit Dial, (HUMALOG) 100 UNIT/ML solution pen-injector Inject 4 Units under the skin into the appropriate area as directed 3 (Three) Times a Day With Meals.   • Insulin Lispro (humaLOG) 100 UNIT/ML injection Inject 0-12 Units under the skin into the appropriate area as directed 4 (Four) Times a Day Before Meals & at Bedtime. Correction - Moderate-High Dose.  60-80 units/day total insulin dose or uses insulin at home  Blood glucose 150-199 mg/dL - 3 units  Blood glucose 200-249 mg/dL - 5 units  Blood glucose 250-299 mg/dL - 8 units  Blood glucose 300-349 mg/dL - 10 units  Blood glucose 350-400 mg/dL - 12 units   • Insulin Pen Needle (B-D UF III MINI PEN NEEDLES) 31G X 5 MM misc Use as directed four times daily.   • omeprazole (priLOSEC) 40 MG capsule Take 1 capsule by mouth Daily for 60 days.   • ondansetron (ZOFRAN) 4 MG tablet Take 4 mg by mouth Every 8 (Eight) Hours As Needed for Nausea.   • ondansetron (Zofran) 4 MG tablet Take 1 tablet by mouth Every 4 (Four) Hours As Needed for Nausea.   • oxyCODONE (Roxicodone) 5 MG immediate release tablet Take 1 tablet by mouth Every 4 (Four) Hours As Needed for Moderate Pain .   • promethazine (PHENERGAN) 12.5 MG tablet Take 1 tablet by mouth Every 4 (Four) Hours As Needed for Nausea.     MEDICATION LIST AND ALLERGIES REVIEWED.    Family History   Problem Relation Age of Onset   • Colon cancer Paternal Uncle    • Colon cancer Maternal Grandmother    • Crohn's disease Maternal Grandmother    • Diabetes Maternal Grandmother    • Colon cancer Paternal Grandmother    • Other Paternal Grandmother         Colitis   • Diabetes Paternal  "Grandmother    • Hyperlipidemia Paternal Grandmother    • Hypertension Paternal Grandmother    • Sleep apnea Paternal Grandmother    • Colon cancer Paternal Grandfather    • Diabetes Paternal Grandfather    • Hypertension Paternal Grandfather    • Hypertension Mother    • Cancer Mother    • Diabetes Maternal Grandfather    • Hypertension Maternal Grandfather    • Stroke Maternal Grandfather    • No Known Problems Brother    • No Known Problems Son    • No Known Problems Son    • No Known Problems Daughter    • No Known Problems Daughter      Social History     Tobacco Use   • Smoking status: Former Smoker     Packs/day: 0.25     Years: 9.00     Pack years: 2.25     Types: Cigarettes     Quit date:      Years since quittin.3   • Smokeless tobacco: Never Used   Vaping Use   • Vaping Use: Never used   Substance Use Topics   • Alcohol use: Not Currently     Comment: social   • Drug use: No     Social History     Social History Narrative    Lives in [facility], close to [place].   with four children.  Works as a groomer.       FAMILY AND SOCIAL HISTORY REVIEWED.    Review of Systems  AS ABOVE OR ALL OTHER SYSTEMS REVIEWED AND ARE NEGATIVE.    Physical Exam and Clinical Information   /74   Pulse 109   Temp 97.7 °F (36.5 °C) (Oral)   Resp 18   Ht 154.9 cm (61\")   Wt 78 kg (172 lb)   LMP  (LMP Unknown)   SpO2 97%   BMI 32.50 kg/m²   Physical Exam:   GENERAL: Awake, no distress.  The smell of ketones are present   HEENT: No adenopathy or thyromegaly   LUNGS: Clear without wheezes or rhonchi   HEART: Regular tachycardia without murmurs   GI: Laparoscopy incision sites appear to be healing well with no erythema or drainage.  Abdomen is mildly diffusely tender but bowel sounds are present in the left lower quadrant   EXTREMITIES: No clubbing, cyanosis, or edema   NEURO/PSYCH: Awake, alert, appropriate    Results from last 7 days   Lab Units 22  1103   WBC 10*3/mm3 11.58*   HEMOGLOBIN g/dL " 16.3*   PLATELETS 10*3/mm3 426     Results from last 7 days   Lab Units 05/09/22  1103   SODIUM mmol/L 129*   POTASSIUM mmol/L 3.7   CO2 mmol/L 6.0*   BUN mg/dL 13   CREATININE mg/dL 0.89   MAGNESIUM mg/dL 2.0   GLUCOSE mg/dL 379*     Estimated Creatinine Clearance: 81 mL/min (by C-G formula based on SCr of 0.89 mg/dL).      Results from last 7 days   Lab Units 05/09/22  1639   PH, ARTERIAL pH units 7.164*   PCO2, ARTERIAL mm Hg 21.3*   PO2 ART mm Hg 106.0     Lab Results   Component Value Date    LACTATE 1.4 05/09/2022        IMAGES: CT scan of the abdomen pelvis reveals no intra-abdominal pathology    I reviewed the patient's results and images.     Assesment     Active Hospital Problems    Diagnosis    • **Diabetic ketoacidosis without coma associated with type 1 diabetes mellitus (HCC)    • UTI (urinary tract infection)    • Proteinuria    • Morbid exogenous obesity (S/P Lap Sleeve Gastrec jaylyn and Lap Choly 4/28/2022)      Plan/Recommendations     DKA probably due to lack of insulin intake and type I diabetic.  She may have a concomitant urinary tract infection although UA is somewhat equivocal.    Plan  Admit to the intensive care unit  DKA protocol  Empiric antibiotics for UTI  Follow-up urine culture  Serial abdominal exam    Critical Care time spent in direct patient care: 32 minutes (excluding procedure time, if applicable) including high complexity decision making to assess, manipulate, and support vital organ system failure in this individual who has impairment of one or more vital organ systems such that there is a high probability of imminent or life threatening deterioration in the patient’s condition.    STEFANY Willoughby MD  Pulmonary and Critical Care Medicine     CC: Yolanda Tian APRN

## 2022-05-09 NOTE — ED PROVIDER NOTES
EMERGENCY DEPARTMENT ENCOUNTER    Pt Name: Rain Barreto  MRN: [id number]  Pt :   1985  Room Number:    Date of encounter:  2022  PCP: Yolanda Tian APRN  ED Provider: SATHISH Pretty    Historian: patient and        HPI:  Chief Complaint: abdominal pain        Context: Rain Barreto is a 36 y.o. female who presents to the ED complaining of abdominal pain that has worsened in the last 72 hours.  Patient is approximately Rangel days after gastric sleeve surgery on .  She had a hospitalization that was complicated by a hematoma around her incision.  She has had elevated liver enzymes and hyper and hypoglycemia.    Her pain has increased and has been unresponsive to her routine use of hydrocodone in the last 48 hours.  She began vomiting last night without any hematemesis.  She experiencing profound nausea and dizziness with standing.  She is experiencing palpitations and racing heart rate.  Her abdominal pain is 10 out of 10.    Review of systems is negative for for fever but positive for chills.  Positive for palpitations without cough or shortness of breath or chest pain.  Positive for nausea and vomiting without diarrhea.  Positive for epigastric abdominal pain.  Positive for profound weakness, dizziness without syncope.  No neurosensory complaints or focal weakness.  Negative for rash      PAST MEDICAL HISTORY  Past Medical History:   Diagnosis Date   • Anxiety    • Depression    • Diabetes mellitus (HCC)     Type I, has detachable pump. dx in 's   • Diverticulosis    • Dyspepsia    • Gallstones    • Heartburn     takes prn zantac and tums, EGD  [facility], EGD Dr. Finnegan 19   • HLD (hyperlipidemia)    • Hypertension     denies   • Kidney infection    • Kidney stone    • Migraine    • MRSA carrier     PAT (+) 22   •  (normal spontaneous vaginal delivery)     1st child w/ preeclampsia   • PONV (postoperative nausea and vomiting)    • Tattoos   "  • Wears dentures     upper   • Wheezing     \"seasonal allergies\" uses inhalers prn         PAST SURGICAL HISTORY  Past Surgical History:   Procedure Laterality Date   •  SECTION     •  SECTION     •  SECTION     • CHOLECYSTECTOMY N/A 2022    Procedure: CHOLECYSTECTOMY LAPAROSCOPIC;  Surgeon: Yunior Finnegan MD;  Location:  [facility];  Service: General;  Laterality: N/A;   • COLONOSCOPY N/A 04/15/2019    Procedure: COLONOSCOPY W/ COLD FORCEP BIOPSIES; COLD SNARE POLYPECTOMY; COLD FORCEP POLYPECTOMY; RESOLUTION CLIP X1;  Surgeon: Adán Pena MD;  Location: [facility] ENDOSCOPY;  Service: Gastroenterology   • D & C HYSTEROSCOPY ENDOMETRIAL ABLATION     • ENDOSCOPY N/A 2019    Procedure: ESOPHAGOGASTRODUODENOSCOPY with cold biopsies and dilation;  Surgeon: Adán Pena MD;  Location: [facility] ENDOSCOPY;  Service: Gastroenterology   • ENDOSCOPY N/A 2022    Procedure: ESOPHAGOGASTRODUODENOSCOPY;  Surgeon: Yunior Finnegan MD;  Location:  [facility];  Service: Bariatric;  Laterality: N/A;   • GASTRIC SLEEVE LAPAROSCOPIC N/A 2022    Procedure: GASTRIC SLEEVE LAPAROSCOPIC;  Surgeon: Yunior Finnegan MD;  Location:  [facility];  Service: Bariatric;  Laterality: N/A;   • TEETH EXTRACTION      dentures on upper plate   • TOTAL ABDOMINAL HYSTERECTOMY      low transverse.  [facility] - lap converted to open for scar tissue   • TUBAL ABDOMINAL LIGATION           FAMILY HISTORY  Family History   Problem Relation Age of Onset   • Colon cancer Paternal Uncle    • Colon cancer Maternal Grandmother    • Crohn's disease Maternal Grandmother    • Diabetes Maternal Grandmother    • Colon cancer Paternal Grandmother    • Other Paternal Grandmother         Colitis   • Diabetes Paternal Grandmother    • Hyperlipidemia Paternal Grandmother    • Hypertension Paternal Grandmother    • Sleep apnea Paternal Grandmother    • Colon cancer Paternal Grandfather    • " Diabetes Paternal Grandfather    • Hypertension Paternal Grandfather    • Hypertension Mother    • Cancer Mother    • Diabetes Maternal Grandfather    • Hypertension Maternal Grandfather    • Stroke Maternal Grandfather    • No Known Problems Brother    • No Known Problems Son    • No Known Problems Son    • No Known Problems Daughter    • No Known Problems Daughter          SOCIAL HISTORY  Social History     Socioeconomic History   • Marital status:    Tobacco Use   • Smoking status: Former Smoker     Packs/day: 0.25     Years: 9.00     Pack years: 2.25     Types: Cigarettes     Quit date:      Years since quittin.3   • Smokeless tobacco: Never Used   Vaping Use   • Vaping Use: Never used   Substance and Sexual Activity   • Alcohol use: Not Currently     Comment: social   • Drug use: No   • Sexual activity: Defer         ALLERGIES  Morphine, Adhesive tape, Hydrocodone, Jardiance [empagliflozin], Latex, and Trulicity [dulaglutide]        REVIEW OF SYSTEMS  Review of Systems     All systems reviewed and negative except for those discussed in HPI.       PHYSICAL EXAM    I have reviewed the triage vital signs and nursing notes.    ED Triage Vitals [22 1016]   Temp Heart Rate Resp BP SpO2   97.7 °F (36.5 °C) (!) 131 24 149/67 99 %      Temp src Heart Rate Source Patient Position BP Location FiO2 (%)   Oral Monitor Sitting Right arm --       Physical Exam  GENERAL:   Appears ill-appearing.  She is tachycardic.  She is a very good historian.  She is tachypneic.  HENT: Nares patent.  EYES: No scleral icterus.  CV: Regular rhythm, tachycardic rate 120.  No murmur.  No peripheral edema  RESPIRATORY: Normal effort.  No audible wheezes, rales or rhonchi.  She is slightly tachypneic.  ABDOMEN: Soft, diffuse moderate abdominal pain.  Positive for guarding.  No CVA tenderness.  Her incisions are well approximated without obvious cellulitis or seroma or abscess.  MUSCULOSKELETAL: No deformities.   NEURO:  Alert, moves all extremities, follows commands.  No photophobia or meningeal signs.  No neurosensory deficits or focal weakness.  SKIN: Warm, dry, no rash visualized.        LAB RESULTS  Recent Results (from the past 24 hour(s))   Comprehensive Metabolic Panel    Collection Time: 05/09/22 11:03 AM    Specimen: Blood   Result Value Ref Range    Glucose 379 (H) 65 - 99 mg/dL    BUN 13 6 - 20 mg/dL    Creatinine 0.89 0.57 - 1.00 mg/dL    Sodium 129 (L) 136 - 145 mmol/L    Potassium 3.7 3.5 - 5.2 mmol/L    Chloride 96 (L) 98 - 107 mmol/L    CO2 6.0 (L) 22.0 - 29.0 mmol/L    Calcium 9.3 8.6 - 10.5 mg/dL    Total Protein 8.6 (H) 6.0 - 8.5 g/dL    Albumin 4.30 3.50 - 5.20 g/dL    ALT (SGPT) 27 1 - 33 U/L    AST (SGOT) 9 1 - 32 U/L    Alkaline Phosphatase 199 (H) 39 - 117 U/L    Total Bilirubin 0.3 0.0 - 1.2 mg/dL    Globulin 4.3 gm/dL    A/G Ratio 1.0 g/dL    BUN/Creatinine Ratio 14.6 7.0 - 25.0    Anion Gap 27.0 (H) 5.0 - 15.0 mmol/L    eGFR 85.2 >60.0 mL/min/1.73   Lipase    Collection Time: 05/09/22 11:03 AM    Specimen: Blood   Result Value Ref Range    Lipase 119 (H) 13 - 60 U/L   Troponin    Collection Time: 05/09/22 11:03 AM    Specimen: Blood   Result Value Ref Range    Troponin T <0.010 0.000 - 0.030 ng/mL   Green Top (Gel)    Collection Time: 05/09/22 11:03 AM   Result Value Ref Range    Extra Tube Hold for add-ons.    Lavender Top    Collection Time: 05/09/22 11:03 AM   Result Value Ref Range    Extra Tube hold for add-on    Gold Top - SST    Collection Time: 05/09/22 11:03 AM   Result Value Ref Range    Extra Tube Hold for add-ons.    Gray Top    Collection Time: 05/09/22 11:03 AM   Result Value Ref Range    Extra Tube Hold for add-ons.    Light Blue Top    Collection Time: 05/09/22 11:03 AM   Result Value Ref Range    Extra Tube hold for add-on    CBC Auto Differential    Collection Time: 05/09/22 11:03 AM    Specimen: Blood   Result Value Ref Range    WBC 11.58 (H) 3.40 - 10.80 10*3/mm3    RBC 5.54 (H) 3.77 -  5.28 10*6/mm3    Hemoglobin 16.3 (H) 12.0 - 15.9 g/dL    Hematocrit 47.2 (H) 34.0 - 46.6 %    MCV 85.2 79.0 - 97.0 fL    MCH 29.4 26.6 - 33.0 pg    MCHC 34.5 31.5 - 35.7 g/dL    RDW 13.0 12.3 - 15.4 %    RDW-SD 39.3 37.0 - 54.0 fl    MPV 9.9 6.0 - 12.0 fL    Platelets 426 140 - 450 10*3/mm3    Neutrophil % 85.5 (H) 42.7 - 76.0 %    Lymphocyte % 7.9 (L) 19.6 - 45.3 %    Monocyte % 5.4 5.0 - 12.0 %    Eosinophil % 0.2 (L) 0.3 - 6.2 %    Basophil % 0.5 0.0 - 1.5 %    Immature Grans % 0.5 0.0 - 0.5 %    Neutrophils, Absolute 9.89 (H) 1.70 - 7.00 10*3/mm3    Lymphocytes, Absolute 0.92 0.70 - 3.10 10*3/mm3    Monocytes, Absolute 0.63 0.10 - 0.90 10*3/mm3    Eosinophils, Absolute 0.02 0.00 - 0.40 10*3/mm3    Basophils, Absolute 0.06 0.00 - 0.20 10*3/mm3    Immature Grans, Absolute 0.06 (H) 0.00 - 0.05 10*3/mm3    nRBC 0.0 0.0 - 0.2 /100 WBC   Beta Hydroxybutyrate Quantitative    Collection Time: 05/09/22 11:03 AM    Specimen: Blood   Result Value Ref Range    Beta-Hydroxybutyrate Quant 6.715 (H) 0.020 - 0.270 mmol/L   Lactic Acid, Plasma    Collection Time: 05/09/22 11:03 AM    Specimen: Blood   Result Value Ref Range    Lactate 1.4 0.5 - 2.0 mmol/L   CK    Collection Time: 05/09/22 11:03 AM    Specimen: Blood   Result Value Ref Range    Creatine Kinase 34 20 - 180 U/L   Magnesium    Collection Time: 05/09/22 11:03 AM    Specimen: Blood   Result Value Ref Range    Magnesium 2.0 1.6 - 2.6 mg/dL   BNP    Collection Time: 05/09/22 11:03 AM    Specimen: Blood   Result Value Ref Range    proBNP 55.9 0.0 - 450.0 pg/mL   Urinalysis With Microscopic If Indicated (No Culture) - Urine, Clean Catch    Collection Time: 05/09/22 11:15 AM    Specimen: Urine, Clean Catch   Result Value Ref Range    Color, UA Yellow Yellow, Straw    Appearance, UA Clear Clear    pH, UA <=5.0 5.0 - 8.0    Specific Gravity, UA 1.029 1.001 - 1.030    Glucose, UA >=1000 mg/dL (3+) (A) Negative    Ketones, UA >=160 mg/dL (4+) (A) Negative    Bilirubin, UA  Negative Negative    Blood, UA Small (1+) (A) Negative    Protein, UA >=300 mg/dL (3+) (A) Negative    Leuk Esterase, UA Negative Negative    Nitrite, UA Negative Negative    Urobilinogen, UA 0.2 E.U./dL 0.2 - 1.0 E.U./dL   Urinalysis, Microscopic Only - Urine, Clean Catch    Collection Time: 05/09/22 11:15 AM    Specimen: Urine, Clean Catch   Result Value Ref Range    RBC, UA 3-6 (A) None Seen, 0-2 /HPF    WBC, UA 3-5 (A) None Seen, 0-2 /HPF    Bacteria, UA 1+ (A) None Seen, Trace /HPF    Squamous Epithelial Cells, UA 7-12 (A) None Seen, 0-2 /HPF    Hyaline Casts, UA 21-30 0 - 6 /LPF    WBC Casts 7-12 None Seen /LPF    Granular Casts, UA 7-12 None Seen /LPF    Mucus, UA Small/1+ (A) None Seen, Trace /HPF    Methodology Manual Light Microscopy    POC Glucose Once    Collection Time: 05/09/22  3:48 PM    Specimen: Blood   Result Value Ref Range    Glucose 274 (H) 70 - 130 mg/dL   Blood Gas, Arterial With Co-Ox    Collection Time: 05/09/22  4:39 PM    Specimen: Arterial Blood   Result Value Ref Range    Site Right Radial     Dat's Test N/A     pH, Arterial 7.164 (C) 7.350 - 7.450 pH units    pCO2, Arterial 21.3 (L) 35.0 - 45.0 mm Hg    pO2, Arterial 106.0 83.0 - 108.0 mm Hg    HCO3, Arterial 7.6 (L) 20.0 - 26.0 mmol/L    Base Excess, Arterial -19.0 (L) 0.0 - 2.0 mmol/L    Hemoglobin, Blood Gas 14.6 14 - 18 g/dL    Hematocrit, Blood Gas 44.6 38.0 - 51.0 %    Oxyhemoglobin 97.5 94 - 99 %    Methemoglobin 0.40 0.00 - 1.50 %    Carboxyhemoglobin 0.8 0 - 2 %    CO2 Content 8.3 (L) 22 - 33 mmol/L    Temperature 37.0 C    Barometric Pressure for Blood Gas      Modality Room Air     FIO2 21 %    Ventilator Mode       Note      Notified Brandi BOWER     Notified By [id number]     Notified Time 05/09/2022 16:41     pH, Temp Corrected 7.164 pH Units    pCO2, Temperature Corrected 21.3 (L) 35 - 45 mm Hg    pO2, Temperature Corrected 106 83 - 108 mm Hg   POC Glucose Once    Collection Time: 05/09/22  6:04 PM    Specimen: Blood    Result Value Ref Range    Glucose 242 (H) 70 - 130 mg/dL   Comprehensive Metabolic Panel    Collection Time: 05/09/22  6:35 PM    Specimen: Blood   Result Value Ref Range    Glucose 274 (H) 65 - 99 mg/dL    BUN 7 6 - 20 mg/dL    Creatinine 0.65 0.57 - 1.00 mg/dL    Sodium 135 (L) 136 - 145 mmol/L    Potassium 3.7 3.5 - 5.2 mmol/L    Chloride 107 98 - 107 mmol/L    CO2 8.0 (L) 22.0 - 29.0 mmol/L    Calcium 8.8 8.6 - 10.5 mg/dL    Total Protein 7.3 6.0 - 8.5 g/dL    Albumin 4.10 3.50 - 5.20 g/dL    ALT (SGPT) 21 1 - 33 U/L    AST (SGOT) 11 1 - 32 U/L    Alkaline Phosphatase 162 (H) 39 - 117 U/L    Total Bilirubin 0.3 0.0 - 1.2 mg/dL    Globulin 3.2 gm/dL    A/G Ratio 1.3 g/dL    BUN/Creatinine Ratio 10.8 7.0 - 25.0    Anion Gap 20.0 (H) 5.0 - 15.0 mmol/L    eGFR 115.7 >60.0 mL/min/1.73   Phosphorus    Collection Time: 05/09/22  6:35 PM    Specimen: Blood   Result Value Ref Range    Phosphorus 1.2 (C) 2.5 - 4.5 mg/dL   Osmolality, Serum    Collection Time: 05/09/22  6:35 PM    Specimen: Blood   Result Value Ref Range    Osmolality 296 (H) 275 - 295 mOsm/kg   Hemoglobin A1c    Collection Time: 05/09/22  6:35 PM    Specimen: Blood   Result Value Ref Range    Hemoglobin A1C 10.80 (H) 4.80 - 5.60 %   Magnesium    Collection Time: 05/09/22  6:35 PM    Specimen: Blood   Result Value Ref Range    Magnesium 1.9 1.6 - 2.6 mg/dL   Procalcitonin    Collection Time: 05/09/22  6:35 PM    Specimen: Blood   Result Value Ref Range    Procalcitonin 0.06 0.00 - 0.25 ng/mL   Calcium, Ionized    Collection Time: 05/09/22  6:35 PM    Specimen: Blood   Result Value Ref Range    Ionized Calcium 1.33 (H) 1.12 - 1.32 mmol/L   CBC Auto Differential    Collection Time: 05/09/22  6:35 PM    Specimen: Blood   Result Value Ref Range    WBC 10.28 3.40 - 10.80 10*3/mm3    RBC 4.86 3.77 - 5.28 10*6/mm3    Hemoglobin 14.4 12.0 - 15.9 g/dL    Hematocrit 42.3 34.0 - 46.6 %    MCV 87.0 79.0 - 97.0 fL    MCH 29.6 26.6 - 33.0 pg    MCHC 34.0 31.5 - 35.7  g/dL    RDW 12.9 12.3 - 15.4 %    RDW-SD 40.0 37.0 - 54.0 fl    MPV 10.0 6.0 - 12.0 fL    Platelets 375 140 - 450 10*3/mm3    Neutrophil % 83.4 (H) 42.7 - 76.0 %    Lymphocyte % 9.4 (L) 19.6 - 45.3 %    Monocyte % 5.8 5.0 - 12.0 %    Eosinophil % 0.3 0.3 - 6.2 %    Basophil % 0.6 0.0 - 1.5 %    Immature Grans % 0.5 0.0 - 0.5 %    Neutrophils, Absolute 8.57 (H) 1.70 - 7.00 10*3/mm3    Lymphocytes, Absolute 0.97 0.70 - 3.10 10*3/mm3    Monocytes, Absolute 0.60 0.10 - 0.90 10*3/mm3    Eosinophils, Absolute 0.03 0.00 - 0.40 10*3/mm3    Basophils, Absolute 0.06 0.00 - 0.20 10*3/mm3    Immature Grans, Absolute 0.05 0.00 - 0.05 10*3/mm3    nRBC 0.0 0.0 - 0.2 /100 WBC       If labs were ordered, I independently reviewed the results.        RADIOLOGY  XR Chest 1 View    Result Date: 5/9/2022  EXAM: XR CHEST 1 VW-  DATE OF EXAM: 5/9/2022 10:36 AM  INDICATION: Upper Abdominal Pain Triage Protocol.   COMPARISONS: None  FINDINGS:  No evidence of pneumonia, pulmonary edema or other acute pulmonary process. No evidence of pneumothorax or significant pleural effusion. No evidence of acute process of the heart or other mediastinal structures. Normal heart size.       Negative chest x-ray. No evidence of acute cardiopulmonary disease.  This report was finalized on 5/9/2022 10:59 AM by Scotty Vo.      CT Angiogram Chest, CT Abdomen Pelvis With Contrast    Result Date: 5/9/2022  DATE OF EXAM: 5/9/2022 2:42 PM  PROCEDURE: CT ABDOMEN PELVIS W CONTRAST-, CT ANGIOGRAM CHEST-  INDICATIONS: acute pain; post op gastric sleeve surgery  COMPARISON: No comparisons available.  TECHNIQUE: Contiguous axial imaging was obtained from the thoracic inlet through the upper abdomen following the intravenous administration of 95 mL of Isovue 370. Routine transaxial slices were obtained through the abdomen and pelvis after the intravenous administration of contrast. Reconstructed coronal and sagittal images were also obtained. Automated exposure control  and iterative construction methods were used. Three-dimensional rotational MIP reconstruction of the pulmonary arterial vasculature was created at independent workstation.  The radiation dose reduction device was turned on for each scan per the ALARA (As Low as Reasonably Achievable) protocol.  FINDINGS:  CTA chest: Normal appearance of the pulmonary arteries without filling defect to suggest pulmonary embolism. Normal caliber of the main pulmonary artery. Normal appearance of the thoracic aorta with incidental note of 2 vessel aortic arch, a normal variant. Unremarkable appearance of the cardiac chambers. Normal heart size. No pericardial effusion. No significant coronary artery calcifications. No bulky or suspicious mediastinal or hilar lymph nodes. Unremarkable appearance of the thoracic esophagus. Homogeneous attenuation of the thyroid gland. The chest wall soft tissues are normal. No axillary lymphadenopathy. The trachea and mainstem bronchi are patent. The smaller peripheral airways are normal. The lungs are clear without focal consolidation or evidence of active infectious or inflammatory process. No lung mass or suspicious pulmonary nodule. No pleural effusion or pneumothorax. No acute or suspicious bony findings in the thorax.  Abdomen and pelvis: Normal appearance of the liver. Expected postoperative appearance of recent cholecystectomy; small linear region of hypodensity in the gallbladder fossa may reflect a small amount of nonorganized postoperative fluid or edema. Normal appearance of the bile ducts. Unremarkable appearance of the spleen, pancreas, adrenal glands, kidneys, and bladder. The uterus is surgically absent. Unremarkable appearance of surgical change of sleeve gastrectomy; there is no conspicuous inflammatory change about the gastric sleeve. No evidence of intra-abdominal abscess or discrete/drainable intra-abdominal fluid collection. No free abdominopelvic fluid. No pneumoperitoneum.  Hyperdense material is seen throughout the unremarkable appearing colon, likely previously ingested oral contrast. There may be minimal sigmoid colonic diverticulosis without evidence of diverticulitis. There is no evidence of bowel obstruction. The appendix is normal. Normal appearance of the vasculature. No lymphadenopathy. Scattered regions of periumbilical and right and left anterior abdominal wall subcutaneous fat stranding likely reflect recent laparoscopic approach surgery. No acute osseous findings.      No evidence of pulmonary embolism. No acute intrathoracic findings.  No acute abdominopelvic findings. Expected postoperative appearance of recent laparoscopic sleeve gastrectomy and cholecystectomy.  This report was finalized on 5/9/2022 3:25 PM by Cuauhtemoc Godoy MD.        PROCEDURES    Critical Care  Performed by: Nancy Guerra APRN  Authorized by: Bryon Miranda MD     Critical care provider statement:     Critical care time (minutes):  35    Critical care time was exclusive of:  Separately billable procedures and treating other patients    Critical care was necessary to treat or prevent imminent or life-threatening deterioration of the following conditions:  Dehydration and endocrine crisis    Critical care was time spent personally by me on the following activities:  Development of treatment plan with patient or surrogate, discussions with consultants, discussions with primary provider, evaluation of patient's response to treatment, examination of patient, interpretation of cardiac output measurements, obtaining history from patient or surrogate, review of old charts, re-evaluation of patient's condition, pulse oximetry, ordering and review of radiographic studies and ordering and review of laboratory studies    I assumed direction of critical care for this patient from another provider in my specialty: no      Care discussed with: admitting provider          ECG 12 Lead   Preliminary Result              MEDICATIONS GIVEN IN ER    Medications   sodium chloride 0.9 % flush 10 mL (has no administration in time range)   sodium chloride 0.9 % flush 10 mL (has no administration in time range)   sodium chloride 0.9 % flush 10 mL (has no administration in time range)   dextrose (GLUTOSE) oral gel 15 g (has no administration in time range)   dextrose (D50W) (25 g/50 mL) IV injection 10-50 mL (has no administration in time range)   glucagon (human recombinant) (GLUCAGEN DIAGNOSTIC) injection 1 mg (has no administration in time range)   sodium chloride 0.9 % bolus (1,000 mL/hr Intravenous New Bag 5/9/22 7286)   sodium chloride 0.9 % infusion (has no administration in time range)   sodium chloride 0.9 % with KCl 20 mEq/L infusion (has no administration in time range)   sodium chloride 0.9 % with KCl 40 mEq/L infusion (has no administration in time range)   dextrose 5 % and sodium chloride 0.9 % infusion (has no administration in time range)   dextrose 5 % and sodium chloride 0.9 % with KCl 20 mEq/L infusion (has no administration in time range)   dextrose 5 % and sodium chloride 0.9 % with KCl 40 mEq/L infusion (has no administration in time range)   sodium chloride 0.45 % infusion (has no administration in time range)   sodium chloride 0.45 % with KCl 20 mEq/L infusion (has no administration in time range)   sodium chloride 0.45 % 1,000 mL with potassium chloride 40 mEq infusion (has no administration in time range)   dextrose 5 % and sodium chloride 0.45 % infusion (has no administration in time range)   dextrose 5 % and sodium chloride 0.45 % with KCl 20 mEq/L infusion (has no administration in time range)   dextrose 5 % and sodium chloride 0.45 % with KCl 40 mEq/L infusion (has no administration in time range)   insulin regular 1 unit/mL in 0.9% sodium chloride (1.8 Units/hr Intravenous Currently Infusing 5/9/22 1837)   sodium chloride 0.9 % flush 10 mL (has no administration in time range)   sodium chloride  0.9 % flush 10 mL (has no administration in time range)   Enoxaparin Sodium (LOVENOX) syringe 40 mg (has no administration in time range)   potassium chloride 10 mEq in 100 mL IVPB (has no administration in time range)   Magnesium Sulfate 2 gram Bolus, followed by 8 gram infusion (total Mg dose 10 grams)- Mg less than or equal to 1mg/dL (has no administration in time range)     Or   Magnesium Sulfate 2 gram / 50mL Infusion (GIVE X 3 BAGS TO EQUAL 6GM TOTAL DOSE) - Mg 1.1 - 1.5 mg/dl (has no administration in time range)     Or   Magnesium Sulfate 4 gram infusion- Mg 1.6-1.9 mg/dL (has no administration in time range)   potassium phosphate 45 mmol in sodium chloride 0.9 % 500 mL infusion (has no administration in time range)     Or   potassium phosphate 30 mmol in sodium chloride 0.9 % 250 mL infusion (has no administration in time range)     Or   potassium phosphate 15 mmol in 0.9% sodium chloride 100 mL IVPB (has no administration in time range)     Or   sodium phosphates 45 mmol in sodium chloride 0.9 % 250 mL IVPB (has no administration in time range)     Or   sodium phosphates 30 mmol in sodium chloride 0.9 % 250 mL IVPB (has no administration in time range)     Or   sodium phosphates 15 mmol in sodium chloride 0.9 % 250 mL IVPB (has no administration in time range)   calcium gluconate 1 g in sodium chloride 0.9 % 100 mL IVPB (has no administration in time range)     And   calcium gluconate 6 g in sodium chloride 0.9 % 500 mL IVPB (has no administration in time range)   sodium chloride 0.9 % bolus 2,000 mL (0 mL Intravenous Stopped 5/9/22 1344)   HYDROmorphone (DILAUDID) injection 0.5 mg (0.5 mg Intravenous Given 5/9/22 1219)   ondansetron (ZOFRAN) injection 4 mg (4 mg Intravenous Given 5/9/22 1219)   diphenhydrAMINE (BENADRYL) injection 50 mg (50 mg Intravenous Given 5/9/22 1344)   iopamidol (ISOVUE-370) 76 % injection 100 mL (90 mL Intravenous Given 5/9/22 1455)   insulin regular (humuLIN R,novoLIN R)  "injection 8 Units (8 Units Intravenous Given 5/9/22 1549)   ondansetron (ZOFRAN) injection 4 mg (4 mg Intravenous Given 5/9/22 1642)           ED Course as of 05/09/22 1918   Mon May 09, 2022   1124 Hemoglobin(!): 16.3 [MS]   1124 Hematocrit(!): 47.2 [MS]   1124 WBC(!): 11.58 [MS]   1210 Ketones, UA(!): >=160 mg/dL (4+) [MS]   1210 Lipase(!): 119 [MS]   1210 Anion Gap(!): 27.0 [MS]   1340 Patient expressed concern about receiving contrast for her studies due to a recollection that her \"heart rate went really low\" on the occasion of a contrasted study in the remote past.  She has no history of anaphylaxis or respiratory distress or hives or angioedema after contrast.  Will give Benadryl.  The patient and family understand that their bariatric surgeon has advised no steroid use.  We will honor that concern. [MS]   1531 Beta-Hydroxybutyrate Quant(!): 6.715 [MS]   1917 Patient's work-up is remarkable for diabetic ketoacidosis with marked elevation in her anion gap with ABG showing pH of 7.1.  Patient's vital signs have improved she is less tachycardic.  She has no hypotension.  Her pain is been easily to improve.  CT images are negative for acute findings.  I discussed her case with hospitalist as well as intensivist and Dr. Juarez Willoughby accepts intensive care.  Patient and her  understand and concur with this inpatient plan [MS]      ED Course User Index  [MS] Nancy Guerra APRN             AS OF 19:18 EDT VITALS:    BP - 116/66  HR - 109  TEMP - 97.7 °F (36.5 °C) (Oral)  O2 SATS - 96%                  DIAGNOSIS  Final diagnoses:   Diabetic ketoacidosis without coma associated with type 1 diabetes mellitus (HCC)   H/O gastric sleeve         DISPOSITION    Admitted to ICU           Nancy Guerra APRN  05/09/22 1918    "

## 2022-05-10 LAB
ANION GAP SERPL CALCULATED.3IONS-SCNC: 12 MMOL/L (ref 5–15)
ANION GAP SERPL CALCULATED.3IONS-SCNC: 13 MMOL/L (ref 5–15)
ANION GAP SERPL CALCULATED.3IONS-SCNC: 14 MMOL/L (ref 5–15)
ANION GAP SERPL CALCULATED.3IONS-SCNC: 14 MMOL/L (ref 5–15)
ANION GAP SERPL CALCULATED.3IONS-SCNC: 15 MMOL/L (ref 5–15)
B-OH-BUTYR SERPL-SCNC: 2.08 MMOL/L (ref 0.02–0.27)
BUN SERPL-MCNC: 3 MG/DL (ref 6–20)
BUN SERPL-MCNC: 3 MG/DL (ref 6–20)
BUN SERPL-MCNC: 4 MG/DL (ref 6–20)
BUN SERPL-MCNC: 4 MG/DL (ref 6–20)
BUN SERPL-MCNC: 5 MG/DL (ref 6–20)
BUN/CREAT SERPL: 10 (ref 7–25)
BUN/CREAT SERPL: 5.6 (ref 7–25)
BUN/CREAT SERPL: 6.5 (ref 7–25)
BUN/CREAT SERPL: 7.7 (ref 7–25)
BUN/CREAT SERPL: 8.5 (ref 7–25)
CA-I SERPL ISE-MCNC: 1.3 MMOL/L (ref 1.12–1.32)
CALCIUM SPEC-SCNC: 8.4 MG/DL (ref 8.6–10.5)
CALCIUM SPEC-SCNC: 8.6 MG/DL (ref 8.6–10.5)
CALCIUM SPEC-SCNC: 8.8 MG/DL (ref 8.6–10.5)
CALCIUM SPEC-SCNC: 9.3 MG/DL (ref 8.6–10.5)
CALCIUM SPEC-SCNC: 9.5 MG/DL (ref 8.6–10.5)
CHLORIDE SERPL-SCNC: 106 MMOL/L (ref 98–107)
CHLORIDE SERPL-SCNC: 107 MMOL/L (ref 98–107)
CHLORIDE SERPL-SCNC: 108 MMOL/L (ref 98–107)
CHLORIDE SERPL-SCNC: 108 MMOL/L (ref 98–107)
CHLORIDE SERPL-SCNC: 110 MMOL/L (ref 98–107)
CO2 SERPL-SCNC: 12 MMOL/L (ref 22–29)
CO2 SERPL-SCNC: 13 MMOL/L (ref 22–29)
CO2 SERPL-SCNC: 13 MMOL/L (ref 22–29)
CO2 SERPL-SCNC: 15 MMOL/L (ref 22–29)
CO2 SERPL-SCNC: 18 MMOL/L (ref 22–29)
CREAT SERPL-MCNC: 0.46 MG/DL (ref 0.57–1)
CREAT SERPL-MCNC: 0.47 MG/DL (ref 0.57–1)
CREAT SERPL-MCNC: 0.5 MG/DL (ref 0.57–1)
CREAT SERPL-MCNC: 0.52 MG/DL (ref 0.57–1)
CREAT SERPL-MCNC: 0.54 MG/DL (ref 0.57–1)
DEPRECATED RDW RBC AUTO: 40.9 FL (ref 37–54)
EGFRCR SERPLBLD CKD-EPI 2021: 121 ML/MIN/1.73
EGFRCR SERPLBLD CKD-EPI 2021: 122.1 ML/MIN/1.73
EGFRCR SERPLBLD CKD-EPI 2021: 123.3 ML/MIN/1.73
EGFRCR SERPLBLD CKD-EPI 2021: 125.1 ML/MIN/1.73
EGFRCR SERPLBLD CKD-EPI 2021: 125.8 ML/MIN/1.73
ERYTHROCYTE [DISTWIDTH] IN BLOOD BY AUTOMATED COUNT: 13.4 % (ref 12.3–15.4)
GLUCOSE BLDC GLUCOMTR-MCNC: 100 MG/DL (ref 70–130)
GLUCOSE BLDC GLUCOMTR-MCNC: 102 MG/DL (ref 70–130)
GLUCOSE BLDC GLUCOMTR-MCNC: 110 MG/DL (ref 70–130)
GLUCOSE BLDC GLUCOMTR-MCNC: 123 MG/DL (ref 70–130)
GLUCOSE BLDC GLUCOMTR-MCNC: 127 MG/DL (ref 70–130)
GLUCOSE BLDC GLUCOMTR-MCNC: 130 MG/DL (ref 70–130)
GLUCOSE BLDC GLUCOMTR-MCNC: 132 MG/DL (ref 70–130)
GLUCOSE BLDC GLUCOMTR-MCNC: 133 MG/DL (ref 70–130)
GLUCOSE BLDC GLUCOMTR-MCNC: 135 MG/DL (ref 70–130)
GLUCOSE BLDC GLUCOMTR-MCNC: 135 MG/DL (ref 70–130)
GLUCOSE BLDC GLUCOMTR-MCNC: 140 MG/DL (ref 70–130)
GLUCOSE BLDC GLUCOMTR-MCNC: 145 MG/DL (ref 70–130)
GLUCOSE BLDC GLUCOMTR-MCNC: 150 MG/DL (ref 70–130)
GLUCOSE BLDC GLUCOMTR-MCNC: 165 MG/DL (ref 70–130)
GLUCOSE BLDC GLUCOMTR-MCNC: 185 MG/DL (ref 70–130)
GLUCOSE BLDC GLUCOMTR-MCNC: 202 MG/DL (ref 70–130)
GLUCOSE BLDC GLUCOMTR-MCNC: 217 MG/DL (ref 70–130)
GLUCOSE BLDC GLUCOMTR-MCNC: 73 MG/DL (ref 70–130)
GLUCOSE BLDC GLUCOMTR-MCNC: 92 MG/DL (ref 70–130)
GLUCOSE BLDC GLUCOMTR-MCNC: 98 MG/DL (ref 70–130)
GLUCOSE SERPL-MCNC: 139 MG/DL (ref 65–99)
GLUCOSE SERPL-MCNC: 150 MG/DL (ref 65–99)
GLUCOSE SERPL-MCNC: 155 MG/DL (ref 65–99)
GLUCOSE SERPL-MCNC: 161 MG/DL (ref 65–99)
GLUCOSE SERPL-MCNC: 217 MG/DL (ref 65–99)
HCT VFR BLD AUTO: 39.2 % (ref 34–46.6)
HGB BLD-MCNC: 13.5 G/DL (ref 12–15.9)
MAGNESIUM SERPL-MCNC: 1.6 MG/DL (ref 1.6–2.6)
MAGNESIUM SERPL-MCNC: 1.6 MG/DL (ref 1.6–2.6)
MAGNESIUM SERPL-MCNC: 1.7 MG/DL (ref 1.6–2.6)
MAGNESIUM SERPL-MCNC: 1.8 MG/DL (ref 1.6–2.6)
MCH RBC QN AUTO: 29.5 PG (ref 26.6–33)
MCHC RBC AUTO-ENTMCNC: 34.4 G/DL (ref 31.5–35.7)
MCV RBC AUTO: 85.8 FL (ref 79–97)
PHOSPHATE SERPL-MCNC: 1.2 MG/DL (ref 2.5–4.5)
PHOSPHATE SERPL-MCNC: 1.7 MG/DL (ref 2.5–4.5)
PHOSPHATE SERPL-MCNC: 1.9 MG/DL (ref 2.5–4.5)
PHOSPHATE SERPL-MCNC: 2 MG/DL (ref 2.5–4.5)
PLATELET # BLD AUTO: 299 10*3/MM3 (ref 140–450)
PMV BLD AUTO: 10.6 FL (ref 6–12)
POTASSIUM SERPL-SCNC: 3 MMOL/L (ref 3.5–5.2)
POTASSIUM SERPL-SCNC: 3.2 MMOL/L (ref 3.5–5.2)
POTASSIUM SERPL-SCNC: 3.2 MMOL/L (ref 3.5–5.2)
POTASSIUM SERPL-SCNC: 3.6 MMOL/L (ref 3.5–5.2)
POTASSIUM SERPL-SCNC: 3.8 MMOL/L (ref 3.5–5.2)
QT INTERVAL: 258 MS
QTC INTERVAL: 385 MS
RBC # BLD AUTO: 4.57 10*6/MM3 (ref 3.77–5.28)
SODIUM SERPL-SCNC: 135 MMOL/L (ref 136–145)
SODIUM SERPL-SCNC: 135 MMOL/L (ref 136–145)
SODIUM SERPL-SCNC: 136 MMOL/L (ref 136–145)
WBC NRBC COR # BLD: 6.79 10*3/MM3 (ref 3.4–10.8)

## 2022-05-10 PROCEDURE — 99232 SBSQ HOSP IP/OBS MODERATE 35: CPT | Performed by: INTERNAL MEDICINE

## 2022-05-10 PROCEDURE — 82330 ASSAY OF CALCIUM: CPT | Performed by: INTERNAL MEDICINE

## 2022-05-10 PROCEDURE — 84100 ASSAY OF PHOSPHORUS: CPT | Performed by: INTERNAL MEDICINE

## 2022-05-10 PROCEDURE — 99233 SBSQ HOSP IP/OBS HIGH 50: CPT | Performed by: SURGERY

## 2022-05-10 PROCEDURE — 83735 ASSAY OF MAGNESIUM: CPT | Performed by: INTERNAL MEDICINE

## 2022-05-10 PROCEDURE — 80048 BASIC METABOLIC PNL TOTAL CA: CPT | Performed by: INTERNAL MEDICINE

## 2022-05-10 PROCEDURE — 0 MAGNESIUM SULFATE 4 GM/100ML SOLUTION: Performed by: INTERNAL MEDICINE

## 2022-05-10 PROCEDURE — 25010000002 THIAMINE PER 100 MG: Performed by: INTERNAL MEDICINE

## 2022-05-10 PROCEDURE — 85027 COMPLETE CBC AUTOMATED: CPT | Performed by: INTERNAL MEDICINE

## 2022-05-10 PROCEDURE — G0108 DIAB MANAGE TRN  PER INDIV: HCPCS | Performed by: REGISTERED NURSE

## 2022-05-10 PROCEDURE — 63710000001 INSULIN DETEMIR PER 5 UNITS: Performed by: INTERNAL MEDICINE

## 2022-05-10 PROCEDURE — 25010000002 ENOXAPARIN PER 10 MG: Performed by: INTERNAL MEDICINE

## 2022-05-10 PROCEDURE — 82010 KETONE BODYS QUAN: CPT | Performed by: INTERNAL MEDICINE

## 2022-05-10 PROCEDURE — 82962 GLUCOSE BLOOD TEST: CPT

## 2022-05-10 RX ORDER — PANTOPRAZOLE SODIUM 40 MG/10ML
40 INJECTION, POWDER, LYOPHILIZED, FOR SOLUTION INTRAVENOUS
Status: DISCONTINUED | OUTPATIENT
Start: 2022-05-11 | End: 2022-05-12

## 2022-05-10 RX ORDER — INSULIN LISPRO 100 [IU]/ML
8 INJECTION, SOLUTION INTRAVENOUS; SUBCUTANEOUS
Status: DISCONTINUED | OUTPATIENT
Start: 2022-05-11 | End: 2022-05-12 | Stop reason: HOSPADM

## 2022-05-10 RX ORDER — DIPHENOXYLATE HYDROCHLORIDE AND ATROPINE SULFATE 2.5; .025 MG/1; MG/1
1 TABLET ORAL DAILY
Status: DISCONTINUED | OUTPATIENT
Start: 2022-05-11 | End: 2022-05-12 | Stop reason: HOSPADM

## 2022-05-10 RX ORDER — INSULIN LISPRO 100 [IU]/ML
0-7 INJECTION, SOLUTION INTRAVENOUS; SUBCUTANEOUS
Status: DISCONTINUED | OUTPATIENT
Start: 2022-05-10 | End: 2022-05-12 | Stop reason: HOSPADM

## 2022-05-10 RX ORDER — MAGNESIUM SULFATE HEPTAHYDRATE 40 MG/ML
4 INJECTION, SOLUTION INTRAVENOUS AS NEEDED
Status: DISCONTINUED | OUTPATIENT
Start: 2022-05-10 | End: 2022-05-12 | Stop reason: HOSPADM

## 2022-05-10 RX ORDER — DEXTROSE, SODIUM CHLORIDE, SODIUM LACTATE, POTASSIUM CHLORIDE, AND CALCIUM CHLORIDE 5; .6; .31; .03; .02 G/100ML; G/100ML; G/100ML; G/100ML; G/100ML
100 INJECTION, SOLUTION INTRAVENOUS CONTINUOUS
Status: DISCONTINUED | OUTPATIENT
Start: 2022-05-10 | End: 2022-05-11

## 2022-05-10 RX ORDER — LANOLIN ALCOHOL/MO/W.PET/CERES
1000 CREAM (GRAM) TOPICAL DAILY
Status: DISCONTINUED | OUTPATIENT
Start: 2022-05-11 | End: 2022-05-12 | Stop reason: HOSPADM

## 2022-05-10 RX ORDER — POTASSIUM CHLORIDE 7.45 MG/ML
10 INJECTION INTRAVENOUS
Status: DISCONTINUED | OUTPATIENT
Start: 2022-05-10 | End: 2022-05-12 | Stop reason: HOSPADM

## 2022-05-10 RX ORDER — POTASSIUM CHLORIDE 750 MG/1
40 CAPSULE, EXTENDED RELEASE ORAL AS NEEDED
Status: DISCONTINUED | OUTPATIENT
Start: 2022-05-10 | End: 2022-05-12 | Stop reason: HOSPADM

## 2022-05-10 RX ORDER — FERROUS SULFATE 325(65) MG
325 TABLET ORAL
Status: DISCONTINUED | OUTPATIENT
Start: 2022-05-11 | End: 2022-05-12 | Stop reason: HOSPADM

## 2022-05-10 RX ORDER — ASCORBIC ACID 500 MG
1000 TABLET ORAL EVERY MORNING
Status: DISCONTINUED | OUTPATIENT
Start: 2022-05-11 | End: 2022-05-12 | Stop reason: HOSPADM

## 2022-05-10 RX ADMIN — ENOXAPARIN SODIUM 40 MG: 40 INJECTION SUBCUTANEOUS at 08:35

## 2022-05-10 RX ADMIN — POTASSIUM CHLORIDE 40 MEQ: 750 CAPSULE, EXTENDED RELEASE ORAL at 04:02

## 2022-05-10 RX ADMIN — POTASSIUM CHLORIDE 40 MEQ: 750 CAPSULE, EXTENDED RELEASE ORAL at 08:35

## 2022-05-10 RX ADMIN — POTASSIUM PHOSPHATE, MONOBASIC POTASSIUM PHOSPHATE, DIBASIC 30 MMOL: 224; 236 INJECTION, SOLUTION, CONCENTRATE INTRAVENOUS at 13:54

## 2022-05-10 RX ADMIN — THIAMINE HYDROCHLORIDE 200 MG: 100 INJECTION, SOLUTION INTRAMUSCULAR; INTRAVENOUS at 11:49

## 2022-05-10 RX ADMIN — INSULIN HUMAN 2.7 UNITS/HR: 1 INJECTION, SOLUTION INTRAVENOUS at 18:36

## 2022-05-10 RX ADMIN — INSULIN DETEMIR 12 UNITS: 100 INJECTION, SOLUTION SUBCUTANEOUS at 22:27

## 2022-05-10 RX ADMIN — Medication 10 ML: at 21:00

## 2022-05-10 RX ADMIN — SODIUM CHLORIDE, SODIUM LACTATE, POTASSIUM CHLORIDE, CALCIUM CHLORIDE AND DEXTROSE MONOHYDRATE 100 ML/HR: 5; 600; 310; 30; 20 INJECTION, SOLUTION INTRAVENOUS at 08:37

## 2022-05-10 RX ADMIN — MAGNESIUM SULFATE HEPTAHYDRATE 4 G: 40 INJECTION, SOLUTION INTRAVENOUS at 18:36

## 2022-05-10 RX ADMIN — THIAMINE HYDROCHLORIDE 200 MG: 100 INJECTION, SOLUTION INTRAMUSCULAR; INTRAVENOUS at 20:38

## 2022-05-10 RX ADMIN — POTASSIUM CHLORIDE, DEXTROSE MONOHYDRATE AND SODIUM CHLORIDE 150 ML/HR: 300; 5; 450 INJECTION, SOLUTION INTRAVENOUS at 05:53

## 2022-05-10 RX ADMIN — POTASSIUM CHLORIDE 40 MEQ: 750 CAPSULE, EXTENDED RELEASE ORAL at 11:56

## 2022-05-10 NOTE — CONSULTS
Reviewed chart for Ms. Barreto today.  She has diagnosis of Type 1 diabetes. Noted a1c of 10.8%.  Noted on insulin at home to treat diabetes.  Spoke to Ms. Barreto at the bedside.  She stated she has had diabetes since she was in her 20's.  She states she has type 1 diabetes.  She states she has been on Omnipod pump and Dexcom usually.  She states that she see's planning to see endocrinology in [place] on June 1, 2022.  She states that she had surgery recently, and she does not think she is going to be able to go back on the pump until she follows up with provider for new settings.  She states she will likely go home on insulin shots.  She states she has given herself insulin in the past.  Reviewed basal bolus insulin injection briefly and reminded her that holding Basal insulin is like holding tomorrows mediation, and recommended checking blood sugar before bedtime and dose of basal insulin.  Recommended eating a protein and carbohydrate snack at bedtime if blood sugar is around 100 in order to help prevent low blood sugar.  She states she knows when her blood sugar is high because she has blurry vision.  She states she knows when it is low because she gets week and has slurred speech. Reviewed low blood sugar and how to treat with rule of 15 if less than 70.  Reviewed options for fast acting carbohydrate.  She states she carries glucose tabs, but stated one time she passed out due to low blood sugar.  Explained Glucagon emergency kit as a possible solution.  Encouraged her to ask provider about it as well.  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 She states dog alerts her when she is about 50-60 usually.  She was given our What is Diabetes handout.  Reviewed recommended blood sugar ranges of  before meals and  2 hours after meals.  Reviewed time in range for CGM.  She stated she understood.  She was given our resource number for any future  questions and encouraged to let her nurse know if she needed to talk to a diabetes educator again before she leaves here.  Thank you for this referral.

## 2022-05-10 NOTE — PAYOR COMM NOTE
"Mary LouFrankieCora DEANNA (36 y.o. Female)             Date of Birth   1985    Social Security Number       Address   [address]  [place] [address]    Home Phone   [phone]    MRN   [id number]       Anabaptism   None    Marital Status                               Admission Date   5/9/22    Admission Type   Emergency    Admitting Provider   Kelby Willoughby MD    Attending Provider   Roland Phillip MD    Department, Room/Bed   [facility] 2A ICU, N221/1       Discharge Date       Discharge Disposition       Discharge Destination                               Attending Provider: Roland Phillip MD    Allergies: Morphine, Adhesive Tape, Hydrocodone, Jardiance [Empagliflozin], Latex, Trulicity [Dulaglutide]    Isolation: None   Infection: MRSA (04/26/22)   Code Status: CPR   Advance Care Planning Activity    Ht: 154.9 cm (61\")   Wt: 78 kg (172 lb)    Admission Cmt: None   Principal Problem: DKA [E10.10]                 Active Insurance as of 5/9/2022     Primary Coverage     Payor Plan Insurance Group Employer/Plan Group    [employer] [place]      Payor Plan Address Payor Plan Phone Number Payor Plan Fax Number Effective Dates    [address] [address] [phone]  3/12/2019 - None Entered    [facility] [id number]       Subscriber Name Subscriber Birth Date Member ID       CORA JARVIS 1985 [id number]                 Emergency Contacts      (Rel.) Home Phone Work Phone Mobile Phone    JOHNNIE JARVIS (Spouse) [phone] -- --            [place]: [facility] [id number] Tax ID [id number]  Insurance Information                [facility] MEDICAID Phone: [phone]    Subscriber: Cora Jarvis Subscriber#: [id number]    Group#: -- Precert#: --             History & Physical      Kelby Willoughby MD at 05/09/22 1750            CRITICAL CARE ADMISSION NOTE    Chief Complaint     Diabetic ketoacidosis without coma associated with " "type 1 diabetes mellitus (HCC)    History of Present Illness     36-year-old type I diabetic admitted to the intensive care unit on 5/9/2022 with diabetic ketoacidosis    The patient has type 1 diabetes mellitus.  She underwent a lap sleeve gastrectomy and cholecystectomy by Dr. Finnegan on 4/28/2022.  Her hospital course was complicated by elevated LFTs.  She underwent an MRCP that did not show bile duct defects or distention.  She subsequently improved and was discharged home on 5/2/2022.    The patient has done poorly since discharge.  She has not been able to take in much nutritional supplementation or fluids.  She has had ongoing nausea, vomiting, and abdominal pain.  The narcotic pain medicine prescribed for her was poorly tolerated.  She did not \"like the way it made her feel\".    She has been using \"as needed\" insulin since discharge and for the 2 days prior to her admission she took and no insulin at all.  Finally today she used 10 units of fast acting insulin this morning.  She was evaluated in Dr. Finnegan's office this morning and told to go to the emergency room where she was found to have a severe metabolic acidosis and a clinical picture consistent with DKA.    Patient has not experienced DKA in the past.  She is a non-smoker and has had no recent alcohol intake    Problem List, Surgical History, Family, Social History, and ROS     Patient Active Problem List   Diagnosis   • Left lower quadrant pain   • Constipation   • Bright red blood per rectum   • Nausea   • Dysphagia   • Heartburn   • Diarrhea   • Colon cancer screening   • HLD (hyperlipidemia)   • Dyspepsia   • Diverticulosis   • Wheezing   • Migraine   • Diabetes mellitus (HCC)   • Anxiety   • Depression   • Hypercholesteremia   • Murmur, cardiac   • Metabolic syndrome   • Pre-operative cardiovascular examination   • Morbid exogenous obesity (S/P Lap Sleeve Gastrec jaylyn and Lap Choly 4/28/2022)   • Diabetic ketoacidosis without coma associated with " type 1 diabetes mellitus (HCC)   • UTI (urinary tract infection)   • Proteinuria     Past Surgical History:   Procedure Laterality Date   •  SECTION     •  SECTION     •  SECTION     • CHOLECYSTECTOMY N/A 2022    Procedure: CHOLECYSTECTOMY LAPAROSCOPIC;  Surgeon: Yunior Finnegan MD;  Location:  [facility];  Service: General;  Laterality: N/A;   • COLONOSCOPY N/A 04/15/2019    Procedure: COLONOSCOPY W/ COLD FORCEP BIOPSIES; COLD SNARE POLYPECTOMY; COLD FORCEP POLYPECTOMY; RESOLUTION CLIP X1;  Surgeon: Adán Pena MD;  Location: [facility] ENDOSCOPY;  Service: Gastroenterology   • D & C HYSTEROSCOPY ENDOMETRIAL ABLATION     • ENDOSCOPY N/A 2019    Procedure: ESOPHAGOGASTRODUODENOSCOPY with cold biopsies and dilation;  Surgeon: Adán Pena MD;  Location: [facility] ENDOSCOPY;  Service: Gastroenterology   • ENDOSCOPY N/A 2022    Procedure: ESOPHAGOGASTRODUODENOSCOPY;  Surgeon: Yunior Finnegan MD;  Location:  [facility];  Service: Bariatric;  Laterality: N/A;   • GASTRIC SLEEVE LAPAROSCOPIC N/A 2022    Procedure: GASTRIC SLEEVE LAPAROSCOPIC;  Surgeon: Yunior Finnegan MD;  Location:  [facility];  Service: Bariatric;  Laterality: N/A;   • TEETH EXTRACTION      dentures on upper plate   • TOTAL ABDOMINAL HYSTERECTOMY      low transverse.  [facility] - lap converted to open for scar tissue   • TUBAL ABDOMINAL LIGATION         Allergies   Allergen Reactions   • Morphine Itching   • Adhesive Tape Dermatitis     Luisa. Tele electrodes   • Hydrocodone Itching and Rash   • Jardiance [Empagliflozin] Itching and Rash   • Latex Rash   • Trulicity [Dulaglutide] Nausea Only     No current facility-administered medications on file prior to encounter.     Current Outpatient Medications on File Prior to Encounter   Medication Sig   • Continuous Blood Gluc Sensor (Dexcom G6 Sensor) USE TO CHECK BLOOD SUGAR AND CHANGE EVERY 10 DAYS   • cyclobenzaprine  (FLEXERIL) 10 MG tablet Take 1 tablet by mouth 3 (Three) Times a Day As Needed for Muscle Spasms.   • insulin detemir (LEVEMIR) 100 UNIT/ML injection Inject 8 Units under the skin into the appropriate area as directed Every Night.   • Insulin Lispro, 1 Unit Dial, (HUMALOG) 100 UNIT/ML solution pen-injector Inject 4 Units under the skin into the appropriate area as directed 3 (Three) Times a Day With Meals.   • Insulin Lispro (humaLOG) 100 UNIT/ML injection Inject 0-12 Units under the skin into the appropriate area as directed 4 (Four) Times a Day Before Meals & at Bedtime. Correction - Moderate-High Dose.  60-80 units/day total insulin dose or uses insulin at home  Blood glucose 150-199 mg/dL - 3 units  Blood glucose 200-249 mg/dL - 5 units  Blood glucose 250-299 mg/dL - 8 units  Blood glucose 300-349 mg/dL - 10 units  Blood glucose 350-400 mg/dL - 12 units   • Insulin Pen Needle (B-D UF III MINI PEN NEEDLES) 31G X 5 MM misc Use as directed four times daily.   • omeprazole (priLOSEC) 40 MG capsule Take 1 capsule by mouth Daily for 60 days.   • ondansetron (ZOFRAN) 4 MG tablet Take 4 mg by mouth Every 8 (Eight) Hours As Needed for Nausea.   • ondansetron (Zofran) 4 MG tablet Take 1 tablet by mouth Every 4 (Four) Hours As Needed for Nausea.   • oxyCODONE (Roxicodone) 5 MG immediate release tablet Take 1 tablet by mouth Every 4 (Four) Hours As Needed for Moderate Pain .   • promethazine (PHENERGAN) 12.5 MG tablet Take 1 tablet by mouth Every 4 (Four) Hours As Needed for Nausea.     MEDICATION LIST AND ALLERGIES REVIEWED.    Family History   Problem Relation Age of Onset   • Colon cancer Paternal Uncle    • Colon cancer Maternal Grandmother    • Crohn's disease Maternal Grandmother    • Diabetes Maternal Grandmother    • Colon cancer Paternal Grandmother    • Other Paternal Grandmother         Colitis   • Diabetes Paternal Grandmother    • Hyperlipidemia Paternal Grandmother    • Hypertension Paternal Grandmother    •  "Sleep apnea Paternal Grandmother    • Colon cancer Paternal Grandfather    • Diabetes Paternal Grandfather    • Hypertension Paternal Grandfather    • Hypertension Mother    • Cancer Mother    • Diabetes Maternal Grandfather    • Hypertension Maternal Grandfather    • Stroke Maternal Grandfather    • No Known Problems Brother    • No Known Problems Son    • No Known Problems Son    • No Known Problems Daughter    • No Known Problems Daughter      Social History     Tobacco Use   • Smoking status: Former Smoker     Packs/day: 0.25     Years: 9.00     Pack years: 2.25     Types: Cigarettes     Quit date:      Years since quittin.3   • Smokeless tobacco: Never Used   Vaping Use   • Vaping Use: Never used   Substance Use Topics   • Alcohol use: Not Currently     Comment: social   • Drug use: No     Social History     Social History Narrative    Lives in [facility], close to [place].   with four children.  Works as a groomer.       FAMILY AND SOCIAL HISTORY REVIEWED.    Review of Systems  AS ABOVE OR ALL OTHER SYSTEMS REVIEWED AND ARE NEGATIVE.    Physical Exam and Clinical Information   /74   Pulse 109   Temp 97.7 °F (36.5 °C) (Oral)   Resp 18   Ht 154.9 cm (61\")   Wt 78 kg (172 lb)   LMP  (LMP Unknown)   SpO2 97%   BMI 32.50 kg/m²   Physical Exam:   GENERAL: Awake, no distress.  The smell of ketones are present   HEENT: No adenopathy or thyromegaly   LUNGS: Clear without wheezes or rhonchi   HEART: Regular tachycardia without murmurs   GI: Laparoscopy incision sites appear to be healing well with no erythema or drainage.  Abdomen is mildly diffusely tender but bowel sounds are present in the left lower quadrant   EXTREMITIES: No clubbing, cyanosis, or edema   NEURO/PSYCH: Awake, alert, appropriate    Results from last 7 days   Lab Units 22  1103   WBC 10*3/mm3 11.58*   HEMOGLOBIN g/dL 16.3*   PLATELETS 10*3/mm3 426     Results from last 7 days   Lab Units 22  1103   SODIUM " mmol/L 129*   POTASSIUM mmol/L 3.7   CO2 mmol/L 6.0*   BUN mg/dL 13   CREATININE mg/dL 0.89   MAGNESIUM mg/dL 2.0   GLUCOSE mg/dL 379*     Estimated Creatinine Clearance: 81 mL/min (by C-G formula based on SCr of 0.89 mg/dL).      Results from last 7 days   Lab Units 05/09/22  1639   PH, ARTERIAL pH units 7.164*   PCO2, ARTERIAL mm Hg 21.3*   PO2 ART mm Hg 106.0     Lab Results   Component Value Date    LACTATE 1.4 05/09/2022        IMAGES: CT scan of the abdomen pelvis reveals no intra-abdominal pathology    I reviewed the patient's results and images.     Assesment     Active Hospital Problems    Diagnosis    • **Diabetic ketoacidosis without coma associated with type 1 diabetes mellitus (HCC)    • UTI (urinary tract infection)    • Proteinuria    • Morbid exogenous obesity (S/P Lap Sleeve Gastrec jaylyn and Lap Choly 4/28/2022)      Plan/Recommendations     DKA probably due to lack of insulin intake and type I diabetic.  She may have a concomitant urinary tract infection although UA is somewhat equivocal.    Plan  Admit to the intensive care unit  DKA protocol  Empiric antibiotics for UTI  Follow-up urine culture  Serial abdominal exam    Critical Care time spent in direct patient care: 32 minutes (excluding procedure time, if applicable) including high complexity decision making to assess, manipulate, and support vital organ system failure in this individual who has impairment of one or more vital organ systems such that there is a high probability of imminent or life threatening deterioration in the patient’s condition.    STEFANY Willoughby MD  Pulmonary and Critical Care Medicine     CC: Yolanda Tian APRN    Electronically signed by Kelby Willoughby MD at 05/09/22 1801          Emergency Department Notes      Jolanta Servin RN at 05/09/22 1146        Notified Ian in lab regarding add on labs.    Electronically signed by Jolanta Servin RN at 05/09/22 0313     Nancy Guerra APRN at  22      Procedure Orders    1. Critical Care [[id number]] ordered by Nancy Guerra APRN          Attestation signed by Bryon Miranda MD at 22        SUPERVISE: For this patient encounter, I reviewed the APC's documentation, treatment plan, and medical decision making.  Bryon Miranda MD 2022 20:04 EDT                          EMERGENCY DEPARTMENT ENCOUNTER    Pt Name: Rain Barreto  MRN: [id number]  Pt :   1985  Room Number:    Date of encounter:  2022  PCP: Yolanda Tian APRN  ED Provider: SATHISH Pretty    Historian: patient and        HPI:  Chief Complaint: abdominal pain        Context: Rain Barreto is a 36 y.o. female who presents to the ED complaining of abdominal pain that has worsened in the last 72 hours.  Patient is approximately [place] days after gastric sleeve surgery on .  She had a hospitalization that was complicated by a hematoma around her incision.  She has had elevated liver enzymes and hyper and hypoglycemia.    Her pain has increased and has been unresponsive to her routine use of hydrocodone in the last 48 hours.  She began vomiting last night without any hematemesis.  She experiencing profound nausea and dizziness with standing.  She is experiencing palpitations and racing heart rate.  Her abdominal pain is 10 out of 10.    Review of systems is negative for for fever but positive for chills.  Positive for palpitations without cough or shortness of breath or chest pain.  Positive for nausea and vomiting without diarrhea.  Positive for epigastric abdominal pain.  Positive for profound weakness, dizziness without syncope.  No neurosensory complaints or focal weakness.  Negative for rash      PAST MEDICAL HISTORY  Past Medical History:   Diagnosis Date   • Anxiety    • Depression    • Diabetes mellitus (HCC)     Type I, has detachable pump. dx in 20's   • Diverticulosis    • Dyspepsia    • Gallstones    •  "Heartburn     takes prn zantac and tums, EGD  witth dilatation, EGD Dr. Finnegan 21   • HLD (hyperlipidemia)    • Hypertension     denies   • Kidney infection    • Kidney stone    • Migraine    • MRSA carrier     PAT (+) 22   •  (normal spontaneous vaginal delivery)     1st child w/ preeclampsia   • PONV (postoperative nausea and vomiting)    • Tattoos    • Wears dentures     upper   • Wheezing     \"seasonal allergies\" uses inhalers prn         PAST SURGICAL HISTORY  Past Surgical History:   Procedure Laterality Date   •  SECTION     •  SECTION     •  SECTION     • CHOLECYSTECTOMY N/A 2022    Procedure: CHOLECYSTECTOMY LAPAROSCOPIC;  Surgeon: Yunior Finnegan MD;  Location:  [facility];  Service: General;  Laterality: N/A;   • COLONOSCOPY N/A 04/15/2019    Procedure: COLONOSCOPY W/ COLD FORCEP BIOPSIES; COLD SNARE POLYPECTOMY; COLD FORCEP POLYPECTOMY; RESOLUTION CLIP X1;  Surgeon: Adán Pena MD;  Location: [facility] ENDOSCOPY;  Service: Gastroenterology   • D & C HYSTEROSCOPY ENDOMETRIAL ABLATION     • ENDOSCOPY N/A 2019    Procedure: ESOPHAGOGASTRODUODENOSCOPY with cold biopsies and dilation;  Surgeon: Adán Pena MD;  Location:  [facility];  Service: Gastroenterology   • ENDOSCOPY N/A 2022    Procedure: ESOPHAGOGASTRODUODENOSCOPY;  Surgeon: Yunior Finnegan MD;  Location:  [facility];  Service: Bariatric;  Laterality: N/A;   • GASTRIC SLEEVE LAPAROSCOPIC N/A 2022    Procedure: GASTRIC SLEEVE LAPAROSCOPIC;  Surgeon: Yunior Finnegan MD;  Location:  [facility];  Service: Bariatric;  Laterality: N/A;   • TEETH EXTRACTION      dentures on upper plate   • TOTAL ABDOMINAL HYSTERECTOMY      low transverse.  [facility] - lap converted to open for scar tissue   • TUBAL ABDOMINAL LIGATION           FAMILY HISTORY  Family History   Problem Relation Age of Onset   • Colon cancer Paternal Uncle    • Colon cancer Maternal " Grandmother    • Crohn's disease Maternal Grandmother    • Diabetes Maternal Grandmother    • Colon cancer Paternal Grandmother    • Other Paternal Grandmother         Colitis   • Diabetes Paternal Grandmother    • Hyperlipidemia Paternal Grandmother    • Hypertension Paternal Grandmother    • Sleep apnea Paternal Grandmother    • Colon cancer Paternal Grandfather    • Diabetes Paternal Grandfather    • Hypertension Paternal Grandfather    • Hypertension Mother    • Cancer Mother    • Diabetes Maternal Grandfather    • Hypertension Maternal Grandfather    • Stroke Maternal Grandfather    • No Known Problems Brother    • No Known Problems Son    • No Known Problems Son    • No Known Problems Daughter    • No Known Problems Daughter          SOCIAL HISTORY  Social History     Socioeconomic History   • Marital status:    Tobacco Use   • Smoking status: Former Smoker     Packs/day: 0.25     Years: 9.00     Pack years: 2.25     Types: Cigarettes     Quit date:      Years since quittin.3   • Smokeless tobacco: Never Used   Vaping Use   • Vaping Use: Never used   Substance and Sexual Activity   • Alcohol use: Not Currently     Comment: social   • Drug use: No   • Sexual activity: Defer         ALLERGIES  Morphine, Adhesive tape, Hydrocodone, Jardiance [empagliflozin], Latex, and Trulicity [dulaglutide]        REVIEW OF SYSTEMS  Review of Systems     All systems reviewed and negative except for those discussed in HPI.       PHYSICAL EXAM    I have reviewed the triage vital signs and nursing notes.    ED Triage Vitals [22 1016]   Temp Heart Rate Resp BP SpO2   97.7 °F (36.5 °C) (!) 131 24 149/67 99 %      Temp src Heart Rate Source Patient Position BP Location FiO2 (%)   Oral Monitor Sitting Right arm --       Physical Exam  GENERAL:   Appears ill-appearing.  She is tachycardic.  She is a very good historian.  She is tachypneic.  HENT: Nares patent.  EYES: No scleral icterus.  CV: Regular rhythm,  tachycardic rate 120.  No murmur.  No peripheral edema  RESPIRATORY: Normal effort.  No audible wheezes, rales or rhonchi.  She is slightly tachypneic.  ABDOMEN: Soft, diffuse moderate abdominal pain.  Positive for guarding.  No CVA tenderness.  Her incisions are well approximated without obvious cellulitis or seroma or abscess.  MUSCULOSKELETAL: No deformities.   NEURO: Alert, moves all extremities, follows commands.  No photophobia or meningeal signs.  No neurosensory deficits or focal weakness.  SKIN: Warm, dry, no rash visualized.        LAB RESULTS  Recent Results (from the past 24 hour(s))   Comprehensive Metabolic Panel    Collection Time: 05/09/22 11:03 AM    Specimen: Blood   Result Value Ref Range    Glucose 379 (H) 65 - 99 mg/dL    BUN 13 6 - 20 mg/dL    Creatinine 0.89 0.57 - 1.00 mg/dL    Sodium 129 (L) 136 - 145 mmol/L    Potassium 3.7 3.5 - 5.2 mmol/L    Chloride 96 (L) 98 - 107 mmol/L    CO2 6.0 (L) 22.0 - 29.0 mmol/L    Calcium 9.3 8.6 - 10.5 mg/dL    Total Protein 8.6 (H) 6.0 - 8.5 g/dL    Albumin 4.30 3.50 - 5.20 g/dL    ALT (SGPT) 27 1 - 33 U/L    AST (SGOT) 9 1 - 32 U/L    Alkaline Phosphatase 199 (H) 39 - 117 U/L    Total Bilirubin 0.3 0.0 - 1.2 mg/dL    Globulin 4.3 gm/dL    A/G Ratio 1.0 g/dL    BUN/Creatinine Ratio 14.6 7.0 - 25.0    Anion Gap 27.0 (H) 5.0 - 15.0 mmol/L    eGFR 85.2 >60.0 mL/min/1.73   Lipase    Collection Time: 05/09/22 11:03 AM    Specimen: Blood   Result Value Ref Range    Lipase 119 (H) 13 - 60 U/L   Troponin    Collection Time: 05/09/22 11:03 AM    Specimen: Blood   Result Value Ref Range    Troponin T <0.010 0.000 - 0.030 ng/mL   Green Top (Gel)    Collection Time: 05/09/22 11:03 AM   Result Value Ref Range    Extra Tube Hold for add-ons.    Lavender Top    Collection Time: 05/09/22 11:03 AM   Result Value Ref Range    Extra Tube hold for add-on    Gold Top - SST    Collection Time: 05/09/22 11:03 AM   Result Value Ref Range    Extra Tube Hold for add-ons.    Gray Top     Collection Time: 05/09/22 11:03 AM   Result Value Ref Range    Extra Tube Hold for add-ons.    Light Blue Top    Collection Time: 05/09/22 11:03 AM   Result Value Ref Range    Extra Tube hold for add-on    CBC Auto Differential    Collection Time: 05/09/22 11:03 AM    Specimen: Blood   Result Value Ref Range    WBC 11.58 (H) 3.40 - 10.80 10*3/mm3    RBC 5.54 (H) 3.77 - 5.28 10*6/mm3    Hemoglobin 16.3 (H) 12.0 - 15.9 g/dL    Hematocrit 47.2 (H) 34.0 - 46.6 %    MCV 85.2 79.0 - 97.0 fL    MCH 29.4 26.6 - 33.0 pg    MCHC 34.5 31.5 - 35.7 g/dL    RDW 13.0 12.3 - 15.4 %    RDW-SD 39.3 37.0 - 54.0 fl    MPV 9.9 6.0 - 12.0 fL    Platelets 426 140 - 450 10*3/mm3    Neutrophil % 85.5 (H) 42.7 - 76.0 %    Lymphocyte % 7.9 (L) 19.6 - 45.3 %    Monocyte % 5.4 5.0 - 12.0 %    Eosinophil % 0.2 (L) 0.3 - 6.2 %    Basophil % 0.5 0.0 - 1.5 %    Immature Grans % 0.5 0.0 - 0.5 %    Neutrophils, Absolute 9.89 (H) 1.70 - 7.00 10*3/mm3    Lymphocytes, Absolute 0.92 0.70 - 3.10 10*3/mm3    Monocytes, Absolute 0.63 0.10 - 0.90 10*3/mm3    Eosinophils, Absolute 0.02 0.00 - 0.40 10*3/mm3    Basophils, Absolute 0.06 0.00 - 0.20 10*3/mm3    Immature Grans, Absolute 0.06 (H) 0.00 - 0.05 10*3/mm3    nRBC 0.0 0.0 - 0.2 /100 WBC   Beta Hydroxybutyrate Quantitative    Collection Time: 05/09/22 11:03 AM    Specimen: Blood   Result Value Ref Range    Beta-Hydroxybutyrate Quant 6.715 (H) 0.020 - 0.270 mmol/L   Lactic Acid, Plasma    Collection Time: 05/09/22 11:03 AM    Specimen: Blood   Result Value Ref Range    Lactate 1.4 0.5 - 2.0 mmol/L   CK    Collection Time: 05/09/22 11:03 AM    Specimen: Blood   Result Value Ref Range    Creatine Kinase 34 20 - 180 U/L   Magnesium    Collection Time: 05/09/22 11:03 AM    Specimen: Blood   Result Value Ref Range    Magnesium 2.0 1.6 - 2.6 mg/dL   BNP    Collection Time: 05/09/22 11:03 AM    Specimen: Blood   Result Value Ref Range    proBNP 55.9 0.0 - 450.0 pg/mL   Urinalysis With Microscopic If Indicated  (No Culture) - Urine, Clean Catch    Collection Time: 05/09/22 11:15 AM    Specimen: Urine, Clean Catch   Result Value Ref Range    Color, UA Yellow Yellow, Straw    Appearance, UA Clear Clear    pH, UA <=5.0 5.0 - 8.0    Specific Gravity, UA 1.029 1.001 - 1.030    Glucose, UA >=1000 mg/dL (3+) (A) Negative    Ketones, UA >=160 mg/dL (4+) (A) Negative    Bilirubin, UA Negative Negative    Blood, UA Small (1+) (A) Negative    Protein, UA >=300 mg/dL (3+) (A) Negative    Leuk Esterase, UA Negative Negative    Nitrite, UA Negative Negative    Urobilinogen, UA 0.2 E.U./dL 0.2 - 1.0 E.U./dL   Urinalysis, Microscopic Only - Urine, Clean Catch    Collection Time: 05/09/22 11:15 AM    Specimen: Urine, Clean Catch   Result Value Ref Range    RBC, UA 3-6 (A) None Seen, 0-2 /HPF    WBC, UA 3-5 (A) None Seen, 0-2 /HPF    Bacteria, UA 1+ (A) None Seen, Trace /HPF    Squamous Epithelial Cells, UA 7-12 (A) None Seen, 0-2 /HPF    Hyaline Casts, UA 21-30 0 - 6 /LPF    WBC Casts 7-12 None Seen /LPF    Granular Casts, UA 7-12 None Seen /LPF    Mucus, UA Small/1+ (A) None Seen, Trace /HPF    Methodology Manual Light Microscopy    POC Glucose Once    Collection Time: 05/09/22  3:48 PM    Specimen: Blood   Result Value Ref Range    Glucose 274 (H) 70 - 130 mg/dL   Blood Gas, Arterial With Co-Ox    Collection Time: 05/09/22  4:39 PM    Specimen: Arterial Blood   Result Value Ref Range    Site Right Radial     Dat's Test N/A     pH, Arterial 7.164 (C) 7.350 - 7.450 pH units    pCO2, Arterial 21.3 (L) 35.0 - 45.0 mm Hg    pO2, Arterial 106.0 83.0 - 108.0 mm Hg    HCO3, Arterial 7.6 (L) 20.0 - 26.0 mmol/L    Base Excess, Arterial -19.0 (L) 0.0 - 2.0 mmol/L    Hemoglobin, Blood Gas 14.6 14 - 18 g/dL    Hematocrit, Blood Gas 44.6 38.0 - 51.0 %    Oxyhemoglobin 97.5 94 - 99 %    Methemoglobin 0.40 0.00 - 1.50 %    Carboxyhemoglobin 0.8 0 - 2 %    CO2 Content 8.3 (L) 22 - 33 mmol/L    Temperature 37.0 C    Barometric Pressure for Blood Gas       Modality Room Air     FIO2 21 %    Ventilator Mode       Note      Notified Brandi RAYDAREN BOWER     Notified By [id number]     Notified Time 05/09/2022 16:41     pH, Temp Corrected 7.164 pH Units    pCO2, Temperature Corrected 21.3 (L) 35 - 45 mm Hg    pO2, Temperature Corrected 106 83 - 108 mm Hg   POC Glucose Once    Collection Time: 05/09/22  6:04 PM    Specimen: Blood   Result Value Ref Range    Glucose 242 (H) 70 - 130 mg/dL   Comprehensive Metabolic Panel    Collection Time: 05/09/22  6:35 PM    Specimen: Blood   Result Value Ref Range    Glucose 274 (H) 65 - 99 mg/dL    BUN 7 6 - 20 mg/dL    Creatinine 0.65 0.57 - 1.00 mg/dL    Sodium 135 (L) 136 - 145 mmol/L    Potassium 3.7 3.5 - 5.2 mmol/L    Chloride 107 98 - 107 mmol/L    CO2 8.0 (L) 22.0 - 29.0 mmol/L    Calcium 8.8 8.6 - 10.5 mg/dL    Total Protein 7.3 6.0 - 8.5 g/dL    Albumin 4.10 3.50 - 5.20 g/dL    ALT (SGPT) 21 1 - 33 U/L    AST (SGOT) 11 1 - 32 U/L    Alkaline Phosphatase 162 (H) 39 - 117 U/L    Total Bilirubin 0.3 0.0 - 1.2 mg/dL    Globulin 3.2 gm/dL    A/G Ratio 1.3 g/dL    BUN/Creatinine Ratio 10.8 7.0 - 25.0    Anion Gap 20.0 (H) 5.0 - 15.0 mmol/L    eGFR 115.7 >60.0 mL/min/1.73   Phosphorus    Collection Time: 05/09/22  6:35 PM    Specimen: Blood   Result Value Ref Range    Phosphorus 1.2 (C) 2.5 - 4.5 mg/dL   Osmolality, Serum    Collection Time: 05/09/22  6:35 PM    Specimen: Blood   Result Value Ref Range    Osmolality 296 (H) 275 - 295 mOsm/kg   Hemoglobin A1c    Collection Time: 05/09/22  6:35 PM    Specimen: Blood   Result Value Ref Range    Hemoglobin A1C 10.80 (H) 4.80 - 5.60 %   Magnesium    Collection Time: 05/09/22  6:35 PM    Specimen: Blood   Result Value Ref Range    Magnesium 1.9 1.6 - 2.6 mg/dL   Procalcitonin    Collection Time: 05/09/22  6:35 PM    Specimen: Blood   Result Value Ref Range    Procalcitonin 0.06 0.00 - 0.25 ng/mL   Calcium, Ionized    Collection Time: 05/09/22  6:35 PM    Specimen: Blood   Result Value Ref Range     Ionized Calcium 1.33 (H) 1.12 - 1.32 mmol/L   CBC Auto Differential    Collection Time: 05/09/22  6:35 PM    Specimen: Blood   Result Value Ref Range    WBC 10.28 3.40 - 10.80 10*3/mm3    RBC 4.86 3.77 - 5.28 10*6/mm3    Hemoglobin 14.4 12.0 - 15.9 g/dL    Hematocrit 42.3 34.0 - 46.6 %    MCV 87.0 79.0 - 97.0 fL    MCH 29.6 26.6 - 33.0 pg    MCHC 34.0 31.5 - 35.7 g/dL    RDW 12.9 12.3 - 15.4 %    RDW-SD 40.0 37.0 - 54.0 fl    MPV 10.0 6.0 - 12.0 fL    Platelets 375 140 - 450 10*3/mm3    Neutrophil % 83.4 (H) 42.7 - 76.0 %    Lymphocyte % 9.4 (L) 19.6 - 45.3 %    Monocyte % 5.8 5.0 - 12.0 %    Eosinophil % 0.3 0.3 - 6.2 %    Basophil % 0.6 0.0 - 1.5 %    Immature Grans % 0.5 0.0 - 0.5 %    Neutrophils, Absolute 8.57 (H) 1.70 - 7.00 10*3/mm3    Lymphocytes, Absolute 0.97 0.70 - 3.10 10*3/mm3    Monocytes, Absolute 0.60 0.10 - 0.90 10*3/mm3    Eosinophils, Absolute 0.03 0.00 - 0.40 10*3/mm3    Basophils, Absolute 0.06 0.00 - 0.20 10*3/mm3    Immature Grans, Absolute 0.05 0.00 - 0.05 10*3/mm3    nRBC 0.0 0.0 - 0.2 /100 WBC       If labs were ordered, I independently reviewed the results.        RADIOLOGY  XR Chest 1 View    Result Date: 5/9/2022  EXAM: XR CHEST 1 VW-  DATE OF EXAM: 5/9/2022 10:36 AM  INDICATION: Upper Abdominal Pain Triage Protocol.   COMPARISONS: None  FINDINGS:  No evidence of pneumonia, pulmonary edema or other acute pulmonary process. No evidence of pneumothorax or significant pleural effusion. No evidence of acute process of the heart or other mediastinal structures. Normal heart size.       Negative chest x-ray. No evidence of acute cardiopulmonary disease.  This report was finalized on 5/9/2022 10:59 AM by Scotty Vo.      CT Angiogram Chest, CT Abdomen Pelvis With Contrast    Result Date: 5/9/2022  DATE OF EXAM: 5/9/2022 2:42 PM  PROCEDURE: CT ABDOMEN PELVIS W CONTRAST-, CT ANGIOGRAM CHEST-  INDICATIONS: acute pain; post op gastric sleeve surgery  COMPARISON: No comparisons available.   TECHNIQUE: Contiguous axial imaging was obtained from the thoracic inlet through the upper abdomen following the intravenous administration of 95 mL of Isovue 370. Routine transaxial slices were obtained through the abdomen and pelvis after the intravenous administration of contrast. Reconstructed coronal and sagittal images were also obtained. Automated exposure control and iterative construction methods were used. Three-dimensional rotational MIP reconstruction of the pulmonary arterial vasculature was created at independent workstation.  The radiation dose reduction device was turned on for each scan per the ALARA (As Low as Reasonably Achievable) protocol.  FINDINGS:  CTA chest: Normal appearance of the pulmonary arteries without filling defect to suggest pulmonary embolism. Normal caliber of the main pulmonary artery. Normal appearance of the thoracic aorta with incidental note of 2 vessel aortic arch, a normal variant. Unremarkable appearance of the cardiac chambers. Normal heart size. No pericardial effusion. No significant coronary artery calcifications. No bulky or suspicious mediastinal or hilar lymph nodes. Unremarkable appearance of the thoracic esophagus. Homogeneous attenuation of the thyroid gland. The chest wall soft tissues are normal. No axillary lymphadenopathy. The trachea and mainstem bronchi are patent. The smaller peripheral airways are normal. The lungs are clear without focal consolidation or evidence of active infectious or inflammatory process. No lung mass or suspicious pulmonary nodule. No pleural effusion or pneumothorax. No acute or suspicious bony findings in the thorax.  Abdomen and pelvis: Normal appearance of the liver. Expected postoperative appearance of recent cholecystectomy; small linear region of hypodensity in the gallbladder fossa may reflect a small amount of nonorganized postoperative fluid or edema. Normal appearance of the bile ducts. Unremarkable appearance of the  spleen, pancreas, adrenal glands, kidneys, and bladder. The uterus is surgically absent. Unremarkable appearance of surgical change of sleeve gastrectomy; there is no conspicuous inflammatory change about the gastric sleeve. No evidence of intra-abdominal abscess or discrete/drainable intra-abdominal fluid collection. No free abdominopelvic fluid. No pneumoperitoneum. Hyperdense material is seen throughout the unremarkable appearing colon, likely previously ingested oral contrast. There may be minimal sigmoid colonic diverticulosis without evidence of diverticulitis. There is no evidence of bowel obstruction. The appendix is normal. Normal appearance of the vasculature. No lymphadenopathy. Scattered regions of periumbilical and right and left anterior abdominal wall subcutaneous fat stranding likely reflect recent laparoscopic approach surgery. No acute osseous findings.      No evidence of pulmonary embolism. No acute intrathoracic findings.  No acute abdominopelvic findings. Expected postoperative appearance of recent laparoscopic sleeve gastrectomy and cholecystectomy.  This report was finalized on 5/9/2022 3:25 PM by Cuauhtemoc Godoy MD.        PROCEDURES    Critical Care  Performed by: Nancy Guerra APRN  Authorized by: Bryon Miranda MD     Critical care provider statement:     Critical care time (minutes):  35    Critical care time was exclusive of:  Separately billable procedures and treating other patients    Critical care was necessary to treat or prevent imminent or life-threatening deterioration of the following conditions:  Dehydration and endocrine crisis    Critical care was time spent personally by me on the following activities:  Development of treatment plan with patient or surrogate, discussions with consultants, discussions with primary provider, evaluation of patient's response to treatment, examination of patient, interpretation of cardiac output measurements, obtaining history from  patient or surrogate, review of old charts, re-evaluation of patient's condition, pulse oximetry, ordering and review of radiographic studies and ordering and review of laboratory studies    I assumed direction of critical care for this patient from another provider in my specialty: no      Care discussed with: admitting provider          ECG 12 Lead   Preliminary Result             MEDICATIONS GIVEN IN ER    Medications   sodium chloride 0.9 % flush 10 mL (has no administration in time range)   sodium chloride 0.9 % flush 10 mL (has no administration in time range)   sodium chloride 0.9 % flush 10 mL (has no administration in time range)   dextrose (GLUTOSE) oral gel 15 g (has no administration in time range)   dextrose (D50W) (25 g/50 mL) IV injection 10-50 mL (has no administration in time range)   glucagon (human recombinant) (GLUCAGEN DIAGNOSTIC) injection 1 mg (has no administration in time range)   sodium chloride 0.9 % bolus (1,000 mL/hr Intravenous New Bag 5/9/22 0851)   sodium chloride 0.9 % infusion (has no administration in time range)   sodium chloride 0.9 % with KCl 20 mEq/L infusion (has no administration in time range)   sodium chloride 0.9 % with KCl 40 mEq/L infusion (has no administration in time range)   dextrose 5 % and sodium chloride 0.9 % infusion (has no administration in time range)   dextrose 5 % and sodium chloride 0.9 % with KCl 20 mEq/L infusion (has no administration in time range)   dextrose 5 % and sodium chloride 0.9 % with KCl 40 mEq/L infusion (has no administration in time range)   sodium chloride 0.45 % infusion (has no administration in time range)   sodium chloride 0.45 % with KCl 20 mEq/L infusion (has no administration in time range)   sodium chloride 0.45 % 1,000 mL with potassium chloride 40 mEq infusion (has no administration in time range)   dextrose 5 % and sodium chloride 0.45 % infusion (has no administration in time range)   dextrose 5 % and sodium chloride 0.45 %  with KCl 20 mEq/L infusion (has no administration in time range)   dextrose 5 % and sodium chloride 0.45 % with KCl 40 mEq/L infusion (has no administration in time range)   insulin regular 1 unit/mL in 0.9% sodium chloride (1.8 Units/hr Intravenous Currently Infusing 5/9/22 7727)   sodium chloride 0.9 % flush 10 mL (has no administration in time range)   sodium chloride 0.9 % flush 10 mL (has no administration in time range)   Enoxaparin Sodium (LOVENOX) syringe 40 mg (has no administration in time range)   potassium chloride 10 mEq in 100 mL IVPB (has no administration in time range)   Magnesium Sulfate 2 gram Bolus, followed by 8 gram infusion (total Mg dose 10 grams)- Mg less than or equal to 1mg/dL (has no administration in time range)     Or   Magnesium Sulfate 2 gram / 50mL Infusion (GIVE X 3 BAGS TO EQUAL 6GM TOTAL DOSE) - Mg 1.1 - 1.5 mg/dl (has no administration in time range)     Or   Magnesium Sulfate 4 gram infusion- Mg 1.6-1.9 mg/dL (has no administration in time range)   potassium phosphate 45 mmol in sodium chloride 0.9 % 500 mL infusion (has no administration in time range)     Or   potassium phosphate 30 mmol in sodium chloride 0.9 % 250 mL infusion (has no administration in time range)     Or   potassium phosphate 15 mmol in 0.9% sodium chloride 100 mL IVPB (has no administration in time range)     Or   sodium phosphates 45 mmol in sodium chloride 0.9 % 250 mL IVPB (has no administration in time range)     Or   sodium phosphates 30 mmol in sodium chloride 0.9 % 250 mL IVPB (has no administration in time range)     Or   sodium phosphates 15 mmol in sodium chloride 0.9 % 250 mL IVPB (has no administration in time range)   calcium gluconate 1 g in sodium chloride 0.9 % 100 mL IVPB (has no administration in time range)     And   calcium gluconate 6 g in sodium chloride 0.9 % 500 mL IVPB (has no administration in time range)   sodium chloride 0.9 % bolus 2,000 mL (0 mL Intravenous Stopped 5/9/22 3139)  "  HYDROmorphone (DILAUDID) injection 0.5 mg (0.5 mg Intravenous Given 5/9/22 1219)   ondansetron (ZOFRAN) injection 4 mg (4 mg Intravenous Given 5/9/22 1219)   diphenhydrAMINE (BENADRYL) injection 50 mg (50 mg Intravenous Given 5/9/22 1344)   iopamidol (ISOVUE-370) 76 % injection 100 mL (90 mL Intravenous Given 5/9/22 1455)   insulin regular (humuLIN R,novoLIN R) injection 8 Units (8 Units Intravenous Given 5/9/22 1549)   ondansetron (ZOFRAN) injection 4 mg (4 mg Intravenous Given 5/9/22 1642)           ED Course as of 05/09/22 1918   Mon May 09, 2022   1124 Hemoglobin(!): 16.3 [MS]   1124 Hematocrit(!): 47.2 [MS]   1124 WBC(!): 11.58 [MS]   1210 Ketones, UA(!): >=160 mg/dL (4+) [MS]   1210 Lipase(!): 119 [MS]   1210 Anion Gap(!): 27.0 [MS]   1340 Patient expressed concern about receiving contrast for her studies due to a recollection that her \"heart rate went really low\" on the occasion of a contrasted study in the remote past.  She has no history of anaphylaxis or respiratory distress or hives or angioedema after contrast.  Will give Benadryl.  The patient and family understand that their bariatric surgeon has advised no steroid use.  We will honor that concern. [MS]   1531 Beta-Hydroxybutyrate Quant(!): 6.715 [MS]   1917 Patient's work-up is remarkable for diabetic ketoacidosis with marked elevation in her anion gap with ABG showing pH of 7.1.  Patient's vital signs have improved she is less tachycardic.  She has no hypotension.  Her pain is been easily to improve.  CT images are negative for acute findings.  I discussed her case with hospitalist as well as intensivist and Dr. Juarez Willoughby accepts intensive care.  Patient and her  understand and concur with this inpatient plan [MS]      ED Course User Index  [MS] Nancy Guerra, SATHISH             AS OF 19:18 EDT VITALS:    BP - 116/66  HR - 109  TEMP - 97.7 °F (36.5 °C) (Oral)  O2 SATS - 96%                  DIAGNOSIS  Final diagnoses:   Diabetic " ketoacidosis without coma associated with type 1 diabetes mellitus (HCC)   H/O gastric sleeve         DISPOSITION    Admitted to ICU           Nancy Guerra, APRN  05/09/22 1918      Electronically signed by Bryon Miranda MD at 05/09/22 2004       Vital Signs (last day)     Date/Time Temp Temp src Pulse Resp BP Patient Position SpO2    05/10/22 0600 -- -- 92 16 112/74 Lying 99    05/10/22 0500 -- -- 90 -- 120/78 -- 100    05/10/22 0400 -- -- 90 18 110/67 Lying 99    05/10/22 0300 -- -- 85 -- 106/67 -- 99    05/10/22 0200 -- -- 90 16 109/72 Lying 100    05/10/22 0100 -- -- 94 -- 118/69 -- 97    05/10/22 0000 -- -- 99 16 117/68 Lying 96    05/09/22 2300 -- -- 93 -- 114/72 -- 99    05/09/22 2200 -- -- 97 18 114/70 Lying 99    05/09/22 2030 98.9 (37.2) Oral 106 18 134/80 -- 99    05/09/22 1800 -- -- 109 18 116/66 -- 96    05/09/22 1700 -- -- 109 -- 112/74 -- 97    05/09/22 1430 -- -- 110 -- 124/76 -- 99    05/09/22 13:45:02 -- -- 105 18 110/69 Lying 100    05/09/22 1230 -- -- 105 -- 127/77 -- 99    05/09/22 1016 97.7 (36.5) Oral 131 24 149/67 Sitting 99            Current Facility-Administered Medications   Medication Dose Route Frequency Provider Last Rate Last Admin   • calcium gluconate 1 g in sodium chloride 0.9 % 100 mL IVPB  1 g Intravenous PRN Kelby Willoughby MD        And   • calcium gluconate 6 g in sodium chloride 0.9 % 500 mL IVPB  6 g Intravenous PRN Kelby Willoughby MD       • dextrose (D50W) (25 g/50 mL) IV injection 10-50 mL  10-50 mL Intravenous Q15 Min PRN Kelby Willoughby MD       • dextrose (GLUTOSE) oral gel 15 g  15 g Oral Q15 Min PRN Kelby Willoughby MD       • dextrose 5 % and sodium chloride 0.45 % infusion  150 mL/hr Intravenous Continuous PRN Kelby Willoughby MD       • dextrose 5 % and sodium chloride 0.45 % with KCl 20 mEq/L infusion  150 mL/hr Intravenous Continuous PRN Kelby Willoughby  mL/hr at 05/09/22 2156 150 mL/hr at 05/09/22  2156   • dextrose 5 % and sodium chloride 0.45 % with KCl 40 mEq/L infusion  150 mL/hr Intravenous Continuous PRN Kelby Willoughby  mL/hr at 05/10/22 0553 150 mL/hr at 05/10/22 0553   • dextrose 5 % and sodium chloride 0.9 % infusion  150 mL/hr Intravenous Continuous PRN Kelby Willoughby MD       • dextrose 5 % and sodium chloride 0.9 % with KCl 20 mEq/L infusion  150 mL/hr Intravenous Continuous PRN Kelby Willoughby MD       • dextrose 5 % and sodium chloride 0.9 % with KCl 40 mEq/L infusion  150 mL/hr Intravenous Continuous PRN Kelby Willoughby MD       • Enoxaparin Sodium (LOVENOX) syringe 40 mg  40 mg Subcutaneous Q24H Kelby Willoughby MD       • glucagon (human recombinant) (GLUCAGEN DIAGNOSTIC) injection 1 mg  1 mg Intramuscular Q15 Min PRN Kelby Willoughby MD       • insulin regular 1 unit/mL in 0.9% sodium chloride  0-100 Units/hr Intravenous Titrated Kelby Willoughby MD 2.1 mL/hr at 05/10/22 0716 2.1 Units/hr at 05/10/22 0716   • Magnesium Sulfate 2 gram Bolus, followed by 8 gram infusion (total Mg dose 10 grams)- Mg less than or equal to 1mg/dL  2 g Intravenous PRN Kelby Willoughby MD        Or   • Magnesium Sulfate 2 gram / 50mL Infusion (GIVE X 3 BAGS TO EQUAL 6GM TOTAL DOSE) - Mg 1.1 - 1.5 mg/dl  2 g Intravenous PRN Kelby Willoughby MD        Or   • Magnesium Sulfate 4 gram infusion- Mg 1.6-1.9 mg/dL  4 g Intravenous PRN Kelby Willoughby MD       • ondansetron (ZOFRAN) injection 4 mg  4 mg Intravenous Q6H PRN Rustam Ruby APRN   4 mg at 05/09/22 2252   • oxyCODONE (ROXICODONE) immediate release tablet 5 mg  5 mg Oral Q4H PRN Rustam Ruby APRN   5 mg at 05/09/22 2252   • potassium chloride (MICRO-K) CR capsule 40 mEq  40 mEq Oral PRN Rustam Ruby APRN   40 mEq at 05/10/22 0402    Or   • potassium chloride 10 mEq in 100 mL IVPB  10 mEq Intravenous Q1H PRN Rustam Ruby, APRN       • potassium phosphate 45  mmol in sodium chloride 0.9 % 500 mL infusion  45 mmol Intravenous PRN Kelby Willoughby MD 62.5 mL/hr at 05/09/22 2043 45 mmol at 05/09/22 2043    Or   • potassium phosphate 30 mmol in sodium chloride 0.9 % 250 mL infusion  30 mmol Intravenous PRN Kelby Willoughby MD        Or   • potassium phosphate 15 mmol in 0.9% sodium chloride 100 mL IVPB  15 mmol Intravenous PRN Kelby Willoughby MD       • sodium chloride 0.45 % 1,000 mL with potassium chloride 40 mEq infusion  250 mL/hr Intravenous Continuous PRN Kelby Willoughby MD       • sodium chloride 0.45 % infusion  250 mL/hr Intravenous Continuous PRN Kelby Willoughby MD       • sodium chloride 0.45 % with KCl 20 mEq/L infusion  250 mL/hr Intravenous Continuous PRN Kelby Willoughby  mL/hr at 05/09/22 2137 250 mL/hr at 05/09/22 2137   • sodium chloride 0.9 % flush 10 mL  10 mL Intravenous PRBryon Frederick MD       • sodium chloride 0.9 % flush 10 mL  10 mL Intravenous Q12H Kelby Willoughby MD       • sodium chloride 0.9 % flush 10 mL  10 mL Intravenous PRN Kelby Willoughby MD       • sodium chloride 0.9 % infusion  250 mL/hr Intravenous Continuous PRN Kelby Willoughby MD       • sodium chloride 0.9 % with KCl 20 mEq/L infusion  250 mL/hr Intravenous Continuous PRN Kelby Willoughby MD       • sodium chloride 0.9 % with KCl 40 mEq/L infusion  250 mL/hr Intravenous Continuous PRN Kelby Willoughby MD           Lab Results (last 24 hours)     Procedure Component Value Units Date/Time    POC Glucose Once [[id number]]  (Normal) Collected: 05/10/22 0715    Specimen: Blood Updated: 05/10/22 0717     Glucose 130 mg/dL      Comment: Meter: [id number] : [id number] Inés John       POC Glucose Once [[id number]]  (Normal) Collected: 05/10/22 0657    Specimen: Blood Updated: 05/10/22 0658     Glucose 110 mg/dL      Comment: Meter: [id number] : [id number] Valeria Brown       POC Glucose Once  [[id number]]  (Normal) Collected: 05/10/22 0618    Specimen: Blood Updated: 05/10/22 0621     Glucose 92 mg/dL      Comment: Meter: [id number] : [id number] Conte Dora       POC Glucose Once [[id number]]  (Normal) Collected: 05/10/22 0559    Specimen: Blood Updated: 05/10/22 0600     Glucose 73 mg/dL      Comment: Meter: [id number] : [id number] Conte Dora       POC Glucose Once [[id number]]  (Normal) Collected: 05/10/22 0521    Specimen: Blood Updated: 05/10/22 0522     Glucose 100 mg/dL      Comment: Meter: [id number] : [id number] Conte Dora       Calcium, Ionized [[id number]]  (Normal) Collected: 05/10/22 0402    Specimen: Blood Updated: 05/10/22 0506     Ionized Calcium 1.30 mmol/L     Basic Metabolic Panel [[id number]]  (Abnormal) Collected: 05/10/22 0402    Specimen: Blood Updated: 05/10/22 0458     Glucose 139 mg/dL      BUN 4 mg/dL      Creatinine 0.52 mg/dL      Sodium 136 mmol/L      Potassium 3.0 mmol/L      Chloride 108 mmol/L      CO2 13.0 mmol/L      Calcium 8.6 mg/dL      BUN/Creatinine Ratio 7.7     Anion Gap 15.0 mmol/L      eGFR 122.1 mL/min/1.73      Comment: National Kidney Foundation and American Society of Nephrology (ASN) Task Force recommended calculation based on the Chronic Kidney Disease Epidemiology Collaboration (CKD-EPI) equation refit without adjustment for race.       Narrative:      GFR Normal >60  Chronic Kidney Disease <60  Kidney Failure <15      Phosphorus [[id number]]  (Abnormal) Collected: 05/10/22 0402    Specimen: Blood Updated: 05/10/22 0458     Phosphorus 2.0 mg/dL     Magnesium [[id number]]  (Normal) Collected: 05/10/22 0402    Specimen: Blood Updated: 05/10/22 0458     Magnesium 1.6 mg/dL     POC Glucose Once [[id number]]  (Abnormal) Collected: 05/10/22 0401    Specimen: Blood Updated: 05/10/22 0402     Glucose 135 mg/dL      Comment: Meter: [id number] : [id number] Conte Dora       Phosphorus [[id number]]  (Abnormal) Collected: 05/10/22 0209    Specimen:  Blood from Arm, Left Updated: 05/10/22 0326     Phosphorus 1.9 mg/dL     Basic Metabolic Panel [[id number]]  (Abnormal) Collected: 05/10/22 0209    Specimen: Blood from Arm, Left Updated: 05/10/22 0315     Glucose 217 mg/dL      BUN 5 mg/dL      Creatinine 0.50 mg/dL      Sodium 136 mmol/L      Potassium 3.2 mmol/L      Chloride 110 mmol/L      CO2 12.0 mmol/L      Calcium 8.4 mg/dL      BUN/Creatinine Ratio 10.0     Anion Gap 14.0 mmol/L      eGFR 123.3 mL/min/1.73      Comment: National Kidney Foundation and American Society of Nephrology (ASN) Task Force recommended calculation based on the Chronic Kidney Disease Epidemiology Collaboration (CKD-EPI) equation refit without adjustment for race.       Narrative:      GFR Normal >60  Chronic Kidney Disease <60  Kidney Failure <15      Magnesium [[id number]]  (Normal) Collected: 05/10/22 0209    Specimen: Blood from Arm, Left Updated: 05/10/22 0315     Magnesium 1.8 mg/dL     POC Glucose Once [[id number]]  (Abnormal) Collected: 05/10/22 0259    Specimen: Blood Updated: 05/10/22 0306     Glucose 185 mg/dL      Comment: Meter: [id number] : [id number] [employer]       CBC (No Diff) [[id number]]  (Normal) Collected: 05/10/22 0209    Specimen: Blood from Arm, Left Updated: 05/10/22 0302     WBC 6.79 10*3/mm3      RBC 4.57 10*6/mm3      Hemoglobin 13.5 g/dL      Hematocrit 39.2 %      MCV 85.8 fL      MCH 29.5 pg      MCHC 34.4 g/dL      RDW 13.4 %      RDW-SD 40.9 fl      MPV 10.6 fL      Platelets 299 10*3/mm3     POC Glucose Once [[id number]]  (Abnormal) Collected: 05/10/22 0156    Specimen: Blood Updated: 05/10/22 0158     Glucose 202 mg/dL      Comment: Meter: [id number] : [id number] [employer]       POC Glucose Once [[id number]]  (Abnormal) Collected: 05/10/22 0055    Specimen: Blood Updated: 05/10/22 0056     Glucose 217 mg/dL      Comment: Meter: [id number] : [id number] Inés John       POC Glucose Once [[id number]]  (Abnormal) Collected: 05/09/22 6711     Specimen: Blood Updated: 05/09/22 2355     Glucose 240 mg/dL      Comment: Meter: [id number] : [id number] Inés Linoa       POC Glucose Once [[id number]]  (Abnormal) Collected: 05/09/22 2245    Specimen: Blood Updated: 05/09/22 2246     Glucose 250 mg/dL      Comment: Meter: [id number] : [id number] Conte Dora       POC Glucose Once [[id number]]  (Abnormal) Collected: 05/09/22 2143    Specimen: Blood Updated: 05/09/22 2146     Glucose 246 mg/dL      Comment: Meter: [id number] : [id number] Vj Peterson       POC Glucose Once [[id number]]  (Abnormal) Collected: 05/09/22 2047    Specimen: Blood Updated: 05/09/22 2048     Glucose 265 mg/dL      Comment: Meter: [id number] : [id number] Rubi Clara       POC Glucose Once [[id number]]  (Abnormal) Collected: 05/09/22 1934    Specimen: Blood Updated: 05/09/22 1935     Glucose 223 mg/dL      Comment: Meter: [id number] : [id number] Rubi Elizabeth       COVID PRE-OP / PRE-PROCEDURE SCREENING ORDER (NO ISOLATION) - Swab, Nasopharynx [[id number]]  (Normal) Collected: 05/09/22 1836    Specimen: Swab from Nasopharynx Updated: 05/09/22 1933    Narrative:      The following orders were created for panel order COVID PRE-OP / PRE-PROCEDURE SCREENING ORDER (NO ISOLATION) - Swab, Nasopharynx.  Procedure                               Abnormality         Status                     ---------                               -----------         ------                     COVID-19, FLU A/B, RSV P...[[id number]]  Normal              Final result                 Please view results for these tests on the individual orders.    COVID-19, FLU A/B, RSV PCR - Swab, Nasopharynx [[id number]]  (Normal) Collected: 05/09/22 1836    Specimen: Swab from Nasopharynx Updated: 05/09/22 1933     COVID19 Not Detected     Influenza A PCR Not Detected     Influenza B PCR Not Detected     RSV, PCR Not Detected    Narrative:      Fact sheet for providers: https://www.fda.gov/media/017312/download   "  Fact sheet for patients: https://www.fda.gov/media/241043/download    Test performed by PCR.    Phosphorus [[id number]]  (Abnormal) Collected: 05/09/22 1835    Specimen: Blood Updated: 05/09/22 1917     Phosphorus 1.2 mg/dL     Procalcitonin [[id number]]  (Normal) Collected: 05/09/22 1835    Specimen: Blood Updated: 05/09/22 1914     Procalcitonin 0.06 ng/mL     Narrative:      As a Marker for Sepsis (Non-Neonates):    1. <0.5 ng/mL represents a low risk of severe sepsis and/or septic shock.  2. >2 ng/mL represents a high risk of severe sepsis and/or septic shock.    As a Marker for Lower Respiratory Tract Infections that require antibiotic therapy:    PCT on Admission    Antibiotic Therapy       6-12 Hrs later    >0.5                Strongly Recommended  >0.25 - <0.5        Recommended   0.1 - 0.25          Discouraged              Remeasure/reassess PCT  <0.1                Strongly Discouraged     Remeasure/reassess PCT    As 28 day mortality risk marker: \"Change in Procalcitonin Result\" (>80% or <=80%) if Day 0 (or Day 1) and Day 4 values are available. Refer to http://[email or web address]-pct-calculator.com    Change in PCT <=80%  A decrease of PCT levels below or equal to 80% defines a positive change in PCT test result representing a higher risk for 28-day all-cause mortality of patients diagnosed with severe sepsis for septic shock.    Change in PCT >80%  A decrease of PCT levels of more than 80% defines a negative change in PCT result representing a lower risk for 28-day all-cause mortality of patients diagnosed with severe sepsis or septic shock.       Osmolality, Serum [[id number]]  (Abnormal) Collected: 05/09/22 1835    Specimen: Blood Updated: 05/09/22 1914     Osmolality 296 mOsm/kg     Comprehensive Metabolic Panel [[id number]]  (Abnormal) Collected: 05/09/22 1835    Specimen: Blood Updated: 05/09/22 1910     Glucose 274 mg/dL      BUN 7 mg/dL      Creatinine 0.65 mg/dL      Sodium 135 mmol/L      Potassium 3.7 " mmol/L      Comment: Slight hemolysis detected by analyzer. Results may be affected.        Chloride 107 mmol/L      CO2 8.0 mmol/L      Calcium 8.8 mg/dL      Total Protein 7.3 g/dL      Albumin 4.10 g/dL      ALT (SGPT) 21 U/L      AST (SGOT) 11 U/L      Alkaline Phosphatase 162 U/L      Total Bilirubin 0.3 mg/dL      Globulin 3.2 gm/dL      Comment: Calculated Result        A/G Ratio 1.3 g/dL      BUN/Creatinine Ratio 10.8     Anion Gap 20.0 mmol/L      eGFR 115.7 mL/min/1.73      Comment: National Kidney Foundation and American Society of Nephrology (ASN) Task Force recommended calculation based on the Chronic Kidney Disease Epidemiology Collaboration (CKD-EPI) equation refit without adjustment for race.       Narrative:      GFR Normal >60  Chronic Kidney Disease <60  Kidney Failure <15      Magnesium [[id number]]  (Normal) Collected: 05/09/22 1835    Specimen: Blood Updated: 05/09/22 1909     Magnesium 1.9 mg/dL     Hemoglobin A1c [[id number]]  (Abnormal) Collected: 05/09/22 1835    Specimen: Blood Updated: 05/09/22 1901     Hemoglobin A1C 10.80 %     Narrative:      Hemoglobin A1C Ranges:    Increased Risk for Diabetes  5.7% to 6.4%  Diabetes                     >= 6.5%  Diabetic Goal                < 7.0%    Calcium, Ionized [[id number]]  (Abnormal) Collected: 05/09/22 1835    Specimen: Blood Updated: 05/09/22 1901     Ionized Calcium 1.33 mmol/L     CBC & Differential [[id number]]  (Abnormal) Collected: 05/09/22 1835    Specimen: Blood Updated: 05/09/22 1847    Narrative:      The following orders were created for panel order CBC & Differential.  Procedure                               Abnormality         Status                     ---------                               -----------         ------                     CBC Auto Differential[[id number]]        Abnormal            Final result                 Please view results for these tests on the individual orders.    CBC Auto Differential [[id number]]   (Abnormal) Collected: 05/09/22 1835    Specimen: Blood Updated: 05/09/22 1847     WBC 10.28 10*3/mm3      RBC 4.86 10*6/mm3      Hemoglobin 14.4 g/dL      Hematocrit 42.3 %      MCV 87.0 fL      MCH 29.6 pg      MCHC 34.0 g/dL      RDW 12.9 %      RDW-SD 40.0 fl      MPV 10.0 fL      Platelets 375 10*3/mm3      Neutrophil % 83.4 %      Lymphocyte % 9.4 %      Monocyte % 5.8 %      Eosinophil % 0.3 %      Basophil % 0.6 %      Immature Grans % 0.5 %      Neutrophils, Absolute 8.57 10*3/mm3      Lymphocytes, Absolute 0.97 10*3/mm3      Monocytes, Absolute 0.60 10*3/mm3      Eosinophils, Absolute 0.03 10*3/mm3      Basophils, Absolute 0.06 10*3/mm3      Immature Grans, Absolute 0.05 10*3/mm3      nRBC 0.0 /100 WBC     POC Glucose Once [[id number]]  (Abnormal) Collected: 05/09/22 1804    Specimen: Blood Updated: 05/09/22 1806     Glucose 242 mg/dL      Comment: Physician Notified Meter: [id number] : [id number] Rohan Hodge       Blood Gas, Arterial With Co-Ox [[id number]]  (Abnormal) Collected: 05/09/22 1639    Specimen: Arterial Blood Updated: 05/09/22 1641     Site Right Radial     Dat's Test N/A     pH, Arterial 7.164 pH units      Comment: 85 Value below critical limit        pCO2, Arterial 21.3 mm Hg      Comment: 84 Value below reference range        pO2, Arterial 106.0 mm Hg      HCO3, Arterial 7.6 mmol/L      Base Excess, Arterial -19.0 mmol/L      Hemoglobin, Blood Gas 14.6 g/dL      Hematocrit, Blood Gas 44.6 %      Oxyhemoglobin 97.5 %      Methemoglobin 0.40 %      Carboxyhemoglobin 0.8 %      CO2 Content 8.3 mmol/L      Temperature 37.0 C      Barometric Pressure for Blood Gas --     Comment: N/A        Modality Room Air     FIO2 21 %      Ventilator Mode       Comment: Meter: [id number]     :  [id number]        Note --     Notified Brandi BOWER     Notified By [id number]     Notified Time 05/09/2022 16:41     pH, Temp Corrected 7.164 pH Units      pCO2, Temperature Corrected 21.3 mm Hg       pO2, Temperature Corrected 106 mm Hg     POC Glucose Once [[id number]]  (Abnormal) Collected: 05/09/22 1548    Specimen: Blood Updated: 05/09/22 1550     Glucose 274 mg/dL      Comment: Meter: [id number] : [id number] Rubi Elizabeth       Beta Hydroxybutyrate Quantitative [[id number]]  (Abnormal) Collected: 05/09/22 1103    Specimen: Blood Updated: 05/09/22 1518     Beta-Hydroxybutyrate Quant 6.715 mmol/L     Narrative:      In the assessment of possible diabetic ketoacidosis, the test should be interpreted along with other clinical and laboratory findings.  A level greater than 1 mmol/L should require further evaluation and levels of more than 3 mmol/L require immediate medical review.    Peel Draw [[id number]] Collected: 05/09/22 1103    Specimen: Blood Updated: 05/09/22 1518    Narrative:      The following orders were created for panel order Peel Draw.  Procedure                               Abnormality         Status                     ---------                               -----------         ------                     Green Top (Gel)[[id number]]                                  Final result               Lavender Top[[id number]]                                     Final result               Gold Top - SST[[id number]]                                   Final result               Sagastume Top[[id number]]                                         Final result               Light Blue Top[[id number]]                                   Final result                 Please view results for these tests on the individual orders.    Gray Top [[id number]] Collected: 05/09/22 1103    Specimen: Blood Updated: 05/09/22 1518     Extra Tube Hold for add-ons.     Comment: Auto resulted.       Blood Culture - Blood, Arm, Left [[id number]] Collected: 05/09/22 1220    Specimen: Blood from Arm, Left Updated: 05/09/22 1252    Blood Culture - Blood, Arm, Left [[id number]] Collected: 05/09/22 1200    Specimen: Blood from Arm, Left Updated:  05/09/22 1251    Gold Top - SST [[id number]] Collected: 05/09/22 1103    Specimen: Blood Updated: 05/09/22 1218     Extra Tube Hold for add-ons.     Comment: Auto resulted.       Green Top (Gel) [[id number]] Collected: 05/09/22 1103    Specimen: Blood Updated: 05/09/22 1218     Extra Tube Hold for add-ons.     Comment: Auto resulted.       Lavender Top [[id number]] Collected: 05/09/22 1103    Specimen: Blood Updated: 05/09/22 1218     Extra Tube hold for add-on     Comment: Auto resulted       Light Blue Top [[id number]] Collected: 05/09/22 1103    Specimen: Blood Updated: 05/09/22 1218     Extra Tube hold for add-on     Comment: Auto resulted       CK [[id number]]  (Normal) Collected: 05/09/22 1103    Specimen: Blood Updated: 05/09/22 1208     Creatine Kinase 34 U/L     Magnesium [[id number]]  (Normal) Collected: 05/09/22 1103    Specimen: Blood Updated: 05/09/22 1208     Magnesium 2.0 mg/dL     Urinalysis, Microscopic Only - Urine, Clean Catch [[id number]]  (Abnormal) Collected: 05/09/22 1115    Specimen: Urine, Clean Catch Updated: 05/09/22 1205     RBC, UA 3-6 /HPF      WBC, UA 3-5 /HPF      Bacteria, UA 1+ /HPF      Squamous Epithelial Cells, UA 7-12 /HPF      Hyaline Casts, UA 21-30 /LPF      WBC Casts 7-12 /LPF      Granular Casts, UA 7-12 /LPF      Mucus, UA Small/1+ /HPF      Methodology Manual Light Microscopy    Urinalysis With Microscopic If Indicated (No Culture) - Urine, Clean Catch [[id number]]  (Abnormal) Collected: 05/09/22 1115    Specimen: Urine, Clean Catch Updated: 05/09/22 1205     Color, UA Yellow     Appearance, UA Clear     pH, UA <=5.0     Specific Gravity, UA 1.029     Glucose, UA >=1000 mg/dL (3+)     Ketones, UA >=160 mg/dL (4+)     Bilirubin, UA Negative     Blood, UA Small (1+)     Protein, UA >=300 mg/dL (3+)     Leuk Esterase, UA Negative     Nitrite, UA Negative     Urobilinogen, UA 0.2 E.U./dL    BNP [[id number]]  (Normal) Collected: 05/09/22 1103    Specimen: Blood Updated: 05/09/22 1206      proBNP 55.9 pg/mL     Narrative:      Among patients with dyspnea, NT-proBNP is highly sensitive for the detection of acute congestive heart failure. In addition NT-proBNP of <300 pg/ml effectively rules out acute congestive heart failure with 99% negative predictive value.    Results may be falsely decreased if patient taking Biotin.      Comprehensive Metabolic Panel [[id number]]  (Abnormal) Collected: 05/09/22 1103    Specimen: Blood Updated: 05/09/22 1144     Glucose 379 mg/dL      BUN 13 mg/dL      Creatinine 0.89 mg/dL      Sodium 129 mmol/L      Potassium 3.7 mmol/L      Chloride 96 mmol/L      CO2 6.0 mmol/L      Calcium 9.3 mg/dL      Total Protein 8.6 g/dL      Albumin 4.30 g/dL      ALT (SGPT) 27 U/L      AST (SGOT) 9 U/L      Alkaline Phosphatase 199 U/L      Total Bilirubin 0.3 mg/dL      Globulin 4.3 gm/dL      Comment: Calculated Result        A/G Ratio 1.0 g/dL      BUN/Creatinine Ratio 14.6     Anion Gap 27.0 mmol/L      eGFR 85.2 mL/min/1.73      Comment: National Kidney Foundation and American Society of Nephrology (ASN) Task Force recommended calculation based on the Chronic Kidney Disease Epidemiology Collaboration (CKD-EPI) equation refit without adjustment for race.       Narrative:      GFR Normal >60  Chronic Kidney Disease <60  Kidney Failure <15      Lactic Acid, Plasma [[id number]]  (Normal) Collected: 05/09/22 1103    Specimen: Blood Updated: 05/09/22 1143     Lactate 1.4 mmol/L      Comment: Falsely depressed results may occur on samples drawn from patients receiving N-Acetylcysteine (NAC) or Metamizole.       Lipase [[id number]]  (Abnormal) Collected: 05/09/22 1103    Specimen: Blood Updated: 05/09/22 1139     Lipase 119 U/L     Troponin [[id number]]  (Normal) Collected: 05/09/22 1103    Specimen: Blood Updated: 05/09/22 1139     Troponin T <0.010 ng/mL     Narrative:      Troponin T Reference Range:  <= 0.03 ng/mL-   Negative for AMI  >0.03 ng/mL-     Abnormal for myocardial necrosis.   Clinicians would have to utilize clinical acumen, EKG, Troponin and serial changes to determine if it is an Acute Myocardial Infarction or myocardial injury due to an underlying chronic condition.       Results may be falsely decreased if patient taking Biotin.      CBC & Differential [[id number]]  (Abnormal) Collected: 05/09/22 1103    Specimen: Blood Updated: 05/09/22 1122    Narrative:      The following orders were created for panel order CBC & Differential.  Procedure                               Abnormality         Status                     ---------                               -----------         ------                     CBC Auto Differential[[id number]]        Abnormal            Final result                 Please view results for these tests on the individual orders.    CBC Auto Differential [[id number]]  (Abnormal) Collected: 05/09/22 1103    Specimen: Blood Updated: 05/09/22 1122     WBC 11.58 10*3/mm3      RBC 5.54 10*6/mm3      Hemoglobin 16.3 g/dL      Hematocrit 47.2 %      MCV 85.2 fL      MCH 29.4 pg      MCHC 34.5 g/dL      RDW 13.0 %      RDW-SD 39.3 fl      MPV 9.9 fL      Platelets 426 10*3/mm3      Neutrophil % 85.5 %      Lymphocyte % 7.9 %      Monocyte % 5.4 %      Eosinophil % 0.2 %      Basophil % 0.5 %      Immature Grans % 0.5 %      Neutrophils, Absolute 9.89 10*3/mm3      Lymphocytes, Absolute 0.92 10*3/mm3      Monocytes, Absolute 0.63 10*3/mm3      Eosinophils, Absolute 0.02 10*3/mm3      Basophils, Absolute 0.06 10*3/mm3      Immature Grans, Absolute 0.06 10*3/mm3      nRBC 0.0 /100 WBC         Imaging Results (Last 24 Hours)     Procedure Component Value Units Date/Time    CT Angiogram Chest [[id number]] Collected: 05/09/22 1507     Updated: 05/09/22 1528    Narrative:      DATE OF EXAM: 5/9/2022 2:42 PM     PROCEDURE: CT ABDOMEN PELVIS W CONTRAST-, CT ANGIOGRAM CHEST-     INDICATIONS: acute pain; post op gastric sleeve surgery     COMPARISON: No comparisons available.      TECHNIQUE: Contiguous axial imaging was obtained from the thoracic inlet  through the upper abdomen following the intravenous administration of 95  mL of Isovue 370. Routine transaxial slices were obtained through the  abdomen and pelvis after the intravenous administration of contrast.  Reconstructed coronal and sagittal images were also obtained. Automated  exposure control and iterative construction methods were used.  Three-dimensional rotational MIP reconstruction of the pulmonary  arterial vasculature was created at independent workstation.     The radiation dose reduction device was turned on for each scan per the  ALARA (As Low as Reasonably Achievable) protocol.     FINDINGS:     CTA chest:  Normal appearance of the pulmonary arteries without filling defect to  suggest pulmonary embolism. Normal caliber of the main pulmonary artery.  Normal appearance of the thoracic aorta with incidental note of 2 vessel  aortic arch, a normal variant. Unremarkable appearance of the cardiac  chambers. Normal heart size. No pericardial effusion. No significant  coronary artery calcifications. No bulky or suspicious mediastinal or  hilar lymph nodes. Unremarkable appearance of the thoracic esophagus.  Homogeneous attenuation of the thyroid gland. The chest wall soft  tissues are normal. No axillary lymphadenopathy. The trachea and  mainstem bronchi are patent. The smaller peripheral airways are normal.  The lungs are clear without focal consolidation or evidence of active  infectious or inflammatory process. No lung mass or suspicious pulmonary  nodule. No pleural effusion or pneumothorax. No acute or suspicious bony  findings in the thorax.     Abdomen and pelvis:  Normal appearance of the liver. Expected postoperative appearance of  recent cholecystectomy; small linear region of hypodensity in the  gallbladder fossa may reflect a small amount of nonorganized  postoperative fluid or edema. Normal appearance of the bile  ducts.  Unremarkable appearance of the spleen, pancreas, adrenal glands,  kidneys, and bladder. The uterus is surgically absent. Unremarkable  appearance of surgical change of sleeve gastrectomy; there is no  conspicuous inflammatory change about the gastric sleeve. No evidence of  intra-abdominal abscess or discrete/drainable intra-abdominal fluid  collection. No free abdominopelvic fluid. No pneumoperitoneum.  Hyperdense material is seen throughout the unremarkable appearing colon,  likely previously ingested oral contrast. There may be minimal sigmoid  colonic diverticulosis without evidence of diverticulitis. There is no  evidence of bowel obstruction. The appendix is normal. Normal appearance  of the vasculature. No lymphadenopathy. Scattered regions of  periumbilical and right and left anterior abdominal wall subcutaneous  fat stranding likely reflect recent laparoscopic approach surgery. No  acute osseous findings.       Impression:      No evidence of pulmonary embolism. No acute intrathoracic findings.     No acute abdominopelvic findings. Expected postoperative appearance of  recent laparoscopic sleeve gastrectomy and cholecystectomy.     This report was finalized on 5/9/2022 3:25 PM by Cuauhtemoc Godoy MD.       CT Abdomen Pelvis With Contrast [[id number]] Collected: 05/09/22 1507     Updated: 05/09/22 1528    Narrative:      DATE OF EXAM: 5/9/2022 2:42 PM     PROCEDURE: CT ABDOMEN PELVIS W CONTRAST-, CT ANGIOGRAM CHEST-     INDICATIONS: acute pain; post op gastric sleeve surgery     COMPARISON: No comparisons available.     TECHNIQUE: Contiguous axial imaging was obtained from the thoracic inlet  through the upper abdomen following the intravenous administration of 95  mL of Isovue 370. Routine transaxial slices were obtained through the  abdomen and pelvis after the intravenous administration of contrast.  Reconstructed coronal and sagittal images were also obtained. Automated  exposure control and  iterative construction methods were used.  Three-dimensional rotational MIP reconstruction of the pulmonary  arterial vasculature was created at independent workstation.     The radiation dose reduction device was turned on for each scan per the  ALARA (As Low as Reasonably Achievable) protocol.     FINDINGS:     CTA chest:  Normal appearance of the pulmonary arteries without filling defect to  suggest pulmonary embolism. Normal caliber of the main pulmonary artery.  Normal appearance of the thoracic aorta with incidental note of 2 vessel  aortic arch, a normal variant. Unremarkable appearance of the cardiac  chambers. Normal heart size. No pericardial effusion. No significant  coronary artery calcifications. No bulky or suspicious mediastinal or  hilar lymph nodes. Unremarkable appearance of the thoracic esophagus.  Homogeneous attenuation of the thyroid gland. The chest wall soft  tissues are normal. No axillary lymphadenopathy. The trachea and  mainstem bronchi are patent. The smaller peripheral airways are normal.  The lungs are clear without focal consolidation or evidence of active  infectious or inflammatory process. No lung mass or suspicious pulmonary  nodule. No pleural effusion or pneumothorax. No acute or suspicious bony  findings in the thorax.     Abdomen and pelvis:  Normal appearance of the liver. Expected postoperative appearance of  recent cholecystectomy; small linear region of hypodensity in the  gallbladder fossa may reflect a small amount of nonorganized  postoperative fluid or edema. Normal appearance of the bile ducts.  Unremarkable appearance of the spleen, pancreas, adrenal glands,  kidneys, and bladder. The uterus is surgically absent. Unremarkable  appearance of surgical change of sleeve gastrectomy; there is no  conspicuous inflammatory change about the gastric sleeve. No evidence of  intra-abdominal abscess or discrete/drainable intra-abdominal fluid  collection. No free abdominopelvic  fluid. No pneumoperitoneum.  Hyperdense material is seen throughout the unremarkable appearing colon,  likely previously ingested oral contrast. There may be minimal sigmoid  colonic diverticulosis without evidence of diverticulitis. There is no  evidence of bowel obstruction. The appendix is normal. Normal appearance  of the vasculature. No lymphadenopathy. Scattered regions of  periumbilical and right and left anterior abdominal wall subcutaneous  fat stranding likely reflect recent laparoscopic approach surgery. No  acute osseous findings.       Impression:      No evidence of pulmonary embolism. No acute intrathoracic findings.     No acute abdominopelvic findings. Expected postoperative appearance of  recent laparoscopic sleeve gastrectomy and cholecystectomy.     This report was finalized on 5/9/2022 3:25 PM by Cuauhtemoc Godoy MD.       XR Chest 1 View [[id number]] Collected: 05/09/22 1058     Updated: 05/09/22 1102    Narrative:      EXAM: XR CHEST 1 VW-     DATE OF EXAM: 5/9/2022 10:36 AM     INDICATION: Upper Abdominal Pain Triage Protocol.       COMPARISONS: None      FINDINGS:     No evidence of pneumonia, pulmonary edema or other acute pulmonary  process. No evidence of pneumothorax or significant pleural effusion. No  evidence of acute process of the heart or other mediastinal structures.  Normal heart size.        Impression:         Negative chest x-ray. No evidence of acute cardiopulmonary disease.     This report was finalized on 5/9/2022 10:59 AM by Scotty Vo.                     Physician Progress Notes (last 24 hours)  Notes from 05/09/22 0811 through 05/10/22 0811   No notes of this type exist for this encounter.         Consult Notes (last 24 hours)  Notes from 05/09/22 0811 through 05/10/22 0811   No notes of this type exist for this encounter.

## 2022-05-10 NOTE — OUTREACH NOTE
General Surgery Week 2 Survey    Flowsheet Row Responses   Week 2 attempt successful? No   Unsuccessful attempts Attempt 2   Revoke Readmitted          VIBHA OSORIO - Registered Nurse

## 2022-05-10 NOTE — PROGRESS NOTES
Clinical Nutrition     Reason for Visit: MDR, Follow-up protocol, Need for education    Patient Name: Rain Barreto  YOB: 1985  MRN: [id number]  Date of Encounter: 05/10/22 11:40 EDT  Admission date: 2022    Nutrition Assessment   Admission Problem List:    DKA    Morbid exogenous obesity (S/P Lap Sleeve Gastrec jaylyn and Lap Choly 2022)    UTI (urinary tract infection)    Proteinuria      PMH:   She  has a past medical history of Anxiety, Depression, Diabetes mellitus (HCC), Diverticulosis, Dyspepsia, Gallstones, Heartburn, HLD (hyperlipidemia), Hypertension, Kidney infection, Kidney stone, Migraine, MRSA carrier,  (normal spontaneous vaginal delivery), PONV (postoperative nausea and vomiting), Tattoos, Wears dentures, and Wheezing.  PSxH:  She  has a past surgical history that includes  section (); d & c hysteroscopy endometrial ablation (); Tubal ligation (); Teeth Extraction; Esophagogastroduodenoscopy (N/A, 2019); Colonoscopy (N/A, 04/15/2019); Total abdominal hysterectomy ();  section ();  section (); Gastric Sleeve (N/A, 2022); Esophagogastroduodenoscopy (N/A, 2022); and Cholecystectomy (N/A, 2022).    Substance history: Tobacco remote    Reported/Observed/Food/Nutrition Related History:     Pt resting in bed, on room air, feels a bit dizzy, is hungry, no n/v at present, has only had 1 bm since her surgery, reports that she was on a stage 3 bariatric diet at home, was able to eat some c. cheese tuna, cheese, pudding, only able to do about 1-2 premier shakes per day, states that she has been having problems swallowing since surgery    Anthropometrics   Height: 61in  Weight: 177lb  BMI: 32  BMI classification: Obese Class I: 30-34.9kg/m2     ~20lb wt loss since surgery           Last 15 Recorded Weights  View Complete Flowsheet  Weight Weight (kg) Weight (lbs) Weight Method VISIT REPORT    5/9/2022 78.019 kg 172 lb Stated Report   5/9/2022 78.245 kg 172 lb 8 oz - Report   4/28/2022 95.709 kg 211 lb Bed scale -   4/25/2022 87.317 kg 192 lb 8 oz - -   3/1/2022 87.317 kg 192 lb 8 oz - -   8/26/2021 87.454 kg 192 lb 12.8 oz - Report   7/30/2021 87.091 kg 192 lb Stated -   6/29/2021 87.091 kg 192 lb - Report   4/11/2019 85.276 kg 188 lb Stated -   3/19/2019 85.276 kg 188 lb Stated -   3/12/2019 85.276 kg 188 lb - Report   4/1/2016 78.93 kg 174 lb 0.2 oz - -         Labs reviewed   Results from last 7 days   Lab Units 05/10/22  0812 05/10/22  0209 05/09/22  1835   SODIUM mmol/L 135*   < > 135*   POTASSIUM mmol/L 3.6   < > 3.7   CHLORIDE mmol/L 108*   < > 107   CO2 mmol/L 13.0*   < > 8.0*   BUN mg/dL 4*   < > 7   CREATININE mg/dL 0.47*   < > 0.65   CALCIUM mg/dL 8.8   < > 8.8   BILIRUBIN mg/dL  --   --  0.3   ALK PHOS U/L  --   --  162*   ALT (SGPT) U/L  --   --  21   AST (SGOT) U/L  --   --  11   GLUCOSE mg/dL 161*   < > 274*    < > = values in this interval not displayed.       Results from last 7 days   Lab Units 05/10/22  1053 05/10/22  0940 05/10/22  0830 05/10/22  0715 05/10/22  0657 05/10/22  0618   GLUCOSE mg/dL 140* 150* 165* 130 110 92     Lab Results   Lab Value Date/Time    HGBA1C 10.80 (H) 05/09/2022 1835    HGBA1C 11.60 (H) 04/26/2022 1258       Medications reviewed   Pertinent:lovenox, thiamine, insulin. D%LR@100ml/hr    Intake/Ouptut 24 hrs (7:00AM - 6:59 AM)     Intake & Output (last day)       05/09 0701  05/10 0700 05/10 0701  05/11 0700    P.O. 400     I.V. (mL/kg) 1205 (15.4) 424.7 (5.4)    IV Piggyback 1000     Total Intake(mL/kg) 2605 (33.4) 424.7 (5.4)    Urine (mL/kg/hr) 2050 725 (2)    Total Output 2050 725    Net +555 -300.3                     GI:abdomen soft, tender    SKIN: surgical site    Current Nutrition Prescription     PO: Diet Bariatric; Stage 3 Full Liquid Sugar Free + Some Soft; Consistent Carbohydrate     Intake: no data, diet just ordered    Nutrition Diagnosis    Date: 5-10-22  Problem Food and nutrition knowledge deficit   Etiology Uncontrolled DM   Signs/Symptoms Ha1c 10.8     Problem Altered GI function   Etiology s/p Sleeve Gastrectomy   Signs/Symptoms N/V,Abdominal pain     Nutrition Intervention   1.  Follow treatment progress, Advised available snacks, Interview for preferences, Menu provided, Menu adjusted, Supplement provided, Education provided    Premier Protein 3x/day    DM Education Provided    Suggest Resume Bariatric MVI's, minerals when feasible    Pt reports difficulty swallowing since surgery, ? reflux, ? need pepcid/ protonix    Consider SLP eval if pt continues to have difficulty swallowing    Goal:   General: Nutrition support treatment  PO: Establish PO    Additional goals: Education    Monitoring/Evaluation:   Per protocol, I&O, PO intake, Supplement intake, Pertinent labs, Weight, Skin status, GI status, Symptoms, Swallow function    Lulu Marin RD  Time Spent: 45min

## 2022-05-10 NOTE — PLAN OF CARE
Goal Outcome Evaluation:      Patient VSS. Afebrile. Continues on RA with PO2 greater than 95%. C/O Intermittent nausea. Laps sites Clean, dry, intact. Continuous Insulin gtt. Continue to monitor.

## 2022-05-10 NOTE — CASE MANAGEMENT/SOCIAL WORK
Discharge Planning Assessment  [facility]     Patient Name: Rain Barreto  MRN: [id number]  Today's Date: 5/9/2022    Admit Date: 5/9/2022     Discharge Needs Assessment     Row Name 05/09/22 2122       Living Environment    People in Home spouse    Name(s) of People in Home Carlos Alberto Barreto    Current Living Arrangements home    Potentially Unsafe Housing Conditions unable to assess    Primary Care Provided by self    Provides Primary Care For no one    Family Caregiver if Needed spouse    Family Caregiver Names Carlos Alberto Barreto    Quality of Family Relationships unable to assess    Able to Return to Prior Arrangements yes       Resource/Environmental Concerns    Resource/Environmental Concerns none    Transportation Concerns none       Transition Planning    Patient/Family Anticipates Transition to home    Patient/Family Anticipated Services at Transition none    Transportation Anticipated family or friend will provide       Discharge Needs Assessment    Readmission Within the Last 30 Days current reason for admission unrelated to previous admission    Equipment Currently Used at Home none    Concerns to be Addressed no discharge needs identified    Equipment Needed After Discharge other (see comments)  TBD    Discharge Facility/Level of Care Needs other (see comments)  TBD               Discharge Plan     Row Name 05/09/22 2125       Plan    Plan IDP    Plan Comments Pt. was recently d/c’d from this hospital on 5/2/22. Per chart, pt. lives in [facility] with her spouse. Pt.’s PCP is SATHISH Muñoz. Pt.’s insurance is [facility]. Pt. is independent with all ADL’s/IADL’s. No DME, O2, or HH services currently. Pt. has transportation back home when she is medically ready to d/c. Pt.’s goal is to return home. CM will continue to follow throughout pt.’s stay.    Final Discharge Disposition Code 30 - still a patient              Continued Care and Services - Admitted Since 5/9/2022    Coordination  has not been started for this encounter.          Demographic Summary     Row Name 05/09/22 2122       General Information    Admission Type inpatient    Arrived From home    Referral Source admission list;emergency department    Reason for Consult discharge planning    Preferred Language English               Functional Status     Row Name 05/09/22 2122       Functional Status, IADL    Medications independent    Meal Preparation independent    Housekeeping independent    Laundry independent    Shopping independent       Mental Status Summary    Recent Changes in Mental Status/Cognitive Functioning unable to assess       Employment/    Employment Status self-employed               Psychosocial    No documentation.                Abuse/Neglect    No documentation.                Legal    No documentation.                Substance Abuse    No documentation.                Patient Forms    No documentation.                   RENARD Mendoza

## 2022-05-10 NOTE — PROGRESS NOTES
INTENSIVIST   PROGRESS NOTE     Hospital:  LOS: 1 day      FUAD Rodrigez 36 y.o. female is followed for: Abdominal Pain       DKA    UTI (urinary tract infection)    Proteinuria    As an Intensivist, we provide an integrated approach to the ICU patient and family, medical management of comorbid conditions, including but not limited to electrolytes, glycemic control, organ dysfunction, lead interdisciplinary rounds and coordinate the care with all other services, including those from other specialists.     Interval History:  Better than last night.  She thinks she does not have any more blood after so many labs have been done.  Hungry.    The patient has started, but not completed, their COVID-19 vaccination series.     Temp  Min: 98.9 °F (37.2 °C)  Max: 98.9 °F (37.2 °C)       History     Last Reviewed by Roland Phillip MD on 5/10/2022 at  7:48 AM    Sections Reviewed    Medical, Family, Surgical, Tobacco, Alcohol, Drug Use, Sexual Activity,   Social Documentation      Problem list reviewed by Roland Phillip MD on 5/10/2022 at  7:48 AM  Medicines reviewed by Roland Phillip MD on 5/10/2022 at  7:48 AM  Allergies reviewed by Roland Phillip MD on 5/10/2022 at  7:48 AM       The patient's relevant past medical, surgical and social history were reviewed and updated in Epic as appropriate.        O     Vitals:  Temp: 98.9 °F (37.2 °C) (05/09/22 2030) Temp  Min: 98.9 °F (37.2 °C)  Max: 98.9 °F (37.2 °C)   Temp core:      BP: 120/79 (05/10/22 1100) BP  Min: 106/67  Max: 134/80   Pulse: 93 (05/10/22 1100) Pulse  Min: 82  Max: 110   Resp: 16 (05/10/22 0600) Resp  Min: 16  Max: 18   SpO2: 99 % (05/10/22 1100) SpO2  Min: 96 %  Max: 100 %   Device: room air (05/10/22 1100)    Flow Rate:   No data recorded     Intake/Ouptut 24 hrs (7:00AM - 6:59 AM)  Intake & Output (last 3 days)       05/07 0701  05/08 0700 05/08 0701 05/09 0700 05/09 0701  05/10 0700 05/10 0701  05/11 0700    P.O.   400     I.V. (mL/kg)   1205 (15.4) 732.7  (5.4)    IV Piggyback   1000     Total Intake(mL/kg)   2605 (33.4) 424.7 (5.4)    Urine (mL/kg/hr)   2050 725 (1.5)    Total Output   2050 725    Net   +555 -300.3                  Wt Readings from Last 3 Encounters:   05/09/22 78 kg (172 lb)   05/09/22 78.2 kg (172 lb 8 oz)   04/28/22 95.7 kg (211 lb)       Medications (drips):  dextrose 5 % and lactated Ringer's, Last Rate: 100 mL/hr (05/10/22 0837)  insulin, Last Rate: 2.8 Units/hr (05/10/22 1255)      Physical Examination  Telemetry:  Rhythm: normal sinus rhythm (05/10/22 1000)         Constitutional:  No acute distress.   Cardiovascular: RRR.   Normal heart sounds.  No murmurs, gallop or rub.   Respiratory: Normal breath sounds  No adventitious sounds.   Abdominal:  Soft with no tenderness.  No distension.   No HSM.   Extremities: Warm.  Dry.  No cyanosis.  No Edema   Neurological:   Alert, Oriented, Cooperative.  Best Eye Response: 4-->(E4) spontaneous (05/10/22 1000)  Best Motor Response: 6-->(M6) obeys commands (05/10/22 1000)  Best Verbal Response: 5-->(V5) oriented (05/10/22 1000)  [place] Coma Scale Score: 15 (05/10/22 1000)     Results Reviewed:  Laboratory  Microbiology  Radiology  Pathology    Hematology:  Results from last 7 days   Lab Units 05/10/22  0209 05/09/22  1835 05/09/22  1103   WBC 10*3/mm3 6.79 10.28 11.58*   HEMOGLOBIN g/dL 13.5 14.4 16.3*   MCV fL 85.8 87.0 85.2   PLATELETS 10*3/mm3 299 375 426   NEUTROS ABS 10*3/mm3  --  8.57* 9.89*   LYMPHS ABS 10*3/mm3  --  0.97 0.92   EOS ABS 10*3/mm3  --  0.03 0.02     Chemistry:  Estimated Creatinine Clearance: 153.5 mL/min (A) (by C-G formula based on SCr of 0.47 mg/dL (L)).    Results from last 7 days   Lab Units 05/10/22  0812 05/10/22  0402 05/10/22  0209 05/09/22  1835 05/09/22  1103   GLUCOSE mg/dL 161* 139* 217* 274* 379*   SODIUM mmol/L 135* 136 136 135* 129*   POTASSIUM mmol/L 3.6 3.0* 3.2* 3.7 3.7   CHLORIDE mmol/L 108* 108* 110* 107 96*   CO2 mmol/L 13.0* 13.0* 12.0* 8.0* 6.0*   BUN mg/dL  4* 4* 5* 7 13   CREATININE mg/dL 0.47* 0.52* 0.50* 0.65 0.89   ANION GAP mmol/L 14.0 15.0 14.0 20.0* 27.0*       Results from last 7 days   Lab Units 05/10/22  0812 05/10/22  0402 05/10/22  0209 05/09/22  1835 05/09/22  1103   MAGNESIUM mg/dL 1.7 1.6 1.8 1.9 2.0   PHOSPHORUS mg/dL 1.7* 2.0* 1.9* 1.2*  --    IONIZED CALCIUM mmol/L  --  1.30  --  1.33*  --        Results from last 7 days   Lab Units 05/09/22  1639   PH, ARTERIAL pH units 7.164*   PCO2, ARTERIAL mm Hg 21.3*   PO2 ART mm Hg 106.0   HCO3 ART mmol/L 7.6*   FIO2 % 21       Results from last 7 days   Lab Units 05/09/22  1835   OSMOLALITY mOsm/kg 296*       Results from last 7 days   Lab Units 05/09/22  1103   BETA-HYDROXYBUTYRATE QUANT mmol/L 6.715*       Results from last 7 days   Lab Units 05/09/22  1115   KETONES UA  >=160 mg/dL (4+)*       Cardiac Labs:  Results from last 7 days   Lab Units 05/09/22  1103   CK TOTAL U/L 34   PROBNP pg/mL 55.9   TROPONIN T ng/mL <0.010     Biomarkers:  Results from last 7 days   Lab Units 05/09/22  1835 05/09/22  1103   LACTATE mmol/L  --  1.4   PROCALCITONIN ng/mL 0.06  --      COVID-19  Lab Results   Component Value Date    COVID19 Not Detected 05/09/2022    COVID19 Not Detected 04/26/2022    COVID19 Not Detected 07/26/2021     Arterial Blood Gases:  Results from last 7 days   Lab Units 05/09/22  1639   PH, ARTERIAL pH units 7.164*   PCO2, ARTERIAL mm Hg 21.3*   PO2 ART mm Hg 106.0   FIO2 % 21       Images:  CT Abdomen Pelvis With Contrast    Result Date: 5/9/2022  No evidence of pulmonary embolism. No acute intrathoracic findings.  No acute abdominopelvic findings. Expected postoperative appearance of recent laparoscopic sleeve gastrectomy and cholecystectomy.  This report was finalized on 5/9/2022 3:25 PM by Cuauhtemoc Godoy MD.      XR Chest 1 View    Result Date: 5/9/2022   Negative chest x-ray. No evidence of acute cardiopulmonary disease.  This report was finalized on 5/9/2022 10:59 AM by Scotty Vo.      CT  Angiogram Chest    Result Date: 5/9/2022  No evidence of pulmonary embolism. No acute intrathoracic findings.  No acute abdominopelvic findings. Expected postoperative appearance of recent laparoscopic sleeve gastrectomy and cholecystectomy.  This report was finalized on 5/9/2022 3:25 PM by Cuauhtemoc Godoy MD.        Echo:  Results for orders placed during the hospital encounter of 09/21/21    Adult Transthoracic Echo Complete W/ Cont if Necessary Per Protocol    Interpretation Summary  · Normal left ventricular cavity size and wall thickness  · Left ventricular ejection fraction appears to be 61 - 65%.  · Left ventricular diastolic function was normal.  · No aortic valve regurgitation or stenosis is present.  · Trace to mild mitral valve regurgitation is present.  · Physiologic tricuspid valve regurgitation is present.    Recommendation.    Cleared to undergo surgery with usual precaution.      Results: Reviewed.  I reviewed the patient's new laboratory and imaging results.  I independently reviewed the patient's new images.    Medications: Reviewed.    Assessment     A / P     Rain is a 36 y.o. female admitted on 5/9/2022 with Diabetic ketoacidosis without coma associated with type 1 diabetes mellitus (HCC) [E10.10]:    1. DKA  2. Obesity Body mass index is 32.5 kg/m².  1. S/P Lap Sleeve gastrectomy 04/28/2022  3. Dyslipidemia     Results from last 7 days   Lab Units 05/10/22  1253 05/10/22  1152 05/10/22  1053 05/10/22  0940 05/10/22  0830 05/10/22  0715   GLUCOSE mg/dL 133* 127 140* 150* 165* 130     Lab Results   Lab Value Date/Time    HGBA1C 10.80 (H) 05/09/2022 1835    HGBA1C 11.60 (H) 04/26/2022 1258       Nutrition Support: Patient isn't on Tube Feeding   Modulars: Patient doesn't have any tube feeding modular orders   Diet: Diet Bariatric; Stage 3 Full Liquid Sugar Free + Some Soft; Consistent Carbohydrate   Advance Directives: Code Status and Medical Interventions:   Ordered at: 05/09/22 1744     Code  Status (Patient has no pulse and is not breathing):    CPR (Attempt to Resuscitate)     Medical Interventions (Patient has pulse or is breathing):    Full Support        Management Plan:    1. Insulin intravenous infusion until Anion Gap is resolved.  Vitamins/Minerals for Bariatric surgery. May need B1 (thiamine) Folate, as well as Vit B12, A, D, E, K, Iron, Calcium, Zn and Copper.  Disposition: Keep in ICU.    Plan of care and goals reviewed during interdisciplinary rounds.  I discussed the patient's findings and my recommendations with patient    Level of Risk is High due to:  illness with threat to life or bodily function.     Time: 25 minutes, in direct patient care, with the patient and/or on the arguello coordinating care with other health care providers.     I have spent > 50% percent of this time, counseling and discussing management.     [x]  Primary Attending  []  Consultant

## 2022-05-10 NOTE — PROGRESS NOTES
"Bariatric Surgery     LOS: 1 day   Patient Care Team:  Yolanda Tian APRN as PCP - General (Nurse Practitioner)    Chief Complaint:  POD #12, DKA    Subjective     Interval History:  Doing better today.  She had felt poorly and was seen in the office yesterday with severe epigastric pain, dizziness, n/v, and tachycardia.  She ahd been tolerating her diet well, but a few days prior had the onset of nausea, pain, and poor oral intake.  She was sent to the ER and found to be in diabetic keoacidosis.  CT A/P and chest were normal.    She was admitted to the ICU for insulin gtt and DKA protocol.  Anion gap is closing, but she is receiving potassium and phosphate replacement.    Tolerating stage 3 diet: had tuna today (this is actually stage 4) and tolerated.  Per nurse, she did not try any of her protein shakes today.    Has not ambulated today.  Does not have an IS, but at home was pulling volume of 2000 and has no pulmonary complaints.    Her nausea and abdominal pain are much improved today.       Objective     Vital Signs  Blood pressure 136/90, pulse 98, temperature 98.9 °F (37.2 °C), temperature source Oral, resp. rate 16, height 154.9 cm (61\"), weight 78 kg (172 lb), SpO2 99 %, not currently breastfeeding.    Physical Exam:  General: Alert, NAD  Abdomen: soft, appropriate, incisions okay, NT  Extremities: warm, (+) SCDs     Results Review:     I reviewed the patient's new clinical results.  I reviewed the patient's new imaging results and agree with the interpretation.    Labs:  Lab Results (last 24 hours)     Procedure Component Value Units Date/Time    Basic Metabolic Panel [[id number]]  (Abnormal) Collected: 05/10/22 1916    Specimen: Blood Updated: 05/10/22 1955     Glucose 150 mg/dL      BUN 3 mg/dL      Creatinine 0.54 mg/dL      Sodium 136 mmol/L      Potassium 3.2 mmol/L      Chloride 106 mmol/L      CO2 18.0 mmol/L      Calcium 9.5 mg/dL      BUN/Creatinine Ratio 5.6     Anion Gap 12.0 mmol/L      eGFR " 121.0 mL/min/1.73      Comment: National Kidney Foundation and American Society of Nephrology (ASN) Task Force recommended calculation based on the Chronic Kidney Disease Epidemiology Collaboration (CKD-EPI) equation refit without adjustment for race.       Narrative:      GFR Normal >60  Chronic Kidney Disease <60  Kidney Failure <15      POC Glucose Once [[id number]]  (Abnormal) Collected: 05/10/22 1937    Specimen: Blood Updated: 05/10/22 1938     Glucose 145 mg/dL      Comment: Meter: [id number] : [id number] Inés John       POC Glucose Once [[id number]]  (Abnormal) Collected: 05/10/22 1716    Specimen: Blood Updated: 05/10/22 1718     Glucose 132 mg/dL      Comment: Meter: [id number] : [id number] Valeria Brown       POC Glucose Once [[id number]]  (Normal) Collected: 05/10/22 1508    Specimen: Blood Updated: 05/10/22 1509     Glucose 123 mg/dL      Comment: Meter: [id number] : [id number] Esa HUERTA       Beta Hydroxybutyrate Quantitative [[id number]]  (Abnormal) Collected: 05/10/22 1236    Specimen: Blood Updated: 05/10/22 1342     Beta-Hydroxybutyrate Quant 2.077 mmol/L     Narrative:      In the assessment of possible diabetic ketoacidosis, the test should be interpreted along with other clinical and laboratory findings.  A level greater than 1 mmol/L should require further evaluation and levels of more than 3 mmol/L require immediate medical review.    Phosphorus [[id number]]  (Abnormal) Collected: 05/10/22 1236    Specimen: Blood Updated: 05/10/22 1341     Phosphorus 1.2 mg/dL     Basic Metabolic Panel [[id number]]  (Abnormal) Collected: 05/10/22 1236    Specimen: Blood Updated: 05/10/22 1340     Glucose 155 mg/dL      BUN 3 mg/dL      Creatinine 0.46 mg/dL      Sodium 135 mmol/L      Potassium 3.8 mmol/L      Chloride 107 mmol/L      CO2 15.0 mmol/L      Calcium 9.3 mg/dL      BUN/Creatinine Ratio 6.5     Anion Gap 13.0 mmol/L      eGFR 125.8 mL/min/1.73      Comment: National Kidney Foundation  and American Society of Nephrology (ASN) Task Force recommended calculation based on the Chronic Kidney Disease Epidemiology Collaboration (CKD-EPI) equation refit without adjustment for race.       Narrative:      GFR Normal >60  Chronic Kidney Disease <60  Kidney Failure <15      Magnesium [[id number]]  (Normal) Collected: 05/10/22 1236    Specimen: Blood Updated: 05/10/22 1340     Magnesium 1.6 mg/dL     Blood Culture - Blood, Arm, Left [[id number]]  (Normal) Collected: 05/09/22 1220    Specimen: Blood from Arm, Left Updated: 05/10/22 1304     Blood Culture No growth at 24 hours    Blood Culture - Blood, Arm, Left [[id number]]  (Normal) Collected: 05/09/22 1200    Specimen: Blood from Arm, Left Updated: 05/10/22 1304     Blood Culture No growth at 24 hours    POC Glucose Once [[id number]]  (Abnormal) Collected: 05/10/22 1253    Specimen: Blood Updated: 05/10/22 1254     Glucose 133 mg/dL      Comment: Meter: [id number] : [id number] Lisanby Debi A       POC Glucose Once [[id number]]  (Normal) Collected: 05/10/22 1152    Specimen: Blood Updated: 05/10/22 1154     Glucose 127 mg/dL      Comment: Meter: [id number] : [id number] Gramig Manasa S       POC Glucose Once [[id number]]  (Abnormal) Collected: 05/10/22 1053    Specimen: Blood Updated: 05/10/22 1054     Glucose 140 mg/dL      Comment: Meter: [id number] : [id number] Lisanby Debi A       POC Glucose Once [[id number]]  (Abnormal) Collected: 05/10/22 0940    Specimen: Blood Updated: 05/10/22 0942     Glucose 150 mg/dL      Comment: Meter: [id number] : [id number] Gramig Manasa S       Phosphorus [[id number]]  (Abnormal) Collected: 05/10/22 0812    Specimen: Blood Updated: 05/10/22 0907     Phosphorus 1.7 mg/dL     Basic Metabolic Panel [[id number]]  (Abnormal) Collected: 05/10/22 0812    Specimen: Blood Updated: 05/10/22 0904     Glucose 161 mg/dL      BUN 4 mg/dL      Creatinine 0.47 mg/dL      Sodium 135 mmol/L      Potassium 3.6 mmol/L      Chloride 108  mmol/L      CO2 13.0 mmol/L      Calcium 8.8 mg/dL      BUN/Creatinine Ratio 8.5     Anion Gap 14.0 mmol/L      eGFR 125.1 mL/min/1.73      Comment: National Kidney Foundation and American Society of Nephrology (ASN) Task Force recommended calculation based on the Chronic Kidney Disease Epidemiology Collaboration (CKD-EPI) equation refit without adjustment for race.       Narrative:      GFR Normal >60  Chronic Kidney Disease <60  Kidney Failure <15      Magnesium [[id number]]  (Normal) Collected: 05/10/22 0812    Specimen: Blood Updated: 05/10/22 0904     Magnesium 1.7 mg/dL     POC Glucose Once [[id number]]  (Abnormal) Collected: 05/10/22 0830    Specimen: Blood Updated: 05/10/22 0831     Glucose 165 mg/dL      Comment: Meter: [id number] : [id number] [employer]       POC Glucose Once [[id number]]  (Normal) Collected: 05/10/22 0715    Specimen: Blood Updated: 05/10/22 0717     Glucose 130 mg/dL      Comment: Meter: [id number] : [id number] Logglynna       POC Glucose Once [[id number]]  (Normal) Collected: 05/10/22 0657    Specimen: Blood Updated: 05/10/22 0658     Glucose 110 mg/dL      Comment: Meter: [id number] : [id number] [employer]       POC Glucose Once [[id number]]  (Normal) Collected: 05/10/22 0618    Specimen: Blood Updated: 05/10/22 0621     Glucose 92 mg/dL      Comment: Meter: [id number] : [id number] Conte Dora       POC Glucose Once [[id number]]  (Normal) Collected: 05/10/22 0559    Specimen: Blood Updated: 05/10/22 0600     Glucose 73 mg/dL      Comment: Meter: [id number] : [id number] Conte Dora       POC Glucose Once [[id number]]  (Normal) Collected: 05/10/22 0521    Specimen: Blood Updated: 05/10/22 0522     Glucose 100 mg/dL      Comment: Meter: [id number] : [id number] Conte Dora       Calcium, Ionized [[id number]]  (Normal) Collected: 05/10/22 0402    Specimen: Blood Updated: 05/10/22 0506     Ionized Calcium 1.30 mmol/L     Basic Metabolic Panel [[id number]]  (Abnormal)  Collected: 05/10/22 0402    Specimen: Blood Updated: 05/10/22 0458     Glucose 139 mg/dL      BUN 4 mg/dL      Creatinine 0.52 mg/dL      Sodium 136 mmol/L      Potassium 3.0 mmol/L      Chloride 108 mmol/L      CO2 13.0 mmol/L      Calcium 8.6 mg/dL      BUN/Creatinine Ratio 7.7     Anion Gap 15.0 mmol/L      eGFR 122.1 mL/min/1.73      Comment: National Kidney Foundation and American Society of Nephrology (ASN) Task Force recommended calculation based on the Chronic Kidney Disease Epidemiology Collaboration (CKD-EPI) equation refit without adjustment for race.       Narrative:      GFR Normal >60  Chronic Kidney Disease <60  Kidney Failure <15      Phosphorus [[id number]]  (Abnormal) Collected: 05/10/22 0402    Specimen: Blood Updated: 05/10/22 0458     Phosphorus 2.0 mg/dL     Magnesium [[id number]]  (Normal) Collected: 05/10/22 0402    Specimen: Blood Updated: 05/10/22 0458     Magnesium 1.6 mg/dL     POC Glucose Once [[id number]]  (Abnormal) Collected: 05/10/22 0401    Specimen: Blood Updated: 05/10/22 0402     Glucose 135 mg/dL      Comment: Meter: [id number] : [id number] Inés John       Phosphorus [[id number]]  (Abnormal) Collected: 05/10/22 0209    Specimen: Blood from Arm, Left Updated: 05/10/22 0326     Phosphorus 1.9 mg/dL     Basic Metabolic Panel [[id number]]  (Abnormal) Collected: 05/10/22 0209    Specimen: Blood from Arm, Left Updated: 05/10/22 0315     Glucose 217 mg/dL      BUN 5 mg/dL      Creatinine 0.50 mg/dL      Sodium 136 mmol/L      Potassium 3.2 mmol/L      Chloride 110 mmol/L      CO2 12.0 mmol/L      Calcium 8.4 mg/dL      BUN/Creatinine Ratio 10.0     Anion Gap 14.0 mmol/L      eGFR 123.3 mL/min/1.73      Comment: National Kidney Foundation and American Society of Nephrology (ASN) Task Force recommended calculation based on the Chronic Kidney Disease Epidemiology Collaboration (CKD-EPI) equation refit without adjustment for race.       Narrative:      GFR Normal >60  Chronic Kidney  Disease <60  Kidney Failure <15      Magnesium [[id number]]  (Normal) Collected: 05/10/22 0209    Specimen: Blood from Arm, Left Updated: 05/10/22 0315     Magnesium 1.8 mg/dL     POC Glucose Once [[id number]]  (Abnormal) Collected: 05/10/22 0259    Specimen: Blood Updated: 05/10/22 0306     Glucose 185 mg/dL      Comment: Meter: [id number] : [id number] Conte Dora       CBC (No Diff) [[id number]]  (Normal) Collected: 05/10/22 0209    Specimen: Blood from Arm, Left Updated: 05/10/22 0302     WBC 6.79 10*3/mm3      RBC 4.57 10*6/mm3      Hemoglobin 13.5 g/dL      Hematocrit 39.2 %      MCV 85.8 fL      MCH 29.5 pg      MCHC 34.4 g/dL      RDW 13.4 %      RDW-SD 40.9 fl      MPV 10.6 fL      Platelets 299 10*3/mm3     POC Glucose Once [[id number]]  (Abnormal) Collected: 05/10/22 0156    Specimen: Blood Updated: 05/10/22 0158     Glucose 202 mg/dL      Comment: Meter: [id number] : [id number] Conte Dora       POC Glucose Once [[id number]]  (Abnormal) Collected: 05/10/22 0055    Specimen: Blood Updated: 05/10/22 0056     Glucose 217 mg/dL      Comment: Meter: [id number] : [id number] Conte Dora       POC Glucose Once [[id number]]  (Abnormal) Collected: 05/09/22 2354    Specimen: Blood Updated: 05/09/22 2355     Glucose 240 mg/dL      Comment: Meter: [id number] : [id number] Conte Dora       POC Glucose Once [[id number]]  (Abnormal) Collected: 05/09/22 2245    Specimen: Blood Updated: 05/09/22 2246     Glucose 250 mg/dL      Comment: Meter: [id number] : [id number] Conte Dora       POC Glucose Once [[id number]]  (Abnormal) Collected: 05/09/22 2143    Specimen: Blood Updated: 05/09/22 2146     Glucose 246 mg/dL      Comment: Meter: [id number] : [id number] [facility]       POC Glucose Once [[id number]]  (Abnormal) Collected: 05/09/22 2047    Specimen: Blood Updated: 05/09/22 2048     Glucose 265 mg/dL      Comment: Meter: [id number] : [id number] Rubi Elizabeth             Imaging:  Imaging  Results (Last 24 Hours)     ** No results found for the last 24 hours. **            Assessment & Plan     POD # 12 s/p LSG/ella 4/28/22 by Dr. Finnegan.    Readmitted with DKA.  CT scan showed no evidence of leak, pleural effusion, pneumonia, or PE.  Anion gap is closing, still has electrolyte derangements.  Her nausea and abdominal pain are improving.  Appreciate ICU care.    Stage 2 diet ok.  Pt advised to priorize protein shakes.  She did receive tuna today (stage 3 diet was ordered) and she tolerated well but could not eat much.    Avoid NSAIDs/Steroids/ASA to prevent life-threatening delayed gastric leak.    Continue OOB/ PT/ DVT prophx.  IV PPI.  Pt encouraged to ambulate and perform IS hourly.    Oral bariatric diet regimen ordered.  Check prealbumin.          Keesha Adams MD  05/10/22  19:59 EDT

## 2022-05-11 LAB
ALBUMIN SERPL-MCNC: 3.6 G/DL (ref 3.5–5.2)
ALBUMIN/GLOB SERPL: 1.4 G/DL
ALP SERPL-CCNC: 132 U/L (ref 39–117)
ALT SERPL W P-5'-P-CCNC: 17 U/L (ref 1–33)
ANION GAP SERPL CALCULATED.3IONS-SCNC: 16 MMOL/L (ref 5–15)
AST SERPL-CCNC: 10 U/L (ref 1–32)
B-OH-BUTYR SERPL-SCNC: 2.98 MMOL/L (ref 0.02–0.27)
BASOPHILS # BLD AUTO: 0.02 10*3/MM3 (ref 0–0.2)
BASOPHILS NFR BLD AUTO: 0.3 % (ref 0–1.5)
BILIRUB SERPL-MCNC: 0.5 MG/DL (ref 0–1.2)
BUN SERPL-MCNC: 3 MG/DL (ref 6–20)
BUN/CREAT SERPL: 5.7 (ref 7–25)
CALCIUM SPEC-SCNC: 8.6 MG/DL (ref 8.6–10.5)
CHLORIDE SERPL-SCNC: 102 MMOL/L (ref 98–107)
CO2 SERPL-SCNC: 19 MMOL/L (ref 22–29)
CREAT SERPL-MCNC: 0.53 MG/DL (ref 0.57–1)
CRP SERPL-MCNC: 0.87 MG/DL (ref 0–0.5)
D-LACTATE SERPL-SCNC: 0.8 MMOL/L (ref 0.5–2)
DEPRECATED RDW RBC AUTO: 37.2 FL (ref 37–54)
EGFRCR SERPLBLD CKD-EPI 2021: 121.6 ML/MIN/1.73
EOSINOPHIL # BLD AUTO: 0.05 10*3/MM3 (ref 0–0.4)
EOSINOPHIL NFR BLD AUTO: 0.9 % (ref 0.3–6.2)
ERYTHROCYTE [DISTWIDTH] IN BLOOD BY AUTOMATED COUNT: 12.9 % (ref 12.3–15.4)
GLOBULIN UR ELPH-MCNC: 2.6 GM/DL
GLUCOSE BLDC GLUCOMTR-MCNC: 143 MG/DL (ref 70–130)
GLUCOSE BLDC GLUCOMTR-MCNC: 186 MG/DL (ref 70–130)
GLUCOSE BLDC GLUCOMTR-MCNC: 212 MG/DL (ref 70–130)
GLUCOSE BLDC GLUCOMTR-MCNC: 236 MG/DL (ref 70–130)
GLUCOSE BLDC GLUCOMTR-MCNC: 244 MG/DL (ref 70–130)
GLUCOSE BLDC GLUCOMTR-MCNC: 83 MG/DL (ref 70–130)
GLUCOSE SERPL-MCNC: 262 MG/DL (ref 65–99)
HCT VFR BLD AUTO: 34.9 % (ref 34–46.6)
HGB BLD-MCNC: 12.9 G/DL (ref 12–15.9)
IMM GRANULOCYTES # BLD AUTO: 0.02 10*3/MM3 (ref 0–0.05)
IMM GRANULOCYTES NFR BLD AUTO: 0.3 % (ref 0–0.5)
LYMPHOCYTES # BLD AUTO: 1.31 10*3/MM3 (ref 0.7–3.1)
LYMPHOCYTES NFR BLD AUTO: 22.7 % (ref 19.6–45.3)
MAGNESIUM SERPL-MCNC: 2.5 MG/DL (ref 1.6–2.6)
MCH RBC QN AUTO: 29.8 PG (ref 26.6–33)
MCHC RBC AUTO-ENTMCNC: 37 G/DL (ref 31.5–35.7)
MCV RBC AUTO: 80.6 FL (ref 79–97)
MONOCYTES # BLD AUTO: 0.37 10*3/MM3 (ref 0.1–0.9)
MONOCYTES NFR BLD AUTO: 6.4 % (ref 5–12)
NEUTROPHILS NFR BLD AUTO: 3.99 10*3/MM3 (ref 1.7–7)
NEUTROPHILS NFR BLD AUTO: 69.4 % (ref 42.7–76)
NRBC BLD AUTO-RTO: 0 /100 WBC (ref 0–0.2)
PHOSPHATE SERPL-MCNC: 3.1 MG/DL (ref 2.5–4.5)
PLATELET # BLD AUTO: 283 10*3/MM3 (ref 140–450)
PMV BLD AUTO: 10.1 FL (ref 6–12)
POTASSIUM SERPL-SCNC: 3.7 MMOL/L (ref 3.5–5.2)
PREALB SERPL-MCNC: 9.2 MG/DL (ref 20–40)
PROT SERPL-MCNC: 6.2 G/DL (ref 6–8.5)
RBC # BLD AUTO: 4.33 10*6/MM3 (ref 3.77–5.28)
SODIUM SERPL-SCNC: 137 MMOL/L (ref 136–145)
WBC NRBC COR # BLD: 5.76 10*3/MM3 (ref 3.4–10.8)

## 2022-05-11 PROCEDURE — 63710000001 INSULIN LISPRO (HUMAN) PER 5 UNITS: Performed by: INTERNAL MEDICINE

## 2022-05-11 PROCEDURE — 83605 ASSAY OF LACTIC ACID: CPT | Performed by: INTERNAL MEDICINE

## 2022-05-11 PROCEDURE — 63710000001 INSULIN DETEMIR PER 5 UNITS: Performed by: INTERNAL MEDICINE

## 2022-05-11 PROCEDURE — 85025 COMPLETE CBC W/AUTO DIFF WBC: CPT | Performed by: INTERNAL MEDICINE

## 2022-05-11 PROCEDURE — 86140 C-REACTIVE PROTEIN: CPT | Performed by: INTERNAL MEDICINE

## 2022-05-11 PROCEDURE — 82962 GLUCOSE BLOOD TEST: CPT

## 2022-05-11 PROCEDURE — 80053 COMPREHEN METABOLIC PANEL: CPT | Performed by: INTERNAL MEDICINE

## 2022-05-11 PROCEDURE — 25010000002 THIAMINE PER 100 MG: Performed by: INTERNAL MEDICINE

## 2022-05-11 PROCEDURE — 83735 ASSAY OF MAGNESIUM: CPT | Performed by: INTERNAL MEDICINE

## 2022-05-11 PROCEDURE — 99232 SBSQ HOSP IP/OBS MODERATE 35: CPT | Performed by: INTERNAL MEDICINE

## 2022-05-11 PROCEDURE — 99024 POSTOP FOLLOW-UP VISIT: CPT | Performed by: SURGERY

## 2022-05-11 PROCEDURE — 25010000002 ENOXAPARIN PER 10 MG: Performed by: INTERNAL MEDICINE

## 2022-05-11 PROCEDURE — 84100 ASSAY OF PHOSPHORUS: CPT | Performed by: INTERNAL MEDICINE

## 2022-05-11 PROCEDURE — 82010 KETONE BODYS QUAN: CPT | Performed by: INTERNAL MEDICINE

## 2022-05-11 PROCEDURE — 84134 ASSAY OF PREALBUMIN: CPT | Performed by: INTERNAL MEDICINE

## 2022-05-11 RX ORDER — FOLIC ACID 1 MG/1
1 TABLET ORAL DAILY
Status: DISCONTINUED | OUTPATIENT
Start: 2022-05-12 | End: 2022-05-12 | Stop reason: HOSPADM

## 2022-05-11 RX ORDER — LACTULOSE 10 G/15ML
30 SOLUTION ORAL 3 TIMES DAILY PRN
Status: DISCONTINUED | OUTPATIENT
Start: 2022-05-11 | End: 2022-05-12 | Stop reason: HOSPADM

## 2022-05-11 RX ADMIN — THIAMINE HYDROCHLORIDE 200 MG: 100 INJECTION, SOLUTION INTRAMUSCULAR; INTRAVENOUS at 10:13

## 2022-05-11 RX ADMIN — INSULIN LISPRO 3 UNITS: 100 INJECTION, SOLUTION INTRAVENOUS; SUBCUTANEOUS at 18:10

## 2022-05-11 RX ADMIN — Medication 10 ML: at 09:22

## 2022-05-11 RX ADMIN — INSULIN LISPRO 8 UNITS: 100 INJECTION, SOLUTION INTRAVENOUS; SUBCUTANEOUS at 12:03

## 2022-05-11 RX ADMIN — CYANOCOBALAMIN TAB 1000 MCG 1000 MCG: 1000 TAB at 09:16

## 2022-05-11 RX ADMIN — ENOXAPARIN SODIUM 40 MG: 40 INJECTION SUBCUTANEOUS at 09:16

## 2022-05-11 RX ADMIN — INSULIN DETEMIR 12 UNITS: 100 INJECTION, SOLUTION SUBCUTANEOUS at 10:18

## 2022-05-11 RX ADMIN — INSULIN DETEMIR 12 UNITS: 100 INJECTION, SOLUTION SUBCUTANEOUS at 20:53

## 2022-05-11 RX ADMIN — INSULIN LISPRO 8 UNITS: 100 INJECTION, SOLUTION INTRAVENOUS; SUBCUTANEOUS at 18:10

## 2022-05-11 RX ADMIN — THIAMINE HCL TAB 100 MG 100 MG: 100 TAB at 09:16

## 2022-05-11 RX ADMIN — PANTOPRAZOLE SODIUM 40 MG: 40 INJECTION, POWDER, LYOPHILIZED, FOR SOLUTION INTRAVENOUS at 05:29

## 2022-05-11 RX ADMIN — Medication 5000 UNITS: at 09:15

## 2022-05-11 RX ADMIN — LACTULOSE 30 G: 20 SOLUTION ORAL at 10:05

## 2022-05-11 RX ADMIN — OXYCODONE HYDROCHLORIDE AND ACETAMINOPHEN 1000 MG: 500 TABLET ORAL at 05:30

## 2022-05-11 RX ADMIN — INSULIN LISPRO 3 UNITS: 100 INJECTION, SOLUTION INTRAVENOUS; SUBCUTANEOUS at 09:15

## 2022-05-11 RX ADMIN — FOLIC ACID 1 MG: 5 INJECTION, SOLUTION INTRAMUSCULAR; INTRAVENOUS; SUBCUTANEOUS at 10:13

## 2022-05-11 RX ADMIN — INSULIN LISPRO 8 UNITS: 100 INJECTION, SOLUTION INTRAVENOUS; SUBCUTANEOUS at 09:16

## 2022-05-11 RX ADMIN — INSULIN LISPRO 2 UNITS: 100 INJECTION, SOLUTION INTRAVENOUS; SUBCUTANEOUS at 12:03

## 2022-05-11 RX ADMIN — FERROUS SULFATE TAB 325 MG (65 MG ELEMENTAL FE) 325 MG: 325 (65 FE) TAB at 09:15

## 2022-05-11 RX ADMIN — MULTIVITAMIN TABLET 1 TABLET: TABLET at 09:16

## 2022-05-11 NOTE — PLAN OF CARE
Goal Outcome Evaluation:      Patient VSS. Afebrile. Ambulated in hallway per GI surgery. IS at bedside, patient educated on use. Verbalized understanding. No c/o pain. Patient transitioned from insulin gtt, with Levemir and sliding scale. Continue to monitor.

## 2022-05-11 NOTE — CASE MANAGEMENT/SOCIAL WORK
Continued Stay Note  [facility]     Patient Name: Rain Barreto  MRN: [id number]  Today's Date: 5/11/2022    Admit Date: 5/9/2022     Discharge Plan     Row Name 05/11/22 1454       Plan    Plan Home    Plan Comments Patient sleeping at time of visit.  Plan is home upon discharge.  Patient independent prior to admission.  Pending patient progress discharge plan may change.  CM will continue to follow.               Discharge Codes    No documentation.               Expected Discharge Date and Time     Expected Discharge Date Expected Discharge Time    May 15, 2022             Beata Madrigal RN

## 2022-05-11 NOTE — PROGRESS NOTES
INTENSIVIST   PROGRESS NOTE     Hospital:  LOS: 2 days      FUAD Rodrigez 36 y.o. female is followed for: Abdominal Pain       DKA    UTI (urinary tract infection)    Proteinuria    As an Intensivist, we provide an integrated approach to the ICU patient and family, medical management of comorbid conditions, including but not limited to electrolytes, glycemic control, organ dysfunction, lead interdisciplinary rounds and coordinate the care with all other services, including those from other specialists.     Interval History:  Improved.  Constipated.  Some nausea but no vomiting.    The patient has started, but not completed, their COVID-19 vaccination series.     Temp  Min: 98.2 °F (36.8 °C)  Max: 99 °F (37.2 °C)       History     Last Reviewed by Roland Phlilip MD on 5/10/2022 at  7:48 AM    Sections Reviewed    Medical, Family, Surgical, Tobacco, Alcohol, Drug Use, Sexual Activity,   Social Documentation      Problem list reviewed by Roland Phillip MD on 5/10/2022 at  7:48 AM  Medicines reviewed by Roland Phillip MD on 5/10/2022 at  7:48 AM  Allergies reviewed by Roland Phillip MD on 5/10/2022 at  7:48 AM       The patient's relevant past medical, surgical and social history were reviewed and updated in Epic as appropriate.        O     Vitals:  Temp: 99 °F (37.2 °C) (05/11/22 0800) Temp  Min: 98.2 °F (36.8 °C)  Max: 99 °F (37.2 °C)   Temp core:      BP: 129/81 (05/11/22 1100) BP  Min: 111/73  Max: 137/91   Pulse: 102 (05/11/22 1230) Pulse  Min: 85  Max: 110   Resp:  (16) (05/11/22 0800) Resp  Min: 14  Max: 18   SpO2: 98 % (05/11/22 1130) SpO2  Min: 93 %  Max: 100 %   Device: room air (05/11/22 1200)    Flow Rate:   No data recorded     Intake/Ouptut 24 hrs (7:00AM - 6:59 AM)  Intake & Output (last 3 days)       05/08 0701 05/09 0700 05/09 0701  05/10 0700 05/10 0701 05/11 0700 05/11 0701  05/12 0700    P.O.  400      I.V. (mL/kg)  1205 (15.4) 1554.4 (19.9)     IV Piggyback  1000 700     Total Intake(mL/kg)   2605 (33.4) 2254.4 (28.9)     Urine (mL/kg/hr)  2050 2875 (1.5)     Total Output  2050 2875     Net  +555 -620.6             Stool Unmeasured Occurrence    1 x          Wt Readings from Last 3 Encounters:   05/09/22 78 kg (172 lb)   05/09/22 78.2 kg (172 lb 8 oz)   04/28/22 95.7 kg (211 lb)       Medications (drips):  dextrose 5 % and lactated Ringer's, Last Rate: 100 mL/hr (05/10/22 0837)      Physical Examination  Telemetry:  Rhythm: normal sinus rhythm (05/11/22 1200)         Constitutional:  No acute distress.   Cardiovascular: RRR.   Normal heart sounds.  No murmurs, gallop or rub.   Respiratory: Normal breath sounds  No adventitious sounds.   Abdominal:  Soft with no tenderness.  No distension.   No HSM.   Extremities: Warm.  Dry.  No cyanosis.  No Edema   Neurological:   Alert, Oriented, Cooperative.  Best Eye Response: 4-->(E4) spontaneous (05/11/22 1200)  Best Motor Response: 6-->(M6) obeys commands (05/11/22 1200)  Best Verbal Response: 5-->(V5) oriented (05/11/22 1200)  Stephanie Coma Scale Score: 15 (05/11/22 1200)     Results Reviewed:  Laboratory  Microbiology  Radiology  Pathology    Hematology:  Results from last 7 days   Lab Units 05/11/22  0303 05/10/22  0209 05/09/22  1835   WBC 10*3/mm3 5.76 6.79 10.28   HEMOGLOBIN g/dL 12.9 13.5 14.4   MCV fL 80.6 85.8 87.0   PLATELETS 10*3/mm3 283 299 375   NEUTROS ABS 10*3/mm3 3.99  --  8.57*   LYMPHS ABS 10*3/mm3 1.31  --  0.97   EOS ABS 10*3/mm3 0.05  --  0.03     Chemistry:  Estimated Creatinine Clearance: 136.1 mL/min (A) (by C-G formula based on SCr of 0.53 mg/dL (L)).    Results from last 7 days   Lab Units 05/11/22  0303 05/10/22  1916 05/10/22  1236 05/10/22  0812 05/10/22  0402 05/10/22  0209   GLUCOSE mg/dL 262* 150* 155* 161* 139* 217*   SODIUM mmol/L 137 136 135* 135* 136 136   POTASSIUM mmol/L 3.7 3.2* 3.8 3.6 3.0* 3.2*   CHLORIDE mmol/L 102 106 107 108* 108* 110*   CO2 mmol/L 19.0* 18.0* 15.0* 13.0* 13.0* 12.0*   BUN mg/dL 3* 3* 3* 4* 4* 5*    CREATININE mg/dL 0.53* 0.54* 0.46* 0.47* 0.52* 0.50*   ANION GAP mmol/L 16.0* 12.0 13.0 14.0 15.0 14.0       Results from last 7 days   Lab Units 05/11/22  0303 05/10/22  1236 05/10/22  0812 05/10/22  0402 05/10/22  0209 05/09/22  1835   MAGNESIUM mg/dL 2.5 1.6 1.7 1.6 1.8 1.9   PHOSPHORUS mg/dL 3.1 1.2* 1.7* 2.0* 1.9* 1.2*   IONIZED CALCIUM mmol/L  --   --   --  1.30  --  1.33*       Results from last 7 days   Lab Units 05/11/22  0303 05/10/22  1236 05/09/22  1103   BETA-HYDROXYBUTYRATE QUANT mmol/L 2.983* 2.077* 6.715*       Cardiac Labs:  Results from last 7 days   Lab Units 05/09/22  1103   CK TOTAL U/L 34   PROBNP pg/mL 55.9   TROPONIN T ng/mL <0.010     Biomarkers:  Results from last 7 days   Lab Units 05/11/22  0303 05/09/22  1835 05/09/22  1103   CRP mg/dL 0.87*  --   --    LACTATE mmol/L 0.8  --  1.4   PROCALCITONIN ng/mL  --  0.06  --      COVID-19  Lab Results   Component Value Date    COVID19 Not Detected 05/09/2022    COVID19 Not Detected 04/26/2022    COVID19 Not Detected 07/26/2021       Images:  CT Abdomen Pelvis With Contrast    Result Date: 5/9/2022  No evidence of pulmonary embolism. No acute intrathoracic findings.  No acute abdominopelvic findings. Expected postoperative appearance of recent laparoscopic sleeve gastrectomy and cholecystectomy.  This report was finalized on 5/9/2022 3:25 PM by Cuauhtemoc Godoy MD.      CT Angiogram Chest    Result Date: 5/9/2022  No evidence of pulmonary embolism. No acute intrathoracic findings.  No acute abdominopelvic findings. Expected postoperative appearance of recent laparoscopic sleeve gastrectomy and cholecystectomy.  This report was finalized on 5/9/2022 3:25 PM by Cuauhtemoc Godoy MD.        Echo:  Results for orders placed during the hospital encounter of 09/21/21    Adult Transthoracic Echo Complete W/ Cont if Necessary Per Protocol    Interpretation Summary  · Normal left ventricular cavity size and wall thickness  · Left ventricular ejection  fraction appears to be 61 - 65%.  · Left ventricular diastolic function was normal.  · No aortic valve regurgitation or stenosis is present.  · Trace to mild mitral valve regurgitation is present.  · Physiologic tricuspid valve regurgitation is present.    Recommendation.    Cleared to undergo surgery with usual precaution.      Results: Reviewed.  I reviewed the patient's new laboratory and imaging results.  I independently reviewed the patient's new images.    Medications: Reviewed.    Assessment     A / P     Rain is a 36 y.o. female admitted on 5/9/2022 with Diabetic ketoacidosis without coma associated with type 1 diabetes mellitus (HCC) [E10.10]:    1. DKA  2. Obesity Body mass index is 32.5 kg/m².  1. S/P Lap Sleeve gastrectomy 04/28/2022  3. Dyslipidemia     Results from last 7 days   Lab Units 05/11/22  1135 05/11/22  0703 05/11/22  0145 05/10/22  2308 05/10/22  2218 05/10/22  2159   GLUCOSE mg/dL 186* 244* 236* 135* 102 98     Lab Results   Lab Value Date/Time    HGBA1C 10.80 (H) 05/09/2022 1835    HGBA1C 11.60 (H) 04/26/2022 1258       Nutrition Support: Patient isn't on Tube Feeding   Modulars: Patient doesn't have any tube feeding modular orders   Diet: Diet Bariatric; Stage 2 Full Liquid Sugar Free (No Carbonation, No Straw)   Advance Directives: Code Status and Medical Interventions:   Ordered at: 05/09/22 1743     Code Status (Patient has no pulse and is not breathing):    CPR (Attempt to Resuscitate)     Medical Interventions (Patient has pulse or is breathing):    Full Support        Management Plan:    1. Insulin was transitioned to SQ last night.  2. Disposition: Transfer to Telemetry Unit } When OK with bariatric surgery.  1. Labs in AM      Plan of care and goals reviewed during interdisciplinary rounds.  I discussed the patient's findings and my recommendations with patient    Level of Risk is High due to:  illness with threat to life or bodily function.     Time: 25 minutes, in direct  patient care, with the patient and/or on the arguello coordinating care with other health care providers.     I have spent > 50% percent of this time, counseling and discussing management.     [x]  Primary Attending  []  Consultant

## 2022-05-11 NOTE — PROGRESS NOTES
"Bariatric Surgery     LOS: 2 days   Patient Care Team:  Yolanda Tian APRN as PCP - General (Nurse Practitioner)    Chief Complaint:  POD #12, DKA    Subjective     Interval History:    Continues to feel better in terms of nausea/abd pain.  Tolerating diet.  Had protein shake at breakfast, but not lunch b/c she fell asleep.     Objective     Vital Signs  Blood pressure 112/78, pulse 99, temperature 99 °F (37.2 °C), temperature source Axillary, resp. rate 18, height 154.9 cm (61\"), weight 78 kg (172 lb), SpO2 98 %, not currently breastfeeding.    Physical Exam:  General: Alert, NAD  Abdomen: soft, appropriate, incisions okay, NT  Extremities: warm, (+) SCDs     Results Review:     I reviewed the patient's new clinical results.  I reviewed the patient's new imaging results and agree with the interpretation.    Labs:  Lab Results (last 24 hours)     Procedure Component Value Units Date/Time    Blood Culture - Blood, Arm, Left [[id number]]  (Normal) Collected: 05/09/22 1220    Specimen: Blood from Arm, Left Updated: 05/11/22 1301     Blood Culture No growth at 2 days    Blood Culture - Blood, Arm, Left [[id number]]  (Normal) Collected: 05/09/22 1200    Specimen: Blood from Arm, Left Updated: 05/11/22 1301     Blood Culture No growth at 2 days    POC Glucose Once [[id number]]  (Abnormal) Collected: 05/11/22 1135    Specimen: Blood Updated: 05/11/22 1138     Glucose 186 mg/dL      Comment: Meter: [id number] : [id number] [place] Kevin       Prealbumin [[id number]]  (Abnormal) Collected: 05/11/22 0303    Specimen: Blood Updated: 05/11/22 0959     Prealbumin 9.2 mg/dL     POC Glucose Once [[id number]]  (Abnormal) Collected: 05/11/22 0703    Specimen: Blood Updated: 05/11/22 0705     Glucose 244 mg/dL      Comment: Meter: [id number] : [id number] Valeria Brown       CBC & Differential [[id number]]  (Abnormal) Collected: 05/11/22 0303    Specimen: Blood Updated: 05/11/22 0350    Narrative:      The following orders were created " for panel order CBC & Differential.  Procedure                               Abnormality         Status                     ---------                               -----------         ------                     CBC Auto Differential[[id number]]        Abnormal            Final result                 Please view results for these tests on the individual orders.    CBC Auto Differential [[id number]]  (Abnormal) Collected: 05/11/22 0303    Specimen: Blood Updated: 05/11/22 0350     WBC 5.76 10*3/mm3      RBC 4.33 10*6/mm3      Hemoglobin 12.9 g/dL      Hematocrit 34.9 %      MCV 80.6 fL      MCH 29.8 pg      MCHC 37.0 g/dL      RDW 12.9 %      RDW-SD 37.2 fl      MPV 10.1 fL      Platelets 283 10*3/mm3      Neutrophil % 69.4 %      Lymphocyte % 22.7 %      Monocyte % 6.4 %      Eosinophil % 0.9 %      Basophil % 0.3 %      Immature Grans % 0.3 %      Neutrophils, Absolute 3.99 10*3/mm3      Lymphocytes, Absolute 1.31 10*3/mm3      Monocytes, Absolute 0.37 10*3/mm3      Eosinophils, Absolute 0.05 10*3/mm3      Basophils, Absolute 0.02 10*3/mm3      Immature Grans, Absolute 0.02 10*3/mm3      nRBC 0.0 /100 WBC     Beta Hydroxybutyrate Quantitative [[id number]]  (Abnormal) Collected: 05/11/22 0303    Specimen: Blood Updated: 05/11/22 0346     Beta-Hydroxybutyrate Quant 2.983 mmol/L     Narrative:      In the assessment of possible diabetic ketoacidosis, the test should be interpreted along with other clinical and laboratory findings.  A level greater than 1 mmol/L should require further evaluation and levels of more than 3 mmol/L require immediate medical review.    Magnesium [[id number]]  (Normal) Collected: 05/11/22 0303    Specimen: Blood Updated: 05/11/22 0346     Magnesium 2.5 mg/dL     Phosphorus [[id number]]  (Normal) Collected: 05/11/22 0303    Specimen: Blood Updated: 05/11/22 0346     Phosphorus 3.1 mg/dL     Comprehensive Metabolic Panel [[id number]]  (Abnormal) Collected: 05/11/22 0303    Specimen: Blood Updated:  05/11/22 0340     Glucose 262 mg/dL      BUN 3 mg/dL      Creatinine 0.53 mg/dL      Sodium 137 mmol/L      Potassium 3.7 mmol/L      Chloride 102 mmol/L      CO2 19.0 mmol/L      Calcium 8.6 mg/dL      Total Protein 6.2 g/dL      Albumin 3.60 g/dL      ALT (SGPT) 17 U/L      AST (SGOT) 10 U/L      Alkaline Phosphatase 132 U/L      Total Bilirubin 0.5 mg/dL      Globulin 2.6 gm/dL      Comment: Calculated Result        A/G Ratio 1.4 g/dL      BUN/Creatinine Ratio 5.7     Anion Gap 16.0 mmol/L      eGFR 121.6 mL/min/1.73      Comment: National Kidney Foundation and American Society of Nephrology (ASN) Task Force recommended calculation based on the Chronic Kidney Disease Epidemiology Collaboration (CKD-EPI) equation refit without adjustment for race.       Narrative:      GFR Normal >60  Chronic Kidney Disease <60  Kidney Failure <15      C-reactive Protein [[id number]]  (Abnormal) Collected: 05/11/22 0303    Specimen: Blood Updated: 05/11/22 0340     C-Reactive Protein 0.87 mg/dL     Lactic Acid, Plasma [[id number]]  (Normal) Collected: 05/11/22 0303    Specimen: Blood Updated: 05/11/22 0334     Lactate 0.8 mmol/L      Comment: Falsely depressed results may occur on samples drawn from patients receiving N-Acetylcysteine (NAC) or Metamizole.       POC Glucose Once [[id number]]  (Abnormal) Collected: 05/11/22 0145    Specimen: Blood Updated: 05/11/22 0147     Glucose 236 mg/dL      Comment: Meter: [id number] : [id number] [employer]       POC Glucose Once [[id number]]  (Abnormal) Collected: 05/10/22 2308    Specimen: Blood Updated: 05/10/22 2310     Glucose 135 mg/dL      Comment: Meter: [id number] : [id number] [employer]       POC Glucose Once [[id number]]  (Normal) Collected: 05/10/22 2218    Specimen: Blood Updated: 05/10/22 2219     Glucose 102 mg/dL      Comment: Meter: [id number] : [id number] [employer]       POC Glucose Once [[id number]]  (Normal) Collected: 05/10/22 2159    Specimen: Blood Updated:  05/10/22 2201     Glucose 98 mg/dL      Comment: Meter: [id number] : [id number] Inés Jonesnna       Basic Metabolic Panel [[id number]]  (Abnormal) Collected: 05/10/22 1916    Specimen: Blood Updated: 05/10/22 1955     Glucose 150 mg/dL      BUN 3 mg/dL      Creatinine 0.54 mg/dL      Sodium 136 mmol/L      Potassium 3.2 mmol/L      Chloride 106 mmol/L      CO2 18.0 mmol/L      Calcium 9.5 mg/dL      BUN/Creatinine Ratio 5.6     Anion Gap 12.0 mmol/L      eGFR 121.0 mL/min/1.73      Comment: National Kidney Foundation and American Society of Nephrology (ASN) Task Force recommended calculation based on the Chronic Kidney Disease Epidemiology Collaboration (CKD-EPI) equation refit without adjustment for race.       Narrative:      GFR Normal >60  Chronic Kidney Disease <60  Kidney Failure <15      POC Glucose Once [[id number]]  (Abnormal) Collected: 05/10/22 1937    Specimen: Blood Updated: 05/10/22 1938     Glucose 145 mg/dL      Comment: Meter: [id number] : [id number] Inés Jonesnna       POC Glucose Once [[id number]]  (Abnormal) Collected: 05/10/22 1716    Specimen: Blood Updated: 05/10/22 1718     Glucose 132 mg/dL      Comment: Meter: [id number] : [id number] Valeria Brown             Imaging:  Imaging Results (Last 24 Hours)     ** No results found for the last 24 hours. **            Assessment & Plan     POD # 13 s/p LSG/ella 4/28/22 by Dr. Finnegan.    Readmitted with DKA.  CT scan showed no evidence of leak, pleural effusion, pneumonia, or PE.    Anion gap is closing, still has electrolyte derangements.  Her nausea and abdominal pain are improving.  She is off insulin gtt.    Appreciate ICU care.  Ok for transfer to [facility] per Bariatric service.    Stage 2 diet  Pt advised to priorize protein shakes.  Prealbumin = 9.2.  May need to discuss TPN or [place] feeds if she cannot improve nutrition.    Avoid NSAIDs/Steroids/ASA to prevent life-threatening delayed gastric leak.    Continue OOB/ PT/ DVT prophx.  IV PPI.  Pt  encouraged to ambulate and perform IS hourly.             Keesha Adams MD  05/11/22  15:44 EDT

## 2022-05-12 VITALS
RESPIRATION RATE: 14 BRPM | WEIGHT: 172 LBS | DIASTOLIC BLOOD PRESSURE: 98 MMHG | BODY MASS INDEX: 32.47 KG/M2 | SYSTOLIC BLOOD PRESSURE: 122 MMHG | HEIGHT: 61 IN | OXYGEN SATURATION: 97 % | HEART RATE: 98 BPM | TEMPERATURE: 98 F

## 2022-05-12 LAB
ANION GAP SERPL CALCULATED.3IONS-SCNC: 13 MMOL/L (ref 5–15)
B-OH-BUTYR SERPL-SCNC: 2.24 MMOL/L (ref 0.02–0.27)
B-OH-BUTYR SERPL-SCNC: 2.54 MMOL/L (ref 0.02–0.27)
BASOPHILS # BLD AUTO: 0.04 10*3/MM3 (ref 0–0.2)
BASOPHILS NFR BLD AUTO: 0.8 % (ref 0–1.5)
BUN SERPL-MCNC: 4 MG/DL (ref 6–20)
BUN/CREAT SERPL: 8.5 (ref 7–25)
CALCIUM SPEC-SCNC: 9.3 MG/DL (ref 8.6–10.5)
CHLORIDE SERPL-SCNC: 103 MMOL/L (ref 98–107)
CO2 SERPL-SCNC: 25 MMOL/L (ref 22–29)
CREAT SERPL-MCNC: 0.47 MG/DL (ref 0.57–1)
DEPRECATED RDW RBC AUTO: 37.4 FL (ref 37–54)
EGFRCR SERPLBLD CKD-EPI 2021: 125.1 ML/MIN/1.73
EOSINOPHIL # BLD AUTO: 0.09 10*3/MM3 (ref 0–0.4)
EOSINOPHIL NFR BLD AUTO: 1.7 % (ref 0.3–6.2)
ERYTHROCYTE [DISTWIDTH] IN BLOOD BY AUTOMATED COUNT: 12.6 % (ref 12.3–15.4)
GLUCOSE BLDC GLUCOMTR-MCNC: 106 MG/DL (ref 70–130)
GLUCOSE BLDC GLUCOMTR-MCNC: 210 MG/DL (ref 70–130)
GLUCOSE BLDC GLUCOMTR-MCNC: 212 MG/DL (ref 70–130)
GLUCOSE SERPL-MCNC: 109 MG/DL (ref 65–99)
HCT VFR BLD AUTO: 34.2 % (ref 34–46.6)
HGB BLD-MCNC: 12.3 G/DL (ref 12–15.9)
IMM GRANULOCYTES # BLD AUTO: 0.02 10*3/MM3 (ref 0–0.05)
IMM GRANULOCYTES NFR BLD AUTO: 0.4 % (ref 0–0.5)
LYMPHOCYTES # BLD AUTO: 1.7 10*3/MM3 (ref 0.7–3.1)
LYMPHOCYTES NFR BLD AUTO: 33 % (ref 19.6–45.3)
MAGNESIUM SERPL-MCNC: 2.1 MG/DL (ref 1.6–2.6)
MCH RBC QN AUTO: 29.5 PG (ref 26.6–33)
MCHC RBC AUTO-ENTMCNC: 36 G/DL (ref 31.5–35.7)
MCV RBC AUTO: 82 FL (ref 79–97)
MONOCYTES # BLD AUTO: 0.44 10*3/MM3 (ref 0.1–0.9)
MONOCYTES NFR BLD AUTO: 8.5 % (ref 5–12)
NEUTROPHILS NFR BLD AUTO: 2.86 10*3/MM3 (ref 1.7–7)
NEUTROPHILS NFR BLD AUTO: 55.6 % (ref 42.7–76)
NRBC BLD AUTO-RTO: 0 /100 WBC (ref 0–0.2)
PLATELET # BLD AUTO: 287 10*3/MM3 (ref 140–450)
PMV BLD AUTO: 10.2 FL (ref 6–12)
POTASSIUM SERPL-SCNC: 2.8 MMOL/L (ref 3.5–5.2)
POTASSIUM SERPL-SCNC: 3.8 MMOL/L (ref 3.5–5.2)
RBC # BLD AUTO: 4.17 10*6/MM3 (ref 3.77–5.28)
SODIUM SERPL-SCNC: 141 MMOL/L (ref 136–145)
WBC NRBC COR # BLD: 5.15 10*3/MM3 (ref 3.4–10.8)

## 2022-05-12 PROCEDURE — 83735 ASSAY OF MAGNESIUM: CPT | Performed by: INTERNAL MEDICINE

## 2022-05-12 PROCEDURE — 82962 GLUCOSE BLOOD TEST: CPT

## 2022-05-12 PROCEDURE — 99238 HOSP IP/OBS DSCHRG MGMT 30/<: CPT | Performed by: INTERNAL MEDICINE

## 2022-05-12 PROCEDURE — 25010000002 ENOXAPARIN PER 10 MG: Performed by: INTERNAL MEDICINE

## 2022-05-12 PROCEDURE — 63710000001 INSULIN LISPRO (HUMAN) PER 5 UNITS: Performed by: INTERNAL MEDICINE

## 2022-05-12 PROCEDURE — 82010 KETONE BODYS QUAN: CPT | Performed by: INTERNAL MEDICINE

## 2022-05-12 PROCEDURE — G0108 DIAB MANAGE TRN  PER INDIV: HCPCS

## 2022-05-12 PROCEDURE — 63710000001 INSULIN DETEMIR PER 5 UNITS: Performed by: INTERNAL MEDICINE

## 2022-05-12 PROCEDURE — 80048 BASIC METABOLIC PNL TOTAL CA: CPT | Performed by: INTERNAL MEDICINE

## 2022-05-12 PROCEDURE — 99024 POSTOP FOLLOW-UP VISIT: CPT | Performed by: SURGERY

## 2022-05-12 PROCEDURE — 84132 ASSAY OF SERUM POTASSIUM: CPT | Performed by: INTERNAL MEDICINE

## 2022-05-12 PROCEDURE — 85025 COMPLETE CBC W/AUTO DIFF WBC: CPT | Performed by: INTERNAL MEDICINE

## 2022-05-12 RX ORDER — POTASSIUM CHLORIDE 750 MG/1
40 CAPSULE, EXTENDED RELEASE ORAL ONCE
Status: COMPLETED | OUTPATIENT
Start: 2022-05-12 | End: 2022-05-12

## 2022-05-12 RX ORDER — PANTOPRAZOLE SODIUM 40 MG/1
40 TABLET, DELAYED RELEASE ORAL
Refills: 0 | Status: DISCONTINUED | OUTPATIENT
Start: 2022-05-13 | End: 2022-05-12 | Stop reason: HOSPADM

## 2022-05-12 RX ORDER — INSULIN LISPRO 100 [IU]/ML
8 INJECTION, SOLUTION INTRAVENOUS; SUBCUTANEOUS
Qty: 15 ML | Refills: 12 | Status: SHIPPED | OUTPATIENT
Start: 2022-05-12 | End: 2022-06-13 | Stop reason: SDUPTHER

## 2022-05-12 RX ORDER — FERROUS SULFATE 325(65) MG
325 TABLET ORAL
Qty: 30 TABLET | Refills: 0 | Status: SHIPPED | OUTPATIENT
Start: 2022-05-13 | End: 2022-06-06 | Stop reason: SDUPTHER

## 2022-05-12 RX ORDER — FOLIC ACID 1 MG/1
1 TABLET ORAL DAILY
Qty: 30 TABLET | Refills: 0 | Status: SHIPPED | OUTPATIENT
Start: 2022-05-13 | End: 2022-06-06 | Stop reason: SDUPTHER

## 2022-05-12 RX ORDER — DIPHENOXYLATE HYDROCHLORIDE AND ATROPINE SULFATE 2.5; .025 MG/1; MG/1
1 TABLET ORAL DAILY
Qty: 30 TABLET | Refills: 0 | Status: SHIPPED | OUTPATIENT
Start: 2022-05-13 | End: 2022-06-06 | Stop reason: SDUPTHER

## 2022-05-12 RX ADMIN — POTASSIUM CHLORIDE 40 MEQ: 10 CAPSULE, COATED, EXTENDED RELEASE ORAL at 11:08

## 2022-05-12 RX ADMIN — PANTOPRAZOLE SODIUM 40 MG: 40 INJECTION, POWDER, LYOPHILIZED, FOR SOLUTION INTRAVENOUS at 05:02

## 2022-05-12 RX ADMIN — THIAMINE HCL TAB 100 MG 100 MG: 100 TAB at 08:39

## 2022-05-12 RX ADMIN — POTASSIUM CHLORIDE 40 MEQ: 10 CAPSULE, COATED, EXTENDED RELEASE ORAL at 09:42

## 2022-05-12 RX ADMIN — OXYCODONE HYDROCHLORIDE AND ACETAMINOPHEN 1000 MG: 500 TABLET ORAL at 04:58

## 2022-05-12 RX ADMIN — FERROUS SULFATE TAB 325 MG (65 MG ELEMENTAL FE) 325 MG: 325 (65 FE) TAB at 08:40

## 2022-05-12 RX ADMIN — ENOXAPARIN SODIUM 40 MG: 40 INJECTION SUBCUTANEOUS at 08:39

## 2022-05-12 RX ADMIN — INSULIN DETEMIR 12 UNITS: 100 INJECTION, SOLUTION SUBCUTANEOUS at 08:38

## 2022-05-12 RX ADMIN — INSULIN LISPRO 3 UNITS: 100 INJECTION, SOLUTION INTRAVENOUS; SUBCUTANEOUS at 08:38

## 2022-05-12 RX ADMIN — Medication 5000 UNITS: at 08:39

## 2022-05-12 RX ADMIN — POTASSIUM CHLORIDE 40 MEQ: 750 CAPSULE, EXTENDED RELEASE ORAL at 08:39

## 2022-05-12 RX ADMIN — CYANOCOBALAMIN TAB 1000 MCG 1000 MCG: 1000 TAB at 08:40

## 2022-05-12 RX ADMIN — MULTIVITAMIN TABLET 1 TABLET: TABLET at 08:39

## 2022-05-12 RX ADMIN — INSULIN LISPRO 3 UNITS: 100 INJECTION, SOLUTION INTRAVENOUS; SUBCUTANEOUS at 12:09

## 2022-05-12 RX ADMIN — INSULIN LISPRO 8 UNITS: 100 INJECTION, SOLUTION INTRAVENOUS; SUBCUTANEOUS at 12:08

## 2022-05-12 RX ADMIN — INSULIN LISPRO 8 UNITS: 100 INJECTION, SOLUTION INTRAVENOUS; SUBCUTANEOUS at 08:39

## 2022-05-12 RX ADMIN — FOLIC ACID 1 MG: 1 TABLET ORAL at 08:40

## 2022-05-12 RX ADMIN — OXYCODONE HYDROCHLORIDE AND ACETAMINOPHEN 1000 MG: 500 TABLET ORAL at 08:51

## 2022-05-12 NOTE — DISCHARGE SUMMARY
"Discharge Summary    Patient name: Rain Barreto  CSN: [id number]  MRN: [id number]  : 1985  Today's date: 2022     Date of Admission: 2022    Date of Discharge: 2022    Admitting Physician: STEFANY Willoughby MD    Attending Physician: GANESH Phillip MD    Primary Care Provider: Yolanda Tian APRN    Consultations:   Dr. Adams- Surgery    Diagnoses:   Active Hospital Problems    Diagnosis  POA   • **DKA [E10.10]  Yes   • UTI (urinary tract infection) [N39.0]  Yes   • Proteinuria [R80.9]  Yes   • Morbid exogenous obesity (S/P Lap Sleeve Gastrec jaylyn and Lap Choly 2022) [E66.01]  Yes        History of Present Illness:  36-year-old type I diabetic admitted to the intensive care unit on 2022 with diabetic ketoacidosis     The patient has type 1 diabetes mellitus.  She underwent a lap sleeve gastrectomy and cholecystectomy by Dr. Finnegan on 2022.  Her hospital course was complicated by elevated LFTs.  She underwent an MRCP that did not show bile duct defects or distention.  She subsequently improved and was discharged home on 2022.     The patient has done poorly since discharge.  She has not been able to take in much nutritional supplementation or fluids.  She has had ongoing nausea, vomiting, and abdominal pain.  The narcotic pain medicine prescribed for her was poorly tolerated.  She did not \"like the way it made her feel\".     She has been using \"as needed\" insulin since discharge and for the 2 days prior to her admission she took no insulin at all.  Finally she used 10 units of fast acting insulin this morning.  She was evaluated in Dr. Finnegan's office this morning and told to go to the emergency room where she was found to have a severe metabolic acidosis and a clinical picture consistent with DKA.     Patient has not experienced DKA in the past.  She is a non-smoker and has had no recent alcohol intake    Hospital Course:  Rain Barreto is a 36 y.o. female who " "present[facility] to [facility] as discussed in HPI.  Patient was admitted to ICU and initiated on DKA protocol with IV insulin, and empiric antimicrobials for UTI.  Her anion gap closed quickly and she was able to be transitioned to subcu the evening on hospital day #1.  Her condition continued to improve however she continued to have poor appetite which slowly improved.  Further discussion was had by bariatric surgery with patient emphasizing the importance of 100 g of protein a day.  Additionally, she has been educated on use of insulin by diabetes educator.  She is to follow-up with bariatric surgery in 2 weeks.  Additionally, she is to follow-up with endocrinology June 2nd.    Home insulin regimen:  Keep blood sugar between 100-200  Levemir 12 units twice daily   Lispro 8 units 3 times a day with meals\  Patient will be discharged with glucometer, and test strips.    Vitals:  /98 (BP Location: Right arm, Patient Position: Lying)   Pulse 98   Temp 98 °F (36.7 °C)   Resp 14   Ht 154.9 cm (61\")   Wt 78 kg (172 lb)   LMP  (LMP Unknown)   SpO2 97%   BMI 32.50 kg/m²     Advance Directives: Code Status and Medical Interventions:   Ordered at: 05/09/22 1749     Code Status (Patient has no pulse and is not breathing):    CPR (Attempt to Resuscitate)     Medical Interventions (Patient has pulse or is breathing):    Full Support        Physical Exam:  Telemetry:  Rhythm: normal sinus rhythm (05/12/22 1200)     Constitutional:  No acute distress.   Cardiovascular: RRR.   Normal heart sounds.  No murmurs, gallop or rub.   Respiratory: Normal breath sounds  No adventitious sounds.   Abdominal:  Soft with no tenderness.  No distension.   No HSM.   Extremities: Warm.  Dry.  No cyanosis.  No Edema   Neurological:             Alert, Oriented, Cooperative.  Best Eye Response: 4-->(E4) spontaneous (05/12/22 1200)  Best Motor Response: 6-->(M6) obeys commands (05/12/22 1200)  Best Verbal Response: 5-->(V5) " oriented (05/12/22 1200)  [place] Coma Scale Score: 15 (05/12/22 1200)       Labs:  Hematology:  Results from last 7 days   Lab Units 05/12/22  0426 05/11/22  0303   WBC 10*3/mm3 5.15 5.76   HEMOGLOBIN g/dL 12.3 12.9   MCV fL 82.0 80.6   PLATELETS 10*3/mm3 287 283   NEUTROS ABS 10*3/mm3 2.86 3.99   LYMPHS ABS 10*3/mm3 1.70 1.31   EOS ABS 10*3/mm3 0.09 0.05     Chemistry:  Estimated Creatinine Clearance: 153.5 mL/min (A) (by C-G formula based on SCr of 0.47 mg/dL (L)).  Results from last 7 days   Lab Units 05/12/22  1507 05/12/22 0426 05/11/22  0303   SODIUM mmol/L  --  141 137   POTASSIUM mmol/L 3.8 2.8* 3.7   CHLORIDE mmol/L  --  103 102   CO2 mmol/L  --  25.0 19.0*   BUN mg/dL  --  4* 3*   CREATININE mg/dL  --  0.47* 0.53*   GLUCOSE mg/dL  --  109* 262*     Results from last 7 days   Lab Units 05/12/22  1507 05/12/22 0426 05/11/22  0303 05/10/22  1916 05/10/22  1236 05/10/22  0812 05/10/22  0402 05/10/22  0209 05/09/22  1835   IONIZED CALCIUM mmol/L  --   --   --   --   --   --  1.30  --  1.33*   CALCIUM mg/dL  --  9.3 8.6   < > 9.3   < > 8.6   < > 8.8   MAGNESIUM mg/dL 2.1  --  2.5  --  1.6   < > 1.6   < > 1.9   PHOSPHORUS mg/dL  --   --  3.1  --  1.2*   < > 2.0*   < > 1.2*    < > = values in this interval not displayed.     Results from last 7 days   Lab Units 05/11/22  0303 05/09/22  1835   BILIRUBIN mg/dL 0.5 0.3   AST (SGOT) U/L 10 11   ALT (SGPT) U/L 17 21   ALK PHOS U/L 132* 162*     Cardiac Labs:  Results from last 7 days   Lab Units 05/09/22  1103   CK TOTAL U/L 34   PROBNP pg/mL 55.9   TROPONIN T ng/mL <0.010     Biomarkers:  Results from last 7 days   Lab Units 05/11/22  0303 05/09/22  1835 05/09/22  1103   CRP mg/dL 0.87*  --   --    LACTATE mmol/L 0.8  --  1.4   PROCALCITONIN ng/mL  --  0.06  --      COVID-19  Lab Results   Component Value Date    COVID19 Not Detected 05/09/2022    COVID19 Not Detected 04/26/2022    COVID19 Not Detected 07/26/2021     Arterial Blood Gases:  Results from last 7 days    Lab Units 05/09/22  1639   PH, ARTERIAL pH units 7.164*   PCO2, ARTERIAL mm Hg 21.3*   PO2 ART mm Hg 106.0   FIO2 % 21        Discharge Medications:     Discharge Medications      New Medications      Instructions Start Date   ascorbic acid 1000 MG tablet  Commonly known as: VITAMIN C   1,000 mg, Oral, Every Morning   Start Date: May 13, 2022     cyanocobalamin 1000 MCG tablet  Commonly known as: VITAMIN B-12   1,000 mcg, Oral, Daily   Start Date: May 13, 2022     ferrous sulfate 325 (65 FE) MG tablet   325 mg, Oral, Daily With Breakfast   Start Date: May 13, 2022     folic acid 1 MG tablet  Commonly known as: FOLVITE   1 mg, Oral, Daily   Start Date: May 13, 2022     multivitamin tablet tablet   1 tablet, Oral, Daily   Start Date: May 13, 2022     thiamine 100 MG tablet  tablet  Commonly known as: VITAMIN B-1   100 mg, Oral, Daily   Start Date: May 13, 2022     vitamin D3 125 MCG (5000 UT) capsule capsule   5,000 Units, Oral, Daily   Start Date: May 13, 2022        Changes to Medications      Instructions Start Date   insulin detemir 100 UNIT/ML injection  Commonly known as: LEVEMIR  What changed:   · how much to take  · when to take this   12 Units, Subcutaneous, 2 Times Daily      Insulin Lispro (1 Unit Dial) 100 UNIT/ML solution pen-injector  Commonly known as: HUMALOG  What changed: how much to take   8 Units, Subcutaneous, 3 Times Daily With Meals         Continue These Medications      Instructions Start Date   B-D UF III MINI PEN NEEDLES 31G X 5 MM misc  Generic drug: Insulin Pen Needle   Use as directed four times daily.      cyclobenzaprine 10 MG tablet  Commonly known as: FLEXERIL   10 mg, Oral, 3 Times Daily PRN      omeprazole 40 MG capsule  Commonly known as: priLOSEC   40 mg, Oral, Daily      ondansetron 4 MG tablet  Commonly known as: Zofran   4 mg, Oral, Every 4 Hours PRN      oxyCODONE 5 MG immediate release tablet  Commonly known as: Roxicodone   5 mg, Oral, Every 4 Hours PRN         Stop These  Medications    promethazine 12.5 MG tablet  Commonly known as: PHENERGAN            Discharge Diet:   Bariatric diet, 200 g protein per day    Activity at Discharge:   With assistance    Follow-up Appointments  Future Appointments   Date Time Provider Department [place]   5/31/2022  9:30 AM Bridget Andino PA [facility] None   6/2/2022  1:45 PM Hanh Garza APRN [facility]     Additional Instructions for the Follow-ups that You Need to Schedule     Discharge Follow-up with PCP   As directed       Currently Documented PCP:    Yolanda Tian APRN    PCP Phone Number:    [phone]     Follow Up Details: ASAP for transition of care.             Discharge Instructions:  Home with assistance  Follow-up as above       SATHISH Jordan, [facility]  Pulmonology and Critical Care Medicine  Electronically signed by SATHISH Benavidez, 05/12/22, 4:09 PM EDT.       Time: I spent 20 minutes on this discharge activity which included: face-to-face encounter with the patient, reviewing the data in the system, coordination of the care with the nursing staff as well as consultants, documentation, and entering orders.       CC: Yolanda Tian APRN
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
## 2022-05-12 NOTE — PLAN OF CARE
Goal Outcome Evaluation:  Plan of Care Reviewed With: patient        Progress: improving  Outcome Evaluation: VSS overnight. Blood glucose 143 at bedtime. Remains in good spirits. Appears to have rested well.

## 2022-05-12 NOTE — PROGRESS NOTES
Clinical Nutrition     Reason for Visit: MDR, Follow-up protocol, Need for education    Patient Name: Rain Barreto  YOB: 1985  MRN: [id number]  Date of Encounter: 22 12:09 EDT  Admission date: 2022    Nutrition Assessment   Admission Problem List:    DKA    Morbid exogenous obesity (S/P Lap Sleeve Gastrec jaylyn and Lap Choly 2022)    UTI (urinary tract infection)    Proteinuria      PMH:   She  has a past medical history of Anxiety, Depression, Diabetes mellitus (HCC), Diverticulosis, Dyspepsia, Gallstones, Heartburn, HLD (hyperlipidemia), Hypertension, Kidney infection, Kidney stone, Migraine, MRSA carrier,  (normal spontaneous vaginal delivery), PONV (postoperative nausea and vomiting), Tattoos, Wears dentures, and Wheezing.  PSxH:  She  has a past surgical history that includes  section (); d & c hysteroscopy endometrial ablation (); Tubal ligation (); Teeth Extraction; Esophagogastroduodenoscopy (N/A, 2019); Colonoscopy (N/A, 04/15/2019); Total abdominal hysterectomy ();  section ();  section (); Gastric Sleeve (N/A, 2022); Esophagogastroduodenoscopy (N/A, 2022); and Cholecystectomy (N/A, 2022).    Substance history: Tobacco remote    Reported/Observed/Food/Nutrition Related History:     Pt resting in bed, reports feeling a lot better, is tolerating full liquids, and apparently also got some eggs this morning which she tolerated, drank ~half of her premier protein, is swallowing better    Anthropometrics   Height: 61in  Weight: 177lb  BMI: 32  BMI classification: Obese Class I: 30-34.9kg/m2     ~20lb wt loss since surgery           Last 15 Recorded Weights  View Complete Flowsheet  Weight Weight (kg) Weight (lbs) Weight Method VISIT REPORT   2022 78.019 kg 172 lb Stated Report   2022 78.245 kg 172 lb 8 oz - Report   2022 95.709 kg 211 lb Bed scale -   2022 87.317 kg 192  lb 8 oz - -   3/1/2022 87.317 kg 192 lb 8 oz - -   8/26/2021 87.454 kg 192 lb 12.8 oz - Report   7/30/2021 87.091 kg 192 lb Stated -   6/29/2021 87.091 kg 192 lb - Report   4/11/2019 85.276 kg 188 lb Stated -   3/19/2019 85.276 kg 188 lb Stated -   3/12/2019 85.276 kg 188 lb - Report   4/1/2016 78.93 kg 174 lb 0.2 oz - -         Labs reviewed   Results from last 7 days   Lab Units 05/12/22  0426 05/11/22  0303   SODIUM mmol/L 141 137   POTASSIUM mmol/L 2.8* 3.7   CHLORIDE mmol/L 103 102   CO2 mmol/L 25.0 19.0*   BUN mg/dL 4* 3*   CREATININE mg/dL 0.47* 0.53*   CALCIUM mg/dL 9.3 8.6   BILIRUBIN mg/dL  --  0.5   ALK PHOS U/L  --  132*   ALT (SGPT) U/L  --  17   AST (SGOT) U/L  --  10   GLUCOSE mg/dL 109* 262*       Results from last 7 days   Lab Units 05/12/22  1159 05/12/22  0737 05/11/22  1959 05/11/22  1807 05/11/22  1605 05/11/22  1135   GLUCOSE mg/dL 212* 210* 143* 212* 83 186*     Lab Results   Lab Value Date/Time    HGBA1C 10.80 (H) 05/09/2022 1835    HGBA1C 11.60 (H) 04/26/2022 1258       Medications reviewed   Pertinent:lovenox, thiamine, vitamin C, iron, folate, MVI, B-12, vitamin D, protonix, insulin    Intake/Ouptut 24 hrs (7:00AM - 6:59 AM)     Intake & Output (last day)       05/11 0701 05/12 0700 05/12 0701 05/13 0700    P.O.  690    I.V. (mL/kg)      IV Piggyback      Total Intake(mL/kg)  690 (8.8)    Urine (mL/kg/hr)      Total Output      Net  +690          Urine Unmeasured Occurrence 2 x 1 x    Stool Unmeasured Occurrence 2 x 1 x               GI:abdomen soft, tender    SKIN: surgical site    Current Nutrition Prescription     PO: Diet Bariatric; Stage 2 Full Liquid Sugar Free (No Carbonation, No Straw)   Premier Protin 3x/day    Intake: 33% of 3 meals, 50% of breakfast documented    Nutrition Diagnosis   Date: 5-10-22, 5-12  Problem Food and nutrition knowledge deficit   Etiology Uncontrolled DM   Signs/Symptoms Ha1c 10.8     Problem Altered GI function   Etiology s/p Sleeve Gastrectomy    Signs/Symptoms N/V,Abdominal pain     Nutrition Intervention   1.  Follow treatment progress, Advised available snacks, Interview for preferences, Menu provided, Menu adjusted, Supplement provided, Education provided    Reviewed DM Education    Ensourage protein intake, 6 small meals per day    Goal:   General: Nutrition support treatment  PO: Increase intake    Additional goals: Education    Monitoring/Evaluation:   Per protocol, I&O, PO intake, Supplement intake, Pertinent labs, Weight, Skin status, GI status, Symptoms, Swallow function    Lulu Marin, DWIGHT  Time Spent:30min

## 2022-05-12 NOTE — NURSING NOTE
Pt and primary RN communicated about all discharge instructions at the bedside. Pt received all of her take-home medications from [facility] pharmacy and pt verbalized understanding of all medication administration instructions and side-effects.   Follow-up appointment to PCP was unable to be made d/t office being closed. Pt verbalized understanding to call PCP tomorrow morning and schedule follow-up appointment.     Pt left 2A ICU at  Approximately 1800 via wheelchair accompanied by her spouse and transport personnel.

## 2022-05-12 NOTE — PROGRESS NOTES
"Cc: DKA  \"want to go home\"    She is alone in the room.  She looks and feels well and wants to go home.  Mild postoperative abdominal soreness otherwise no GI complaints.  No fever pulse 98 blood pressure 115/91.  Abdomen is soft and benign wounds healed.  Discussed with Dr. Phillip who feels she is ready to go home from his standpoint.  I agree.  I once again emphasized the importance of getting in 100 g of protein a day and she says she will comply.  Follow-up in the office approximately 2 weeks for her 1 month postoperative visit and call in the meantime with any problems questions or concerns.  Avoid ulcerogenic agents for another 6 weeks.  "

## 2022-05-12 NOTE — PROGRESS NOTES
INTENSIVIST   PROGRESS NOTE     Hospital:  LOS: 3 days      FUAD Rodrigez 36 y.o. female is followed for: Abdominal Pain       DKA    UTI (urinary tract infection)    Proteinuria    As an Intensivist, we provide an integrated approach to the ICU patient and family, medical management of comorbid conditions, including but not limited to electrolytes, glycemic control, organ dysfunction, lead interdisciplinary rounds and coordinate the care with all other services, including those from other specialists.     Interval History:  Eating more.  She wants to go home.    The patient has started, but not completed, their COVID-19 vaccination series.     Temp  Min: 98 °F (36.7 °C)  Max: 98.5 °F (36.9 °C)       History     Last Reviewed by Roland Phillip MD on 5/10/2022 at  7:48 AM    Sections Reviewed    Medical, Family, Surgical, Tobacco, Alcohol, Drug Use, Sexual Activity,   Social Documentation      Problem list reviewed by Roland Phillip MD on 5/10/2022 at  7:48 AM  Medicines reviewed by Roland Phillip MD on 5/10/2022 at  7:48 AM  Allergies reviewed by Roland Phillip MD on 5/10/2022 at  7:48 AM       The patient's relevant past medical, surgical and social history were reviewed and updated in Epic as appropriate.        O     Vitals:  Temp: 98 °F (36.7 °C) (05/12/22 1200) Temp  Min: 98 °F (36.7 °C)  Max: 98.5 °F (36.9 °C)   Temp core:      BP: 132/88 (05/12/22 1300) BP  Min: 94/77  Max: 150/88   Pulse: 105 (05/12/22 1345) Pulse  Min: 80  Max: 121   Resp: 14 (05/12/22 1200) Resp  Min: 14  Max: 20   SpO2: 96 % (05/12/22 1345) SpO2  Min: 94 %  Max: 100 %   Device: room air (05/12/22 1200)    Flow Rate:   No data recorded     Intake/Ouptut 24 hrs (7:00AM - 6:59 AM)  Intake & Output (last 3 days)       05/09 0701  05/10 0700 05/10 0701  05/11 0700 05/11 0701 05/12 0700 05/12 0701 05/13 0700    P.O. 400   690    I.V. (mL/kg) 1205 (15.4) 1554.4 (19.9)      IV Piggyback 1000 700      Total Intake(mL/kg) 2605 (33.4) 2254.4  (28.9)  690 (8.8)    Urine (mL/kg/hr) 2050 2875 (1.5)      Total Output 2050 2875      Net +555 -620.6  +690            Urine Unmeasured Occurrence   2 x 1 x    Stool Unmeasured Occurrence   2 x 1 x          Wt Readings from Last 3 Encounters:   05/09/22 78 kg (172 lb)   05/09/22 78.2 kg (172 lb 8 oz)   04/28/22 95.7 kg (211 lb)     Physical Examination  Telemetry:  Rhythm: normal sinus rhythm (05/12/22 1200)         Constitutional:  No acute distress.   Cardiovascular: RRR.   Normal heart sounds.  No murmurs, gallop or rub.   Respiratory: Normal breath sounds  No adventitious sounds.   Abdominal:  Soft with no tenderness.  No distension.   No HSM.   Extremities: Warm.  Dry.  No cyanosis.  No Edema   Neurological:   Alert, Oriented, Cooperative.  Best Eye Response: 4-->(E4) spontaneous (05/12/22 1200)  Best Motor Response: 6-->(M6) obeys commands (05/12/22 1200)  Best Verbal Response: 5-->(V5) oriented (05/12/22 1200)  Fairchance Coma Scale Score: 15 (05/12/22 1200)     Results Reviewed:  Laboratory  Microbiology  Radiology  Pathology    Hematology:  Results from last 7 days   Lab Units 05/12/22  0426 05/11/22  0303   WBC 10*3/mm3 5.15 5.76   HEMOGLOBIN g/dL 12.3 12.9   MCV fL 82.0 80.6   PLATELETS 10*3/mm3 287 283   NEUTROS ABS 10*3/mm3 2.86 3.99   LYMPHS ABS 10*3/mm3 1.70 1.31   EOS ABS 10*3/mm3 0.09 0.05     Chemistry:  Estimated Creatinine Clearance: 153.5 mL/min (A) (by C-G formula based on SCr of 0.47 mg/dL (L)).    Results from last 7 days   Lab Units 05/12/22  0426 05/11/22  0303 05/10/22  1916 05/10/22  1236 05/10/22  0812 05/10/22  0402   GLUCOSE mg/dL 109* 262* 150* 155* 161* 139*   SODIUM mmol/L 141 137 136 135* 135* 136   POTASSIUM mmol/L 2.8* 3.7 3.2* 3.8 3.6 3.0*   CHLORIDE mmol/L 103 102 106 107 108* 108*   CO2 mmol/L 25.0 19.0* 18.0* 15.0* 13.0* 13.0*   BUN mg/dL 4* 3* 3* 3* 4* 4*   CREATININE mg/dL 0.47* 0.53* 0.54* 0.46* 0.47* 0.52*   ANION GAP mmol/L 13.0 16.0* 12.0 13.0 14.0 15.0       Results from  last 7 days   Lab Units 05/11/22  0303 05/10/22  1236 05/10/22  0812 05/10/22  0402 05/10/22  0209 05/09/22  1835   MAGNESIUM mg/dL 2.5 1.6 1.7 1.6 1.8 1.9   PHOSPHORUS mg/dL 3.1 1.2* 1.7* 2.0* 1.9* 1.2*   IONIZED CALCIUM mmol/L  --   --   --  1.30  --  1.33*       Results from last 7 days   Lab Units 05/12/22  0426 05/11/22  0303 05/10/22  1236   BETA-HYDROXYBUTYRATE QUANT mmol/L 2.536* 2.983* 2.077*       Cardiac Labs:  Results from last 7 days   Lab Units 05/09/22  1103   CK TOTAL U/L 34   PROBNP pg/mL 55.9   TROPONIN T ng/mL <0.010     Biomarkers:  Results from last 7 days   Lab Units 05/11/22  0303 05/09/22  1835 05/09/22  1103   CRP mg/dL 0.87*  --   --    LACTATE mmol/L 0.8  --  1.4   PROCALCITONIN ng/mL  --  0.06  --      COVID-19  Lab Results   Component Value Date    COVID19 Not Detected 05/09/2022    COVID19 Not Detected 04/26/2022    COVID19 Not Detected 07/26/2021       Images:  No radiology results for the last day    Echo:  Results for orders placed during the hospital encounter of 09/21/21    Adult Transthoracic Echo Complete W/ Cont if Necessary Per Protocol    Interpretation Summary  · Normal left ventricular cavity size and wall thickness  · Left ventricular ejection fraction appears to be 61 - 65%.  · Left ventricular diastolic function was normal.  · No aortic valve regurgitation or stenosis is present.  · Trace to mild mitral valve regurgitation is present.  · Physiologic tricuspid valve regurgitation is present.    Recommendation.    Cleared to undergo surgery with usual precaution.      Results: Reviewed.  I reviewed the patient's new laboratory and imaging results.  I independently reviewed the patient's new images.    Medications: Reviewed.    Assessment     A / P     Rain is a 36 y.o. female admitted on 5/9/2022 with Diabetic ketoacidosis without coma associated with type 1 diabetes mellitus (HCC) [E10.10]:    1. DKA  2. Obesity Body mass index is 32.5 kg/m².  1. S/P Lap Sleeve  gastrectomy 04/28/2022  3. Dyslipidemia     Results from last 7 days   Lab Units 05/12/22  1159 05/12/22  0737 05/11/22  1959 05/11/22  1807 05/11/22  1605 05/11/22  1135   GLUCOSE mg/dL 212* 210* 143* 212* 83 186*     Lab Results   Lab Value Date/Time    HGBA1C 10.80 (H) 05/09/2022 1835    HGBA1C 11.60 (H) 04/26/2022 1258       Nutrition Support: Patient isn't on Tube Feeding   Modulars: Patient doesn't have any tube feeding modular orders   Diet: Diet Bariatric; Stage 2 Full Liquid Sugar Free (No Carbonation, No Straw)   Advance Directives: Code Status and Medical Interventions:   Ordered at: 05/09/22 1743     Code Status (Patient has no pulse and is not breathing):    CPR (Attempt to Resuscitate)     Medical Interventions (Patient has pulse or is breathing):    Full Support        Management Plan:    1. Replace K. Check Mg today.  1. Repeat labs this afternoon, if better may go home today.  2. Disposition: Discharge Home } if OK with bariatric surgery and pending follow up labs.  1. She will go home with long acting insulin as well as prandial insulin  1. Levemir 12 units BID  2. Lispro 8 units TID with meals  2. She is using a Dexcom      Plan of care and goals reviewed during interdisciplinary rounds.  I discussed the patient's findings and my recommendations with patient    Level of Risk is High due to:  illness with threat to life or bodily function.     Time: 25 minutes, in direct patient care, with the patient and/or on the arguello coordinating care with other health care providers.     I have spent > 50% percent of this time, counseling and discussing management.     [x]  Primary Attending  []  Consultant

## 2022-05-12 NOTE — CONSULTS
"Diabetes Education    Patient Name:  Rain Barreto  YOB: 1985  MRN: [id number]  Admit Date:  5/9/2022        Saw pt at bedside for diabetes education consult--pt gave permission for visit. Chart reviewed, noted pt was also seen by educator on 5/10 as well as during recent admission.  Pt history includes diagnosis with type 1 diabetes in her 20's. She has been following with PCP and states she has never seen an endocrinologist. Prior to surgery she was wearing an omnipod dash insulin pump along with dexcom G6 cgm with very aggressive insulin settings. She experienced hypoglycemia, pt reports \"in the 20s\" the morning of surgery. After surgery, she was sent home w/ levemir and humalog via injection because of her previous insulin pump settings, see note from diabetes educator from that admission for details--pt was told to contact her PCP for adjustment in her pump settings because of the drastic change in her diet after surgery and likely very different insulin needs than prior to surgery. Pt states she \"doesn't remember much about being home\". She states she did not take any long acting insulin, \"I didn't know I was supposed to\", and took humalog \"as needed\". Previously, with her pump, she had an insulin to carbohydrate ratio; she thought that if she wasn't eating any carbohydrates she didn't necessarily need the humalog so was not taking consistently, \"only if I was high\". Pt states she has not had DKA before.  Explained DKA including pathophysiology, s/sx, treatment, and risks if not treated. Discussed that with type 1 diabetes insulin is not produced by the body so insulin must be given externally via injections, pump, etc. Explained to pt that she cannot go without insulin, even if she is not eating. Reinforced that she needs to take long acting insulin regardless of her eating status. Discussed that rapid acting insulin is used not only to cover carbohydrates when eating but also to correct " high blood sugar (reviewed how this happened with her pump using the insulin sensitivity/correction factor set in pump). Pt plans to transition back to pump but that will be after she sees endocrinology on June 2 as she states her PCP doesn't feel comfortable making setting changes. Recommended she write down all insulin dosing from d/c until she sees endo so insulin use can be reviewed. Pt continues to wear dexcom G6 so glucose data will be available for endo to review as well. Provided pt w/ [facility] educational material on DKA, reviewed s/sx and importance of returning to ER if she has any s/sx again. Pt has OP diabetes education contact information. Referral request was sent to her PCP for OP education after her last hospital admission.   Thank you for the consult.       Electronically signed by:  Maryana Holden RN, [facility]  05/12/22 14:59 EDT

## 2022-05-14 LAB
BACTERIA SPEC AEROBE CULT: NORMAL
BACTERIA SPEC AEROBE CULT: NORMAL

## 2022-05-24 ENCOUNTER — TRANSCRIBE ORDERS (OUTPATIENT)
Dept: DIABETES SERVICES | Facility: HOSPITAL | Age: 37
End: 2022-05-24

## 2022-05-24 DIAGNOSIS — E10.8 TYPE I DIABETES MELLITUS WITH COMPLICATION: Primary | ICD-10-CM

## 2022-06-02 ENCOUNTER — SPECIALTY PHARMACY (OUTPATIENT)
Dept: PHARMACY | Facility: HOSPITAL | Age: 37
End: 2022-06-02

## 2022-06-02 ENCOUNTER — OFFICE VISIT (OUTPATIENT)
Dept: ENDOCRINOLOGY | Facility: CLINIC | Age: 37
End: 2022-06-02

## 2022-06-02 VITALS
WEIGHT: 182 LBS | SYSTOLIC BLOOD PRESSURE: 148 MMHG | DIASTOLIC BLOOD PRESSURE: 98 MMHG | OXYGEN SATURATION: 98 % | BODY MASS INDEX: 34.36 KG/M2 | HEIGHT: 61 IN | HEART RATE: 79 BPM

## 2022-06-02 DIAGNOSIS — E10.65 TYPE 1 DIABETES MELLITUS WITH HYPERGLYCEMIA: Primary | ICD-10-CM

## 2022-06-02 LAB — GLUCOSE BLDC GLUCOMTR-MCNC: 192 MG/DL (ref 70–130)

## 2022-06-02 PROCEDURE — 95251 CONT GLUC MNTR ANALYSIS I&R: CPT | Performed by: NURSE PRACTITIONER

## 2022-06-02 PROCEDURE — 99214 OFFICE O/P EST MOD 30 MIN: CPT | Performed by: NURSE PRACTITIONER

## 2022-06-02 RX ORDER — ESTRADIOL 1 MG/1
1 TABLET ORAL DAILY
COMMUNITY
Start: 2022-05-31 | End: 2022-11-14

## 2022-06-02 NOTE — PROGRESS NOTES
"Chief Complaint   Patient presents with   • Diabetes   HPI       Rain Barreto is a 36 y.o. female had concerns including Diabetes.    Seen as a new patient.    On  she went in for gastric sleeve and cholecystectomy.  She forgot to suspend her omnipod pump and had a severe hypoglycemia during her surgical procedure and postop.  She has had some significant complications since surgery.  She was found to be in DKA at that time and was hospitalized.  She since has been on injections since her surgery.  She would like to go back on her Omnipod pump.      Diabetes was diagnosed .  Complications include neuropathy.  Last ophtho exam was 2022, [place] at [facility].  Current medications for diabetes include Levemir 12 units BID, Humalog 8 units TID with meals.  She checks her blood sugar dexcom times per day.   Hypos: feels like daily since her surgery    The following portions of the patient's history were reviewed and updated as appropriate: allergies, current medications, past family history, past medical history, past social history, past surgical history and problem list.    Diet: She is very limited due to her recent gastric bypass surgery.    Prior Omnipod settings:   Basal:  12A: 2.25  7A: 2.05  5P: 2.25  CF: 1:11  CR: 1:3    Past Medical History:   Diagnosis Date   • Anxiety    • Depression    • Diabetes mellitus (HCC)     Type I, has detachable pump. dx in 's   • Diverticulosis    • Dyspepsia    • Gallstones    • Heartburn     takes prn zantac and tums, EGD  [facility], EGD Dr. Finnegan 21   • HLD (hyperlipidemia)    • Hypertension     denies   • Kidney infection    • Kidney stone    • Migraine    • MRSA carrier     PAT (+) 22   •  (normal spontaneous vaginal delivery)     1st child w/ preeclampsia   • PONV (postoperative nausea and vomiting)    • Tattoos    • Wears dentures     upper   • Wheezing     \"seasonal allergies\" uses inhalers prn     Past Surgical History: "   Procedure Laterality Date   •  SECTION     •  SECTION     •  SECTION     • CHOLECYSTECTOMY N/A 2022    Procedure: CHOLECYSTECTOMY LAPAROSCOPIC;  Surgeon: Yunior Finnegan MD;  Location:  [facility];  Service: General;  Laterality: N/A;   • COLONOSCOPY N/A 04/15/2019    Procedure: COLONOSCOPY W/ COLD FORCEP BIOPSIES; COLD SNARE POLYPECTOMY; COLD FORCEP POLYPECTOMY; RESOLUTION CLIP X1;  Surgeon: Adán Pena MD;  Location: [facility] ENDOSCOPY;  Service: Gastroenterology   • D & C HYSTEROSCOPY ENDOMETRIAL ABLATION     • ENDOSCOPY N/A 2019    Procedure: ESOPHAGOGASTRODUODENOSCOPY with cold biopsies and dilation;  Surgeon: Adán Pena MD;  Location: [facility] ENDOSCOPY;  Service: Gastroenterology   • ENDOSCOPY N/A 2022    Procedure: ESOPHAGOGASTRODUODENOSCOPY;  Surgeon: Yunior Finnegan MD;  Location:  [facility];  Service: Bariatric;  Laterality: N/A;   • GASTRIC SLEEVE LAPAROSCOPIC N/A 2022    Procedure: GASTRIC SLEEVE LAPAROSCOPIC;  Surgeon: Yunior Finnegan MD;  Location:  [facility];  Service: Bariatric;  Laterality: N/A;   • TEETH EXTRACTION      dentures on upper plate   • TOTAL ABDOMINAL HYSTERECTOMY      low transverse.  [facility] - lap converted to open for scar tissue   • TUBAL ABDOMINAL LIGATION        Family History   Problem Relation Age of Onset   • Colon cancer Paternal Uncle    • Colon cancer Maternal Grandmother    • Crohn's disease Maternal Grandmother    • Diabetes Maternal Grandmother    • Colon cancer Paternal Grandmother    • Other Paternal Grandmother         Colitis   • Diabetes Paternal Grandmother    • Hyperlipidemia Paternal Grandmother    • Hypertension Paternal Grandmother    • Sleep apnea Paternal Grandmother    • Colon cancer Paternal Grandfather    • Diabetes Paternal Grandfather    • Hypertension Paternal Grandfather    • Hypertension Mother    • Cancer Mother    • Diabetes Maternal Grandfather    •  Hypertension Maternal Grandfather    • Stroke Maternal Grandfather    • No Known Problems Brother    • No Known Problems Son    • No Known Problems Son    • No Known Problems Daughter    • No Known Problems Daughter       Social History     Socioeconomic History   • Marital status:    Tobacco Use   • Smoking status: Former Smoker     Packs/day: 0.25     Years: 9.00     Pack years: 2.25     Types: Cigarettes     Quit date:      Years since quittin.4   • Smokeless tobacco: Never Used   Vaping Use   • Vaping Use: Never used   Substance and Sexual Activity   • Alcohol use: Not Currently     Comment: social   • Drug use: No   • Sexual activity: Defer      Allergies   Allergen Reactions   • Aspirin Unknown - High Severity     Please avoid all IV and oral NSAIDs until after 2022 for high risk of life-threatening delayed gastric leak.  Please contact Bariatric Surgery service if there are questions.   • Morphine Itching   • Prednisone Other (See Comments)     Please avoid all IV, intraarticular, intramuscular, and oral steroids until after 2022 for high risk of life-threatening delayed gastric leak.  Please contact Bariatric Surgery service if there are questions.   • Toradol [Ketorolac Tromethamine] Other (See Comments)     Please avoid all IV and oral NSAIDs until after 2022 for high risk of life-threatening delayed gastric leak.  Please contact Bariatric Surgery service if there are questions.   • Adhesive Tape Dermatitis     Luisa. Tele electrodes   • Hydrocodone Itching and Rash   • Jardiance [Empagliflozin] Itching and Rash   • Latex Rash   • Trulicity [Dulaglutide] Nausea Only      Current Outpatient Medications on File Prior to Visit   Medication Sig Dispense Refill   • ascorbic acid (VITAMIN C) 1000 MG tablet Take 1 tablet by mouth Every Morning for 30 days. 30 tablet 0   • cyanocobalamin (VITAMIN B-12) 1000 MCG tablet Take 1 tablet by mouth Daily for 30 days. 30 tablet 0   •  "cyclobenzaprine (FLEXERIL) 10 MG tablet Take 1 tablet by mouth 3 (Three) Times a Day As Needed for Muscle Spasms. 21 tablet 0   • ferrous sulfate 325 (65 FE) MG tablet Take 1 tablet by mouth Daily With Breakfast for 30 days. 30 tablet 0   • folic acid (FOLVITE) 1 MG tablet Take 1 tablet by mouth Daily for 30 days. 30 tablet 0   • insulin detemir (LEVEMIR) 100 UNIT/ML injection Inject 12 Units under the skin into the appropriate area as directed 2 (Two) Times a Day. 15 mL 12   • Insulin Lispro, 1 Unit Dial, (HUMALOG) 100 UNIT/ML solution pen-injector Inject 8 Units under the skin into the appropriate area as directed 3 (Three) Times a Day With Meals. 15 mL 12   • Insulin Pen Needle (B-D UF III MINI PEN NEEDLES) 31G X 5 MM misc Use as directed four times daily. 200 each 3   • multivitamin (THERAGRAN) tablet tablet Take 1 tablet by mouth Daily for 30 days. 30 tablet 0   • omeprazole (priLOSEC) 40 MG capsule Take 1 capsule by mouth Daily for 60 days. 60 capsule 0   • ondansetron (Zofran) 4 MG tablet Take 1 tablet by mouth Every 4 (Four) Hours As Needed for Nausea. 20 tablet 0   • thiamine (VITAMIN B-1) 100 MG tablet  tablet Take 1 tablet by mouth Daily for 30 days. 30 tablet 0   • vitamin D3 125 MCG (5000 UT) capsule capsule Take 1 capsule by mouth Daily for 30 days. 30 capsule 0     No current facility-administered medications on file prior to visit.        Review of Systems   Constitutional: Positive for fatigue. Negative for unexpected weight gain and unexpected weight loss.   Eyes: Negative.    Endocrine: Negative for polydipsia, polyphagia and polyuria.   Psychiatric/Behavioral: Negative for sleep disturbance.   All other systems reviewed and are negative.    /98 (BP Location: Right arm, Patient Position: Sitting, Cuff Size: Adult)   Pulse 79   Ht 154.9 cm (61\")   Wt 82.6 kg (182 lb)   LMP  (LMP Unknown)   SpO2 98%   BMI 34.39 kg/m²      Physical Exam  Vitals reviewed.   Constitutional:       " Appearance: Normal appearance.   Eyes:      Extraocular Movements: Extraocular movements intact.   Neck:      Thyroid: No thyroid mass, thyromegaly or thyroid tenderness.      Comments: No palpable nodules.  Cardiovascular:      Rate and Rhythm: Normal rate and regular rhythm.      Pulses: Normal pulses.      Heart sounds: Normal heart sounds.   Pulmonary:      Effort: Pulmonary effort is normal.      Breath sounds: Normal breath sounds.   Skin:     General: Skin is warm and dry.   Neurological:      Mental Status: She is alert and oriented to person, place, and time.   Psychiatric:         Mood and Affect: Mood normal.         Behavior: Behavior normal.         Thought Content: Thought content normal.         Judgment: Judgment normal.         CMP:  Lab Results   Component Value Date     (H) 07/30/2021    BUN 4 (L) 05/12/2022    CREATININE 0.47 (L) 05/12/2022    EGFRIFNONA 104 02/21/2022    EGFRIFAFRI >60 07/30/2021    BCR 8.5 05/12/2022     05/12/2022    K 3.8 05/12/2022    CO2 25.0 05/12/2022    CALCIUM 9.3 05/12/2022    PROTENTOTREF 7.7 06/29/2021    ALBUMIN 3.60 05/11/2022    LABGLOBREF 2.9 06/29/2021    LABIL2 1.7 06/29/2021    BILITOT 0.5 05/11/2022    ALKPHOS 132 (H) 05/11/2022    AST 10 05/11/2022    ALT 17 05/11/2022     Lipid Panel:  Lab Results   Component Value Date    TRIG 230 (H) 06/29/2021    HDL 56 06/29/2021    VLDL 37 06/29/2021    LDL 68 06/29/2021     HbA1c:  Lab Results   Component Value Date    HGBA1C 10.80 (H) 05/09/2022    HGBA1C 11.60 (H) 04/26/2022     Glucose:  Lab Results   Component Value Date    POCGLU 192 (A) 06/02/2022     Microalbumin:  No results found for: MALBCRERATIO  TSH:  Lab Results   Component Value Date    TSH 1.450 06/29/2021       Assessment and Plan    Diagnoses and all orders for this visit:    1. Type 1 diabetes mellitus with hyperglycemia (HCC) (Primary)  Assessment & Plan:  -Diabetes is above goal with A1c 10.8.  -Discussed importance of yearly diabetic  eye exams.  -Discussed importance of checking blood sugars regularly.  Using Dexcom CGM.  2-week data download shows the following:  Very high: 37%   high: 36%  In range: 26%  Low: <0%  Very low 0%  Trends shows hyperglycemia with significant postprandial hyperglycemia noted.  Due to overall hyperglycemia medication adjustment is warranted.  -Increase Levemir to 14 units twice daily.  -Increase Humalog 10 units 3 times daily with meals.  -We will get patient set up with [employer] rep for retraining on her pump.  -Follow-up in 1 month.        Orders:  -     POC Glucose Fingerstick       Return in about 1 month (around 7/2/2022) for Follow-up appointment. The patient was instructed to contact the clinic with any interval questions or concerns.        This document has been electronically signed by SATHISH Fitzpatrick  Hodan 3, 2022 08:49 EDT   Endocrinology    Please note that portions of this document were completed with a voice recognition program. Efforts were made to edit the dictations, but occasionally words are mis-transcribed.

## 2022-06-02 NOTE — ASSESSMENT & PLAN NOTE
-Diabetes is above goal with A1c 10.8.  -Discussed importance of yearly diabetic eye exams.  -Discussed importance of checking blood sugars regularly.  Using Dexcom CGM.  2-week data download shows the following:  Very high: 37%   high: 36%  In range: 26%  Low: <0%  Very low 0%  Trends shows hyperglycemia with significant postprandial hyperglycemia noted.  Due to overall hyperglycemia medication adjustment is warranted.  -Increase Levemir to 14 units twice daily.  -Increase Humalog 10 units 3 times daily with meals.  -We will get patient set up with [employer] rep for retraining on her pump.  -Follow-up in 1 month.

## 2022-06-02 NOTE — PROGRESS NOTES
Specialty Pharmacy Patient Management Program  Endocrinology Initial Assessment       Rain Barreto is a 36 y.o. female with Type 1 Diabetes seen by an Endocrinology provider and enrolled in the Endocrinology Patient Management program offered by [facility].  An initial outreach was conducted, including assessment of therapy appropriateness and specialty medication education for insulin therapy and Dexcom. The patient was introduced to services offered by [facility], including: regular assessments, refill coordination, curbside pick-up or mail order delivery options, prior authorization maintenance, and financial assistance programs as applicable. The patient was also provided with contact information for the pharmacy team.     Patient is currently taking Humalog 8 units three times daily and Levemir 12 units in the morning and 12 units at night. She had gallbladder and sleeve surgery a little over a month ago. She uses Dexcom to monitor BG with normal range ~150. She reports having low BG at night. She wants to start back on her Omnipod pump.       Relevant Past Medical History and Comorbidities  Relevant medical history and concomitant health conditions were discussed with the patient. The patient's chart has been reviewed for relevant past medical history and comorbid health conditions and updated as necessary.   Past Medical History:   Diagnosis Date   • Anxiety    • Depression    • Diabetes mellitus (HCC)     Type I, has detachable pump. dx in 's   • Diverticulosis    • Dyspepsia    • Gallstones    • Heartburn     takes prn zantac and tums, EGD  chavo shirley, EGD Dr. Finnegan 21   • HLD (hyperlipidemia)    • Hypertension     denies   • Kidney infection    • Kidney stone    • Migraine    • MRSA carrier     PAT (+) 22   •  (normal spontaneous vaginal delivery)     1st child w/ preeclampsia   • PONV (postoperative nausea and vomiting)   "  • Tattoos    • Wears dentures     upper   • Wheezing     \"seasonal allergies\" uses inhalers prn     Social History     Socioeconomic History   • Marital status:    Tobacco Use   • Smoking status: Former Smoker     Packs/day: 0.25     Years: 9.00     Pack years: 2.25     Types: Cigarettes     Quit date:      Years since quittin.4   • Smokeless tobacco: Never Used   Vaping Use   • Vaping Use: Never used   Substance and Sexual Activity   • Alcohol use: Not Currently     Comment: social   • Drug use: No   • Sexual activity: Defer       Allergies  Known allergies and reactions were discussed with the patient. The patient's chart has been reviewed for  allergy information and updated as necessary.   Aspirin, Morphine, Prednisone, Toradol [ketorolac tromethamine], Adhesive tape, Hydrocodone, Jardiance [empagliflozin], Latex, and Trulicity [dulaglutide]    Current Medication List  This medication list has been reviewed with the patient and evaluated for any interactions or necessary modifications/recommendations, and updated to include all prescription medications, OTC medications, and supplements the patient is currently taking.  This list reflects what is contained in the patient's profile, which has also been marked as reviewed to communicate to other providers it is the most up to date version of the patient's current medication therapy.     Current Outpatient Medications:   •  ascorbic acid (VITAMIN C) 1000 MG tablet, Take 1 tablet by mouth Every Morning for 30 days., Disp: 30 tablet, Rfl: 0  •  cyanocobalamin (VITAMIN B-12) 1000 MCG tablet, Take 1 tablet by mouth Daily for 30 days., Disp: 30 tablet, Rfl: 0  •  cyclobenzaprine (FLEXERIL) 10 MG tablet, Take 1 tablet by mouth 3 (Three) Times a Day As Needed for Muscle Spasms., Disp: 21 tablet, Rfl: 0  •  estradiol (ESTRACE) 1 MG tablet, Take 1 mg by mouth Daily., Disp: , Rfl:   •  ferrous sulfate 325 (65 FE) MG tablet, Take 1 tablet by mouth Daily With " Breakfast for 30 days., Disp: 30 tablet, Rfl: 0  •  folic acid (FOLVITE) 1 MG tablet, Take 1 tablet by mouth Daily for 30 days., Disp: 30 tablet, Rfl: 0  •  insulin detemir (LEVEMIR) 100 UNIT/ML injection, Inject 12 Units under the skin into the appropriate area as directed 2 (Two) Times a Day., Disp: 15 mL, Rfl: 12  •  Insulin Lispro, 1 Unit Dial, (HUMALOG) 100 UNIT/ML solution pen-injector, Inject 8 Units under the skin into the appropriate area as directed 3 (Three) Times a Day With Meals., Disp: 15 mL, Rfl: 12  •  Insulin Pen Needle (B-D UF III MINI PEN NEEDLES) 31G X 5 MM misc, Use as directed four times daily., Disp: 200 each, Rfl: 3  •  multivitamin (THERAGRAN) tablet tablet, Take 1 tablet by mouth Daily for 30 days., Disp: 30 tablet, Rfl: 0  •  omeprazole (priLOSEC) 40 MG capsule, Take 1 capsule by mouth Daily for 60 days., Disp: 60 capsule, Rfl: 0  •  ondansetron (Zofran) 4 MG tablet, Take 1 tablet by mouth Every 4 (Four) Hours As Needed for Nausea., Disp: 20 tablet, Rfl: 0  •  thiamine (VITAMIN B-1) 100 MG tablet  tablet, Take 1 tablet by mouth Daily for 30 days., Disp: 30 tablet, Rfl: 0  •  vitamin D3 125 MCG (5000 UT) capsule capsule, Take 1 capsule by mouth Daily for 30 days., Disp: 30 capsule, Rfl: 0  No current facility-administered medications for this visit.    Drug Interactions  None relevant to diabetes care       Recommended Medications Assessment  • Aspirin - Not Indicated  • Statin - Not currently taking  • ACEi/ARB - Not currently taking    Current 10-Year ASCVD Risk: need updated labs     Relevant Laboratory Values  A1C Last 3 Results    HGBA1C Last 3 Results 6/29/21 4/26/22 5/9/22   Hemoglobin A1C 10.0 (A) 11.60 (A) 10.80 (A)   (A) Abnormal value       Comments are available for some flowsheets but are not being displayed.           Lab Results   Component Value Date    HGBA1C 10.80 (H) 05/09/2022     Lab Results   Component Value Date    GLUCOSE 109 (H) 05/12/2022    CALCIUM 9.3 05/12/2022      05/12/2022    K 3.8 05/12/2022    CO2 25.0 05/12/2022     05/12/2022    BUN 4 (L) 05/12/2022    CREATININE 0.47 (L) 05/12/2022    EGFRIFAFRI >60 07/30/2021    EGFRIFNONA 104 02/21/2022    BCR 8.5 05/12/2022    ANIONGAP 13.0 05/12/2022     Lab Results   Component Value Date    CHLPL 161 06/29/2021    TRIG 230 (H) 06/29/2021    HDL 56 06/29/2021    LDL 68 06/29/2021         Education Provided for DM medications:  None    Adherence and Self-Administration  • Barriers to Patient Adherence and/or Self-Administration: None  • Methods for Supporting Patient Adherence and/or Self-Administration: None Required      Vaccination Status:   COVID 19: UTD   Influenza: not vaccinated  Pneumococcal: not vaccinated  Hep B: UTD      Goals of Therapy  • Patient Goals of Therapy: Take medication everyday   • Clinical Goals or Therapeutic Targets, If Applicable: A1C reduction       Reassessment Plan & Follow-Up  1. Patient's diabetes is not at goal with most recent A1C of 10.8%. She needs adjustments on her insulin today.   2. Recommended appropriate vaccinations - patient refused   3. Updated labs would be beneficial to monitor lipids.   4. She reports no issues with affordability or adherence at this time.        Patient does not wish to use [facility] at this time due to: loyalty to [facility]     Margoth Riddle PharmD  6/2/2022  15:15 EDT

## 2022-06-06 ENCOUNTER — OFFICE VISIT (OUTPATIENT)
Dept: BARIATRICS/WEIGHT MGMT | Facility: CLINIC | Age: 37
End: 2022-06-06

## 2022-06-06 VITALS
TEMPERATURE: 97.7 F | SYSTOLIC BLOOD PRESSURE: 128 MMHG | OXYGEN SATURATION: 98 % | HEART RATE: 88 BPM | WEIGHT: 178 LBS | HEIGHT: 61 IN | BODY MASS INDEX: 33.61 KG/M2 | DIASTOLIC BLOOD PRESSURE: 82 MMHG | RESPIRATION RATE: 16 BRPM

## 2022-06-06 DIAGNOSIS — K91.2 POSTGASTRECTOMY MALABSORPTION: ICD-10-CM

## 2022-06-06 DIAGNOSIS — Z13.0 SCREENING FOR IRON DEFICIENCY ANEMIA: ICD-10-CM

## 2022-06-06 DIAGNOSIS — R53.83 FATIGUE, UNSPECIFIED TYPE: ICD-10-CM

## 2022-06-06 DIAGNOSIS — Z13.21 MALNUTRITION SCREEN: ICD-10-CM

## 2022-06-06 DIAGNOSIS — Z90.3 POSTGASTRECTOMY MALABSORPTION: ICD-10-CM

## 2022-06-06 DIAGNOSIS — E55.9 HYPOVITAMINOSIS D: ICD-10-CM

## 2022-06-06 DIAGNOSIS — E66.9 OBESITY, CLASS I, BMI 30-34.9: Primary | ICD-10-CM

## 2022-06-06 PROCEDURE — 99024 POSTOP FOLLOW-UP VISIT: CPT | Performed by: PHYSICIAN ASSISTANT

## 2022-06-06 RX ORDER — GLUCAGON INJECTION, SOLUTION 1 MG/.2ML
INJECTION, SOLUTION SUBCUTANEOUS
COMMUNITY
Start: 2022-05-31

## 2022-06-06 RX ORDER — PROCHLORPERAZINE 25 MG/1
SUPPOSITORY RECTAL
COMMUNITY
Start: 2022-05-16

## 2022-06-06 RX ORDER — DIPHENOXYLATE HYDROCHLORIDE AND ATROPINE SULFATE 2.5; .025 MG/1; MG/1
1 TABLET ORAL DAILY
Qty: 30 TABLET | Refills: 2 | Status: SHIPPED | OUTPATIENT
Start: 2022-06-06 | End: 2022-07-04 | Stop reason: SDUPTHER

## 2022-06-06 RX ORDER — FOLIC ACID 1 MG/1
1 TABLET ORAL DAILY
Qty: 30 TABLET | Refills: 2 | Status: SHIPPED | OUTPATIENT
Start: 2022-06-06 | End: 2022-07-04 | Stop reason: SDUPTHER

## 2022-06-06 RX ORDER — FERROUS SULFATE 325(65) MG
325 TABLET ORAL
Qty: 30 TABLET | Refills: 2 | Status: SHIPPED | OUTPATIENT
Start: 2022-06-06 | End: 2022-07-04 | Stop reason: SDUPTHER

## 2022-06-06 NOTE — PROGRESS NOTES
[facility]  [address]  [address]  [facility] [address] [phone]      Patient Name:  Rain Barreto  :  1985      Date of Visit: 2022      Reason for Visit:  1 month postop    HPI:  Rain Barreto is a 36 y.o. female s/p LSG/ella 22 GDW    LOV 22: presented on POD#11 with complaints of n/v, abdominal pain, dizziness, and poor PO intake. She was sent directly to ED and was found to be in DKA and admitted to ICU.     Doing much better since her last office visit.  Blood sugars are much better controlled.  She still notes some intermittent nausea, but this is improving.  No vomiting or dysphagia.  She does notice some occasional sharp pain around 2 of her incision sites, but overall is not very bothered by this.  She is also having some fatigue, but this has improved and she has returned to work..  No other issues/concerns. Denies dysphagia, reflux, vomiting, pulmonary issues, fevers, memory loss, vision changes and numbness/tingling.  Tolerating diet progression - on stage 5.  Getting 100g prot/day.  Drinking 64 fluid oz/day.  Taking MVI, B12, B1, Calcium, Vit D, iron and Vit C.  On Omeprazole .  Still holding ASA , NSAIDs , Tramadol, Hormones, Diuretics , Steroids and Immunologics.  Physical activity: walking daily.     Presurgery weight:  192 pounds. Today's weight is 80.7 kg (178 lb) pounds, today's Body mass index is 33.65 kg/m²., and weight loss since surgery is 14 pounds.       Past Medical History:   Diagnosis Date   • Anxiety    • Depression    • Diabetes mellitus (HCC)     Type I, has detachable pump. dx in    • Diverticulosis    • Dyspepsia    • Gallstones    • Heartburn     takes prn zantac and tums, EGD  chavo shirley, EGD Dr. Finnegan 21   • HLD (hyperlipidemia)    • Hypertension     denies   • Kidney infection    • Kidney stone    • Migraine    • MRSA carrier     PAT (+) 22   •  (normal spontaneous  "vaginal delivery)     1st child w/ preeclampsia   • PONV (postoperative nausea and vomiting)    • Tattoos    • Wears dentures     upper   • Wheezing     \"seasonal allergies\" uses inhalers prn     Past Surgical History:   Procedure Laterality Date   •  SECTION     •  SECTION     •  SECTION     • CHOLECYSTECTOMY N/A 2022    Procedure: CHOLECYSTECTOMY LAPAROSCOPIC;  Surgeon: Yunior Finnegan MD;  Location:  [facility];  Service: General;  Laterality: N/A;   • COLONOSCOPY N/A 04/15/2019    Procedure: COLONOSCOPY W/ COLD FORCEP BIOPSIES; COLD SNARE POLYPECTOMY; COLD FORCEP POLYPECTOMY; RESOLUTION CLIP X1;  Surgeon: Adán Pena MD;  Location: [facility] ENDOSCOPY;  Service: Gastroenterology   • D & C HYSTEROSCOPY ENDOMETRIAL ABLATION     • ENDOSCOPY N/A 2019    Procedure: ESOPHAGOGASTRODUODENOSCOPY with cold biopsies and dilation;  Surgeon: Adán Pena MD;  Location: [facility] ENDOSCOPY;  Service: Gastroenterology   • ENDOSCOPY N/A 2022    Procedure: ESOPHAGOGASTRODUODENOSCOPY;  Surgeon: Yunior Finnegan MD;  Location:  [facility];  Service: Bariatric;  Laterality: N/A;   • GASTRIC SLEEVE LAPAROSCOPIC N/A 2022    Procedure: GASTRIC SLEEVE LAPAROSCOPIC;  Surgeon: Yunior Finnegan MD;  Location:  [facility];  Service: Bariatric;  Laterality: N/A;   • TEETH EXTRACTION      dentures on upper plate   • TOTAL ABDOMINAL HYSTERECTOMY      low transverse.  Owasa Harry - lap converted to open for scar tissue   • TUBAL ABDOMINAL LIGATION       Outpatient Medications Marked as Taking for the 22 encounter (Office Visit) with Bridget Andino PA   Medication Sig Dispense Refill   • ascorbic acid (VITAMIN C) 1000 MG tablet Take 1 tablet by mouth Every Morning for 30 days. 30 tablet 0   • Continuous Blood Gluc Transmit (Dexcom G6 Transmitter) misc USE TO CHECK GLUCOSE AND CHANGE EVERY 3 MONTHS     • cyanocobalamin (VITAMIN B-12) 1000 MCG tablet Take 1 " tablet by mouth Daily for 30 days. 30 tablet 0   • cyclobenzaprine (FLEXERIL) 10 MG tablet Take 1 tablet by mouth 3 (Three) Times a Day As Needed for Muscle Spasms. 21 tablet 0   • estradiol (ESTRACE) 1 MG tablet Take 1 mg by mouth Daily.     • ferrous sulfate 325 (65 FE) MG tablet Take 1 tablet by mouth Daily With Breakfast for 30 days. 30 tablet 0   • folic acid (FOLVITE) 1 MG tablet Take 1 tablet by mouth Daily for 30 days. 30 tablet 0   • Gvoke HypoPen 2-Pack 1 MG/0.2ML solution auto-injector      • insulin detemir (LEVEMIR) 100 UNIT/ML injection Inject 12 Units under the skin into the appropriate area as directed 2 (Two) Times a Day. 15 mL 12   • Insulin Lispro, 1 Unit Dial, (HUMALOG) 100 UNIT/ML solution pen-injector Inject 8 Units under the skin into the appropriate area as directed 3 (Three) Times a Day With Meals. 15 mL 12   • Insulin Pen Needle (B-D UF III MINI PEN NEEDLES) 31G X 5 MM misc Use as directed four times daily. 200 each 3   • multivitamin (THERAGRAN) tablet tablet Take 1 tablet by mouth Daily for 30 days. 30 tablet 0   • omeprazole (priLOSEC) 40 MG capsule Take 1 capsule by mouth Daily for 60 days. 60 capsule 0   • ondansetron (Zofran) 4 MG tablet Take 1 tablet by mouth Every 4 (Four) Hours As Needed for Nausea. 20 tablet 0   • thiamine (VITAMIN B-1) 100 MG tablet  tablet Take 1 tablet by mouth Daily for 30 days. 30 tablet 0   • vitamin D3 125 MCG (5000 UT) capsule capsule Take 1 capsule by mouth Daily for 30 days. 30 capsule 0     Allergies   Allergen Reactions   • Aspirin Unknown - High Severity     Please avoid all IV and oral NSAIDs until after June 9, 2022 for high risk of life-threatening delayed gastric leak.  Please contact Bariatric Surgery service if there are questions.   • Morphine Itching   • Prednisone Other (See Comments)     Please avoid all IV, intraarticular, intramuscular, and oral steroids until after June 23, 2022 for high risk of life-threatening delayed gastric leak.   "Please contact Bariatric Surgery service if there are questions.   • Toradol [Ketorolac Tromethamine] Other (See Comments)     Please avoid all IV and oral NSAIDs until after 2022 for high risk of life-threatening delayed gastric leak.  Please contact Bariatric Surgery service if there are questions.   • Adhesive Tape Dermatitis     Luisa. Tele electrodes   • Hydrocodone Itching and Rash   • Jardiance [Empagliflozin] Itching and Rash   • Latex Rash   • Trulicity [Dulaglutide] Nausea Only       Social History     Socioeconomic History   • Marital status:    Tobacco Use   • Smoking status: Former Smoker     Packs/day: 0.25     Years: 9.00     Pack years: 2.25     Types: Cigarettes     Quit date:      Years since quittin.4   • Smokeless tobacco: Never Used   Vaping Use   • Vaping Use: Never used   Substance and Sexual Activity   • Alcohol use: Not Currently     Comment: social   • Drug use: No   • Sexual activity: Defer     Social History     Social History Narrative    Lives in [place] [facility] [place], close to [place].   with four children.  Works as a groomer.         /82 (BP Location: Left arm, Patient Position: Sitting, Cuff Size: Adult)   Pulse 88   Temp 97.7 °F (36.5 °C) (Infrared)   Resp 16   Ht 154.9 cm (60.98\")   Wt 80.7 kg (178 lb)   LMP  (LMP Unknown)   SpO2 98%   BMI 33.65 kg/m²     Physical Exam  Constitutional:       Appearance: She is obese.   HENT:      Head: Normocephalic and atraumatic.   Cardiovascular:      Rate and Rhythm: Normal rate and regular rhythm.   Pulmonary:      Effort: Pulmonary effort is normal.      Breath sounds: Normal breath sounds.   Abdominal:      General: Bowel sounds are normal. There is no distension.      Palpations: Abdomen is soft. There is no mass.      Tenderness: There is no abdominal tenderness.      Hernia: No hernia is present.      Comments: Lap incisions well-healed.   Skin:     General: Skin is warm and dry.   Neurological:      " General: No focal deficit present.      Mental Status: She is alert and oriented to person, place, and time.   Psychiatric:         Mood and Affect: Mood normal.         Behavior: Behavior normal.           Assessment:  1 month s/p LSG/ella 4/28/22 GDW    ICD-10-CM ICD-9-CM   1. Obesity, Class I, BMI 30-34.9  E66.9 278.00   2. Fatigue, unspecified type  R53.83 780.79   3. Malnutrition screen  Z13.21 V77.2   4. Postgastrectomy malabsorption  K91.2 579.3    Z90.3    5. Screening for iron deficiency anemia  Z13.0 V78.0   6. Hypovitaminosis D  E55.9 268.9       BMI is >= 30 and <= 34.9 (Class 1 obesity). The following options were offered after discussion: exercise counseling/recommendations and nutrition counseling/recommendations      Plan:  Doing much better.  She does have some intermittent nausea, but does seem to be improving.  She will let us know if this does not improve and can consider ordering an upper GI at that time.  Continue to advance diet per manual.  Continue protein >70g/day.  Encouraged good food choices - high protein, low carb.  Continue routine physical activity.  Routine bariatric labs ordered.  Continue vitamins w/ adjustments pending lab results.  Continue to avoid ASA/NSAIDs/tobacco x 6 weeks postop, steroids x 8 weeks postop.   Call w/ problems/concerns.    Referral to exercise physiology placed.     The patient was instructed to follow up in 2 months, sooner if needed.

## 2022-06-12 LAB
25(OH)D3+25(OH)D2 SERPL-MCNC: 51.5 NG/ML (ref 30–100)
ALBUMIN SERPL-MCNC: 4.3 G/DL (ref 3.8–4.8)
ALBUMIN/GLOB SERPL: 1.9 {RATIO} (ref 1.2–2.2)
ALP SERPL-CCNC: 91 IU/L (ref 44–121)
ALT SERPL-CCNC: 14 IU/L (ref 0–32)
AST SERPL-CCNC: 16 IU/L (ref 0–40)
BASOPHILS # BLD AUTO: 0 X10E3/UL (ref 0–0.2)
BASOPHILS NFR BLD AUTO: 0 %
BILIRUB SERPL-MCNC: 0.5 MG/DL (ref 0–1.2)
BUN SERPL-MCNC: 9 MG/DL (ref 6–20)
BUN/CREAT SERPL: 18 (ref 9–23)
CALCIUM SERPL-MCNC: 9.6 MG/DL (ref 8.7–10.2)
CHLORIDE SERPL-SCNC: 104 MMOL/L (ref 96–106)
CO2 SERPL-SCNC: 24 MMOL/L (ref 20–29)
CREAT SERPL-MCNC: 0.51 MG/DL (ref 0.57–1)
EGFRCR SERPLBLD CKD-EPI 2021: 122 ML/MIN/1.73
EOSINOPHIL # BLD AUTO: 0 X10E3/UL (ref 0–0.4)
EOSINOPHIL NFR BLD AUTO: 1 %
ERYTHROCYTE [DISTWIDTH] IN BLOOD BY AUTOMATED COUNT: 13.6 % (ref 11.7–15.4)
FERRITIN SERPL-MCNC: 143 NG/ML (ref 15–150)
FOLATE SERPL-MCNC: >20 NG/ML
GLOBULIN SER CALC-MCNC: 2.3 G/DL (ref 1.5–4.5)
GLUCOSE SERPL-MCNC: 138 MG/DL (ref 65–99)
HCT VFR BLD AUTO: 38.9 % (ref 34–46.6)
HGB BLD-MCNC: 12.8 G/DL (ref 11.1–15.9)
IMM GRANULOCYTES # BLD AUTO: 0 X10E3/UL (ref 0–0.1)
IMM GRANULOCYTES NFR BLD AUTO: 0 %
IRON SERPL-MCNC: 89 UG/DL (ref 27–159)
LYMPHOCYTES # BLD AUTO: 1.3 X10E3/UL (ref 0.7–3.1)
LYMPHOCYTES NFR BLD AUTO: 29 %
MCH RBC QN AUTO: 29.4 PG (ref 26.6–33)
MCHC RBC AUTO-ENTMCNC: 32.9 G/DL (ref 31.5–35.7)
MCV RBC AUTO: 89 FL (ref 79–97)
METHYLMALONATE SERPL-SCNC: 99 NMOL/L (ref 0–378)
MONOCYTES # BLD AUTO: 0.3 X10E3/UL (ref 0.1–0.9)
MONOCYTES NFR BLD AUTO: 7 %
NEUTROPHILS # BLD AUTO: 2.8 X10E3/UL (ref 1.4–7)
NEUTROPHILS NFR BLD AUTO: 63 %
PLATELET # BLD AUTO: 223 X10E3/UL (ref 150–450)
POTASSIUM SERPL-SCNC: 4.4 MMOL/L (ref 3.5–5.2)
PREALB SERPL-MCNC: 21 MG/DL (ref 14–35)
PROT SERPL-MCNC: 6.6 G/DL (ref 6–8.5)
RBC # BLD AUTO: 4.35 X10E6/UL (ref 3.77–5.28)
SODIUM SERPL-SCNC: 143 MMOL/L (ref 134–144)
VIT B1 BLD-SCNC: 178.4 NMOL/L (ref 66.5–200)
WBC # BLD AUTO: 4.5 X10E3/UL (ref 3.4–10.8)

## 2022-06-13 ENCOUNTER — TELEPHONE (OUTPATIENT)
Dept: ENDOCRINOLOGY | Facility: CLINIC | Age: 37
End: 2022-06-13

## 2022-06-13 RX ORDER — INSULIN LISPRO 100 [IU]/ML
8 INJECTION, SOLUTION INTRAVENOUS; SUBCUTANEOUS
Qty: 15 ML | Refills: 12 | Status: SHIPPED | OUTPATIENT
Start: 2022-06-13 | End: 2022-07-04 | Stop reason: SDUPTHER

## 2022-06-13 RX ORDER — FLURBIPROFEN SODIUM 0.3 MG/ML
SOLUTION/ DROPS OPHTHALMIC 4 TIMES DAILY
Qty: 200 EACH | Refills: 3 | Status: SHIPPED | OUTPATIENT
Start: 2022-06-13 | End: 2022-07-04 | Stop reason: SDUPTHER

## 2022-06-13 NOTE — TELEPHONE ENCOUNTER
Pt called and needs her Humalog and Levemir sent in to [facility] in [place]. She also sts that no one from [facility] has gotten ahold of her yet.

## 2022-06-27 ENCOUNTER — OFFICE VISIT (OUTPATIENT)
Dept: OTHER | Facility: HOSPITAL | Age: 37
End: 2022-06-27

## 2022-06-27 NOTE — PROGRESS NOTES
Exercise Education Note - Initial Visit    Patient: Rain Barreto       Date:06/27/2022    Telehealth Exercise consult, 60 minutes counseling and program design/preparations. This medical referred consult was provided as a ZOOM call,  Consent for treatment was given verbally.    For/Concerns: exercise rx post bariatric surgery    Current Health Status: fair, unmanaged type 1 , working with endo recommended and scheduled dm education    Current Exercise Abilities/Experience: beginner    Equipment / Facilities Available: [employer]    Planning for Consistency / Achievement Strategies: Patient is a  and spends a lot of her work day on her feet and active.  She is easily meeting ADL recommendations. We discussed the difference between ADL and Cardio, and ways to incorporate more cardio. We also reviewed strength training and it was suggested to include this 2 times per week. Suggested exercises were emailed. She set goals to go to the gym 3-4 days per week for combination of strength and cardio exercise.  Patient is experiencing significant fatigue and high BG, she was advise to eat before and after exercise . She was also scheduled for DM education. It is believed that  fatigue could be improved with increase nutrition specifically carbohydrates and hydration. Patient set goal of increasing water to 32 oz per day, other fluids are coming from protein shakes.      Follow up is schedule for 6 weeks but advised to reach out if she had questions sooner.       Fran Moore  6/27/2022  11:21 EDT

## 2022-06-28 ENCOUNTER — APPOINTMENT (OUTPATIENT)
Dept: OTHER | Facility: HOSPITAL | Age: 37
End: 2022-06-28

## 2022-07-05 RX ORDER — INSULIN LISPRO 100 [IU]/ML
8 INJECTION, SOLUTION INTRAVENOUS; SUBCUTANEOUS
Qty: 15 ML | Refills: 12 | Status: SHIPPED | OUTPATIENT
Start: 2022-07-05 | End: 2022-07-11 | Stop reason: SDUPTHER

## 2022-07-05 RX ORDER — FLURBIPROFEN SODIUM 0.3 MG/ML
SOLUTION/ DROPS OPHTHALMIC 4 TIMES DAILY
Qty: 200 EACH | Refills: 3 | Status: SHIPPED | OUTPATIENT
Start: 2022-07-05

## 2022-07-06 RX ORDER — FOLIC ACID 1 MG/1
1 TABLET ORAL DAILY
Qty: 30 TABLET | Refills: 2 | Status: SHIPPED | OUTPATIENT
Start: 2022-07-06 | End: 2022-08-05

## 2022-07-06 RX ORDER — FERROUS SULFATE 325(65) MG
325 TABLET ORAL
Qty: 30 TABLET | Refills: 2 | Status: SHIPPED | OUTPATIENT
Start: 2022-07-06 | End: 2022-08-05

## 2022-07-06 RX ORDER — DIPHENOXYLATE HYDROCHLORIDE AND ATROPINE SULFATE 2.5; .025 MG/1; MG/1
1 TABLET ORAL DAILY
Qty: 30 TABLET | Refills: 2 | Status: SHIPPED | OUTPATIENT
Start: 2022-07-06 | End: 2022-08-05

## 2022-07-11 ENCOUNTER — OFFICE VISIT (OUTPATIENT)
Dept: ENDOCRINOLOGY | Facility: CLINIC | Age: 37
End: 2022-07-11

## 2022-07-11 ENCOUNTER — SPECIALTY PHARMACY (OUTPATIENT)
Dept: PHARMACY | Facility: HOSPITAL | Age: 37
End: 2022-07-11

## 2022-07-11 VITALS
HEIGHT: 61 IN | HEART RATE: 98 BPM | WEIGHT: 174.8 LBS | DIASTOLIC BLOOD PRESSURE: 70 MMHG | BODY MASS INDEX: 33 KG/M2 | SYSTOLIC BLOOD PRESSURE: 115 MMHG | OXYGEN SATURATION: 99 %

## 2022-07-11 DIAGNOSIS — E10.65 TYPE 1 DIABETES MELLITUS WITH HYPERGLYCEMIA: Primary | ICD-10-CM

## 2022-07-11 LAB — GLUCOSE BLDC GLUCOMTR-MCNC: 265 MG/DL (ref 70–130)

## 2022-07-11 PROCEDURE — 99213 OFFICE O/P EST LOW 20 MIN: CPT | Performed by: NURSE PRACTITIONER

## 2022-07-11 PROCEDURE — 95251 CONT GLUC MNTR ANALYSIS I&R: CPT | Performed by: NURSE PRACTITIONER

## 2022-07-11 RX ORDER — INSULIN PMP CART,AUT,G6/7,CNTR
1 EACH SUBCUTANEOUS CONTINUOUS
Qty: 1 KIT | Refills: 0 | Status: SHIPPED | OUTPATIENT
Start: 2022-07-11 | End: 2022-07-11

## 2022-07-11 RX ORDER — INSULIN PMP CART,AUT,G6/7,CNTR
1 EACH SUBCUTANEOUS CONTINUOUS
Qty: 1 KIT | Refills: 0 | Status: SHIPPED | OUTPATIENT
Start: 2022-07-11 | End: 2022-09-15

## 2022-07-11 RX ORDER — OMEPRAZOLE 40 MG/1
40 CAPSULE, DELAYED RELEASE ORAL DAILY
COMMUNITY
End: 2022-11-14

## 2022-07-11 RX ORDER — INSULIN LISPRO 100 [IU]/ML
10 INJECTION, SOLUTION INTRAVENOUS; SUBCUTANEOUS
Qty: 15 ML | Refills: 2 | Status: SHIPPED | OUTPATIENT
Start: 2022-07-11 | End: 2022-08-09 | Stop reason: SDUPTHER

## 2022-07-11 NOTE — PROGRESS NOTES
Rain Barreto is a 36 y.o. female had no chief complaint listed for this encounter.      She has not started back on her Omnipod pump. She is interested in getting started on an Omnipod 5 pump.    On  she went in for gastric sleeve and cholecystectomy.  She forgot to suspend her omnipod pump and had a severe hypoglycemia during her surgical procedure and postop.  She has had some significant complications since surgery.  She was found to be in DKA at that time and was hospitalized.  She since has been on injections since her surgery.  She would like to go back on her Omnipod pump.      Diabetes was diagnosed .  Complications include neuropathy.  Last ophtho exam was 2022, [place] [email or web address].  Current medications for diabetes include Levemir 14 units BID, Humalog 10 units TID with meals-she has been taking this after eating her meals.  She checks her blood sugar dexcom times per day.   Hypos: almost nightly, she has increased highs throughout the day    The following portions of the patient's history were reviewed and updated as appropriate: allergies, current medications, past family history, past medical history, past social history, past surgical history and problem list.    Diet: She is very limited due to her recent gastric bypass surgery.    Prior Omnipod settings:   Basal:  12A: 2.25  7A: 2.05  5P: 2.25  CF: 1:11  CR: 1:3    Past Medical History:   Diagnosis Date   • Anxiety    • Depression    • Diabetes mellitus (HCC)     Type I, has detachable pump. dx in 's   • Diverticulosis    • Dyspepsia    • Gallstones    • Heartburn     takes prn zantac and tums, EGD  [facility] [place], EGD Dr. Finnegan 21   • HLD (hyperlipidemia)    • Hypertension     denies   • Kidney infection    • Kidney stone    • Migraine    • MRSA carrier     PAT (+) 22   •  (normal spontaneous vaginal delivery)     1st child w/ preeclampsia   • PONV (postoperative nausea and vomiting)    • Tattoos   "  • Wears dentures     upper   • Wheezing     \"seasonal allergies\" uses inhalers prn     Past Surgical History:   Procedure Laterality Date   •  SECTION     •  SECTION     •  SECTION     • CHOLECYSTECTOMY N/A 2022    Procedure: CHOLECYSTECTOMY LAPAROSCOPIC;  Surgeon: Yunior Finnegan MD;  Location:  [facility];  Service: General;  Laterality: N/A;   • COLONOSCOPY N/A 04/15/2019    Procedure: COLONOSCOPY W/ COLD FORCEP BIOPSIES; COLD SNARE POLYPECTOMY; COLD FORCEP POLYPECTOMY; RESOLUTION CLIP X1;  Surgeon: Adán Pena MD;  Location: [facility] ENDOSCOPY;  Service: Gastroenterology   • D & C HYSTEROSCOPY ENDOMETRIAL ABLATION     • ENDOSCOPY N/A 2019    Procedure: ESOPHAGOGASTRODUODENOSCOPY with cold biopsies and dilation;  Surgeon: Adán Pena MD;  Location: [facility] ENDOSCOPY;  Service: Gastroenterology   • ENDOSCOPY N/A 2022    Procedure: ESOPHAGOGASTRODUODENOSCOPY;  Surgeon: Yunior Finnegan MD;  Location:  [facility];  Service: Bariatric;  Laterality: N/A;   • GASTRIC SLEEVE LAPAROSCOPIC N/A 2022    Procedure: GASTRIC SLEEVE LAPAROSCOPIC;  Surgeon: Yunior Finnegan MD;  Location:  [facility];  Service: Bariatric;  Laterality: N/A;   • TEETH EXTRACTION      dentures on upper plate   • TOTAL ABDOMINAL HYSTERECTOMY      low transverse.  [facility] - lap converted to open for scar tissue   • TUBAL ABDOMINAL LIGATION        Family History   Problem Relation Age of Onset   • Colon cancer Paternal Uncle    • Colon cancer Maternal Grandmother    • Crohn's disease Maternal Grandmother    • Diabetes Maternal Grandmother    • Colon cancer Paternal Grandmother    • Other Paternal Grandmother         Colitis   • Diabetes Paternal Grandmother    • Hyperlipidemia Paternal Grandmother    • Hypertension Paternal Grandmother    • Sleep apnea Paternal Grandmother    • Colon cancer Paternal Grandfather    • Diabetes Paternal Grandfather    • Hypertension " Paternal Grandfather    • Hypertension Mother    • Cancer Mother    • Diabetes Maternal Grandfather    • Hypertension Maternal Grandfather    • Stroke Maternal Grandfather    • No Known Problems Brother    • No Known Problems Son    • No Known Problems Son    • No Known Problems Daughter    • No Known Problems Daughter       Social History     Socioeconomic History   • Marital status:    Tobacco Use   • Smoking status: Former Smoker     Packs/day: 0.25     Years: 9.00     Pack years: 2.25     Types: Cigarettes     Quit date:      Years since quittin.5   • Smokeless tobacco: Never Used   Vaping Use   • Vaping Use: Never used   Substance and Sexual Activity   • Alcohol use: Not Currently     Comment: social   • Drug use: No   • Sexual activity: Defer      Allergies   Allergen Reactions   • Aspirin Unknown - High Severity     Please avoid all IV and oral NSAIDs until after 2022 for high risk of life-threatening delayed gastric leak.  Please contact Bariatric Surgery service if there are questions.   • Morphine Itching   • Prednisone Other (See Comments)     Please avoid all IV, intraarticular, intramuscular, and oral steroids until after 2022 for high risk of life-threatening delayed gastric leak.  Please contact Bariatric Surgery service if there are questions.   • Toradol [Ketorolac Tromethamine] Other (See Comments)     Please avoid all IV and oral NSAIDs until after 2022 for high risk of life-threatening delayed gastric leak.  Please contact Bariatric Surgery service if there are questions.   • Adhesive Tape Dermatitis     Luisa. Tele electrodes   • Hydrocodone Itching and Rash   • Jardiance [Empagliflozin] Itching and Rash   • Latex Rash   • Trulicity [Dulaglutide] Nausea Only      Current Outpatient Medications on File Prior to Visit   Medication Sig Dispense Refill   • ascorbic acid (VITAMIN C) 1000 MG tablet Take 1 tablet by mouth Every Morning for 30 days. 30 tablet 2   •  Continuous Blood Gluc Transmit (Dexcom G6 Transmitter) misc USE TO CHECK GLUCOSE AND CHANGE EVERY 3 MONTHS     • cyanocobalamin (VITAMIN B-12) 1000 MCG tablet Take 1 tablet by mouth Daily for 30 days. 30 tablet 2   • cyclobenzaprine (FLEXERIL) 10 MG tablet Take 1 tablet by mouth 3 (Three) Times a Day As Needed for Muscle Spasms. 21 tablet 0   • estradiol (ESTRACE) 1 MG tablet Take 1 mg by mouth Daily.     • ferrous sulfate 325 (65 FE) MG tablet Take 1 tablet by mouth Daily With Breakfast for 30 days. 30 tablet 2   • folic acid (FOLVITE) 1 MG tablet Take 1 tablet by mouth Daily for 30 days. 30 tablet 2   • Gvoke HypoPen 2-Pack 1 MG/0.2ML solution auto-injector      • Insulin Pen Needle (B-D UF III MINI PEN NEEDLES) 31G X 5 MM misc Use as directed four times daily. 200 each 3   • multivitamin (THERAGRAN) tablet tablet Take 1 tablet by mouth Daily for 30 days. 30 tablet 2   • ondansetron (Zofran) 4 MG tablet Take 1 tablet by mouth Every 4 (Four) Hours As Needed for Nausea. (Patient taking differently: Take 4 mg by mouth 2 (Two) Times a Day.) 20 tablet 0   • thiamine (VITAMIN B-1) 100 MG tablet  tablet Take 1 tablet by mouth Daily for 30 days. 30 tablet 2   • [DISCONTINUED] insulin detemir (LEVEMIR) 100 UNIT/ML injection Inject 12 Units under the skin into the appropriate area as directed 2 (Two) Times a Day. (Patient taking differently: Inject 14 Units under the skin into the appropriate area as directed 2 (Two) Times a Day.) 15 mL 12   • [DISCONTINUED] Insulin Lispro, 1 Unit Dial, (HUMALOG) 100 UNIT/ML solution pen-injector Inject 8 Units under the skin into the appropriate area as directed 3 (Three) Times a Day With Meals. (Patient taking differently: Inject 10 Units under the skin into the appropriate area as directed 3 (Three) Times a Day With Meals.) 15 mL 12   • [DISCONTINUED] omeprazole (priLOSEC) 40 MG capsule Take 1 capsule by mouth Daily for 60 days. 60 capsule 0     No current facility-administered  "medications on file prior to visit.        Review of Systems   Constitutional: Positive for fatigue. Negative for unexpected weight gain and unexpected weight loss.   Eyes: Negative.    Endocrine: Negative for polydipsia, polyphagia and polyuria.   Psychiatric/Behavioral: Negative for sleep disturbance.   All other systems reviewed and are negative.    /70 (BP Location: Left arm, Patient Position: Sitting, Cuff Size: Adult)   Pulse 98   Ht 154.9 cm (61\") Comment: per pt  Wt 79.3 kg (174 lb 12.8 oz)   LMP  (LMP Unknown)   SpO2 99%   BMI 33.03 kg/m²      Physical Exam  Vitals reviewed.   Constitutional:       Appearance: Normal appearance.   Eyes:      Extraocular Movements: Extraocular movements intact.   Cardiovascular:      Rate and Rhythm: Normal rate.      Pulses: Normal pulses.   Pulmonary:      Effort: Pulmonary effort is normal.   Skin:     General: Skin is warm and dry.   Neurological:      Mental Status: She is alert and oriented to person, place, and time.   Psychiatric:         Mood and Affect: Mood normal.         Behavior: Behavior normal.         Thought Content: Thought content normal.         Judgment: Judgment normal.         CMP:  Lab Results   Component Value Date     (H) 07/30/2021    BUN 9 06/06/2022    CREATININE 0.51 (L) 06/06/2022    EGFRIFNONA 104 02/21/2022    EGFRIFAFRI >60 07/30/2021    BCR 18 06/06/2022     06/06/2022    K 4.4 06/06/2022    CO2 24 06/06/2022    CALCIUM 9.6 06/06/2022    PROTENTOTREF 6.6 06/06/2022    ALBUMIN 4.3 06/06/2022    LABGLOBREF 2.3 06/06/2022    LABIL2 1.9 06/06/2022    BILITOT 0.5 06/06/2022    ALKPHOS 91 06/06/2022    AST 16 06/06/2022    ALT 14 06/06/2022     Lipid Panel:  Lab Results   Component Value Date    TRIG 230 (H) 06/29/2021    HDL 56 06/29/2021    VLDL 37 06/29/2021    LDL 68 06/29/2021     HbA1c:  Lab Results   Component Value Date    HGBA1C 10.80 (H) 05/09/2022    HGBA1C 11.60 (H) 04/26/2022     Glucose:  Lab Results "   Component Value Date    POCGLU 265 (A) 07/11/2022     Microalbumin:  No results found for: TRACI  TSH:  Lab Results   Component Value Date    TSH 1.450 06/29/2021       Assessment and Plan    Diagnoses and all orders for this visit:    1. Type 1 diabetes mellitus with hyperglycemia (HCC) (Primary)  Assessment & Plan:  -Diabetes remains unchanged.  -Discussed importance of yearly diabetic eye exams.  -Discussed importance of checking blood sugars regularly.  Using Dexcom CGM.  2-week data download shows the following:  Very high: 64%   high: 17%  In range: 18%  Low: <1%  Very low 0%  Trends shows hyperglycemia with significant postprandial hyperglycemia noted.  She has very wide variable BG's.   -Continue Levemir to 14 units twice daily.  -Continue Humalog 10 units 3 times daily with meals.  Discussed the MRI of insulin and appropriate timing.  She is to start taking this at the appropriate time.  -She would like to start with OmniPod 5 pump.  We will send to pharmacy.  -Follow-up in 1 month when A1c due.       Orders:  -     POC Glucose Fingerstick  -     Insulin Disposable Pump (Omnipod 5 G6 Intro, Gen 5,) kit; Use 1 kit Continuously as directed  Dispense: 1 kit; Refill: 0  -     insulin detemir (LEVEMIR) 100 UNIT/ML injection; Inject 14 Units under the skin into the appropriate area as directed 2 (Two) Times a Day.  Dispense: 15 mL; Refill: 2  -     Insulin Lispro, 1 Unit Dial, (HUMALOG) 100 UNIT/ML solution pen-injector; Inject 10 Units under the skin into the appropriate area as directed 3 (Three) Times a Day With Meals.  Dispense: 15 mL; Refill: 2    Other orders  -     Discontinue: Insulin Disposable Pump (Omnipod 5 G6 Intro, Gen 5,) kit; 1 kit Continuous.  Dispense: 1 kit; Refill: 0       Return in about 29 days (around 8/9/2022) for Follow-up appointment, A1C. The patient was instructed to contact the clinic with any interval questions or concerns.        This document has been electronically signed  by Hanh Garza, SATHISH  July 11, 2022 10:44 EDT   Endocrinology    Please note that portions of this document were completed with a voice recognition program. Efforts were made to edit the dictations, but occasionally words are mis-transcribed.

## 2022-07-11 NOTE — ASSESSMENT & PLAN NOTE
-Diabetes remains unchanged.  -Discussed importance of yearly diabetic eye exams.  -Discussed importance of checking blood sugars regularly.  Using Dexcom CGM.  2-week data download shows the following:  Very high: 64%   high: 17%  In range: 18%  Low: <1%  Very low 0%  Trends shows hyperglycemia with significant postprandial hyperglycemia noted.  She has very wide variable BG's.   -Continue Levemir to 14 units twice daily.  -Continue Humalog 10 units 3 times daily with meals.  Discussed the MRI of insulin and appropriate timing.  She is to start taking this at the appropriate time.  -She would like to start with OmniPod 5 pump.  We will send to pharmacy.  -Follow-up in 1 month when A1c due.

## 2022-07-11 NOTE — PROGRESS NOTES
"   Specialty Pharmacy Patient Management Program  Endocrinology Reassessment     Rain Barreto is a 36 y.o. female with Type 1 Diabetes seen by an Endocrinology provider and enrolled in the Endocrinology Patient Management program offered by [facility].  A follow-up outreach was conducted, including assessment of continued therapy appropriateness, medication adherence, and side effect incidence and management for Insulin therapy and CGM.     Patient is currently taking Levemir 14 units in the morning and 14 units at night and Humalog 10 units three times daily with meals plus additional for high BG. She has been self adjusting her insulin due to hyperglycemia. She uses Dexcom to monitor her BG with readings between 20-400s. Patient reports recurring low BG <70 in the middle of the night for which she corrects with orange juice. She is interested in an insulin pump.     Changes to Insurance Coverage or Financial Support  None    Relevant Past Medical History and Comorbidities  Relevant medical history and concomitant health conditions were discussed with the patient. The patient's chart has been reviewed for relevant past medical history and comorbid health conditions and updated as necessary.   Past Medical History:   Diagnosis Date   • Anxiety    • Depression    • Diabetes mellitus (HCC)     Type I, has detachable pump. dx in 's   • Diverticulosis    • Dyspepsia    • Gallstones    • Heartburn     takes prn zantac and tums, EGD 2019 wit dilatation, EGD Dr. Finnegan 19   • HLD (hyperlipidemia)    • Hypertension     denies   • Kidney infection    • Kidney stone    • Migraine    • MRSA carrier     PAT (+) 22   •  (normal spontaneous vaginal delivery)     1st child w/ preeclampsia   • PONV (postoperative nausea and vomiting)    • Tattoos    • Wears dentures     upper   • Wheezing     \"seasonal allergies\" uses inhalers prn     Social History     Socioeconomic History   • Marital " status:    Tobacco Use   • Smoking status: Former Smoker     Packs/day: 0.25     Years: 9.00     Pack years: 2.25     Types: Cigarettes     Quit date:      Years since quittin.5   • Smokeless tobacco: Never Used   Vaping Use   • Vaping Use: Never used   Substance and Sexual Activity   • Alcohol use: Not Currently     Comment: social   • Drug use: No   • Sexual activity: Defer          Allergies  Known allergies and reactions were discussed with the patient. The patient's chart has been reviewed for allergy information and updated as necessary.   Aspirin, Morphine, Prednisone, Toradol [ketorolac tromethamine], Adhesive tape, Hydrocodone, Jardiance [empagliflozin], Latex, and Trulicity [dulaglutide]    Allergies reviewed by Margoth Riddle [facility] on 2022 at  9:17 AM    Relevant Laboratory Values  A1C Last 3 Results    HGBA1C Last 3 Results 22   Hemoglobin A1C 11.60 (A) 10.80 (A)   (A) Abnormal value            Lab Results   Component Value Date    HGBA1C 10.80 (H) 2022     Lab Results   Component Value Date    GLUCOSE 138 (H) 2022    CALCIUM 9.6 2022     2022    K 4.4 2022    CO2 24 2022     2022    BUN 9 2022    CREATININE 0.51 (L) 2022    EGFRIFAFRI >60 2021    EGFRIFNONA 104 2022    BCR 18 2022    ANIONGAP 13.0 2022     Lab Results   Component Value Date    CHLPL 161 2021    TRIG 230 (H) 2021    HDL 56 2021    LDL 68 2021         Current Medication List  This medication list has been reviewed with the patient and evaluated for any interactions or necessary modifications/recommendations, and updated to include all prescription medications, OTC medications, and supplements the patient is currently taking.  This list reflects what is contained in the patient's profile, which has also been marked as reviewed to communicate to other providers it is the most up to date version of  the patient's current medication therapy.     Current Outpatient Medications:   •  ascorbic acid (VITAMIN C) 1000 MG tablet, Take 1 tablet by mouth Every Morning for 30 days., Disp: 30 tablet, Rfl: 2  •  Continuous Blood Gluc Transmit (Dexcom G6 Transmitter) misc, USE TO CHECK GLUCOSE AND CHANGE EVERY 3 MONTHS, Disp: , Rfl:   •  cyanocobalamin (VITAMIN B-12) 1000 MCG tablet, Take 1 tablet by mouth Daily for 30 days., Disp: 30 tablet, Rfl: 2  •  cyclobenzaprine (FLEXERIL) 10 MG tablet, Take 1 tablet by mouth 3 (Three) Times a Day As Needed for Muscle Spasms., Disp: 21 tablet, Rfl: 0  •  estradiol (ESTRACE) 1 MG tablet, Take 1 mg by mouth Daily., Disp: , Rfl:   •  ferrous sulfate 325 (65 FE) MG tablet, Take 1 tablet by mouth Daily With Breakfast for 30 days., Disp: 30 tablet, Rfl: 2  •  folic acid (FOLVITE) 1 MG tablet, Take 1 tablet by mouth Daily for 30 days., Disp: 30 tablet, Rfl: 2  •  Insulin Pen Needle (B-D UF III MINI PEN NEEDLES) 31G X 5 MM misc, Use as directed four times daily., Disp: 200 each, Rfl: 3  •  multivitamin (THERAGRAN) tablet tablet, Take 1 tablet by mouth Daily for 30 days., Disp: 30 tablet, Rfl: 2  •  omeprazole (priLOSEC) 40 MG capsule, Take 40 mg by mouth Daily., Disp: , Rfl:   •  ondansetron (Zofran) 4 MG tablet, Take 1 tablet by mouth Every 4 (Four) Hours As Needed for Nausea. (Patient taking differently: Take 4 mg by mouth 2 (Two) Times a Day.), Disp: 20 tablet, Rfl: 0  •  thiamine (VITAMIN B-1) 100 MG tablet  tablet, Take 1 tablet by mouth Daily for 30 days., Disp: 30 tablet, Rfl: 2  •  Gvoke HypoPen 2-Pack 1 MG/0.2ML solution auto-injector, , Disp: , Rfl:   •  insulin detemir (LEVEMIR) 100 UNIT/ML injection, Inject 14 Units under the skin into the appropriate area as directed 2 (Two) Times a Day., Disp: 15 mL, Rfl: 2  •  Insulin Disposable Pump (Omnipod 5 G6 Intro, Gen 5,) kit, Use 1 kit Continuously as directed, Disp: 1 kit, Rfl: 0  •  Insulin Lispro, 1 Unit Dial, (HUMALOG) 100 UNIT/ML  solution pen-injector, Inject 10 Units under the skin into the appropriate area as directed 3 (Three) Times a Day With Meals., Disp: 15 mL, Rfl: 2    Medicines reviewed by Marogth Riddle RPH on 7/11/2022 at  9:23 AM    Drug Interactions  None relevant to diabetes care    Recommended Medications Assessment  • Aspirin - Not Indicated  • Statin - Not Indicated  • ACEi/ARB - Not Indicated    Current 10-Year ASCVD Risk: cannot calculate due to age    Adverse Drug Reactions  • Adverse Reactions Experienced: Hypoglycemia  • Plan for ADR Management: Education provided - Pharmacist to consult provider    Hospitalizations and Urgent Care Since Last Assessment  • Hospitalizations or Admissions: None  • ED Visits: None   • Urgent Office Visits: None     Education Provided for DM medications:  The patient has been provided with the following education and any applicable administration techniques (i.e. self-injection) have been demonstrated for the therapies indicated. All questions and concerns have been addressed prior to the patient receiving the medication, and the patient has verbalized understanding of the education and any materials provided.  Additional patient education shall be provided and documented upon request by the patient, provider or payer.      Educated patient on the rule of 15 to treat hypoglycemia:  • Eat/drink 15 grams of carbs  • Wait 15 minutes  • Check blood glucose  • Less than 70 mg/dL? Repeat steps above    Adherence and Self-Administration  Adherence related patient's specialty therapy was discussed with the patient. The Adherence segment of this outreach has been reviewed and updated.     • Ongoing or New Barriers to Patient Adherence and/or Self-Administration: None  • Methods for Supporting Patient Adherence and/or Self-Administration: None Required    Goals of Therapy  Goals related to the patient's specialty therapy was discussed with the patient. The Patient Goals segment of this outreach has  been reviewed and updated.    Goals     • HEMOGLOBIN A1C < 7             Reassessment Plan & Follow-Up  · Medication Therapy Changes: None discussed with patient   · Additional Plans, Therapy Recommendations, or Therapy Problems to Be Addressed:    · Patient's diabetes is uncontrolled at this time. She would like to start back on an insulin pump as she was better controlled when using one in the past. She would be a good candidate for an OmniPod 5 pump as she already has Dexcom to integrate with. Her insurance covers the pump starter kit with a $0 co-pay.   · She needs adjustments on her insulin today. Encouraged patient to keep glucagon pen and glucose tablets with her at all times incase of hypoglycemic emergency.       Patient does not wish to use [facility] services at this time due to: loyalty to retail pharmacy       Attestation  I attest that the specialty medication(s) addressed above are appropriate for the patient based on my reassessment.  If the prescribed therapy is at any point deemed not appropriate based on the current or future assessments, a consultation will be initiated with the patient's specialty care provider to determine the best course of action. The revised plan of therapy will be documented along with any additional patient education provided.     Margoth Riddle RPH  7/11/2022  10:04 EDT

## 2022-08-09 ENCOUNTER — OFFICE VISIT (OUTPATIENT)
Dept: ENDOCRINOLOGY | Facility: CLINIC | Age: 37
End: 2022-08-09

## 2022-08-09 VITALS
SYSTOLIC BLOOD PRESSURE: 140 MMHG | BODY MASS INDEX: 33.69 KG/M2 | DIASTOLIC BLOOD PRESSURE: 88 MMHG | OXYGEN SATURATION: 99 % | HEART RATE: 79 BPM | HEIGHT: 60 IN | WEIGHT: 171.6 LBS

## 2022-08-09 DIAGNOSIS — E10.65 TYPE 1 DIABETES MELLITUS WITH HYPERGLYCEMIA: Primary | ICD-10-CM

## 2022-08-09 LAB
EXPIRATION DATE: ABNORMAL
GLUCOSE BLDC GLUCOMTR-MCNC: 263 MG/DL (ref 70–130)
HBA1C MFR BLD: 9 %
Lab: ABNORMAL

## 2022-08-09 PROCEDURE — 83036 HEMOGLOBIN GLYCOSYLATED A1C: CPT | Performed by: NURSE PRACTITIONER

## 2022-08-09 PROCEDURE — 99213 OFFICE O/P EST LOW 20 MIN: CPT | Performed by: NURSE PRACTITIONER

## 2022-08-09 PROCEDURE — 95251 CONT GLUC MNTR ANALYSIS I&R: CPT | Performed by: NURSE PRACTITIONER

## 2022-08-09 PROCEDURE — 3052F HG A1C>EQUAL 8.0%<EQUAL 9.0%: CPT | Performed by: NURSE PRACTITIONER

## 2022-08-09 RX ORDER — PROCHLORPERAZINE 25 MG/1
1 SUPPOSITORY RECTAL
Qty: 1 EACH | Refills: 2 | Status: CANCELLED | OUTPATIENT
Start: 2022-08-09

## 2022-08-09 RX ORDER — PROCHLORPERAZINE 25 MG/1
SUPPOSITORY RECTAL
Qty: 3 EACH | Refills: 11 | Status: SHIPPED | OUTPATIENT
Start: 2022-08-09

## 2022-08-09 RX ORDER — INSULIN LISPRO 100 [IU]/ML
14 INJECTION, SOLUTION INTRAVENOUS; SUBCUTANEOUS
Qty: 15 ML | Refills: 2 | Status: SHIPPED | OUTPATIENT
Start: 2022-08-09

## 2022-08-09 RX ORDER — PROCHLORPERAZINE 25 MG/1
1 SUPPOSITORY RECTAL
Qty: 1 EACH | Refills: 3 | Status: SHIPPED | OUTPATIENT
Start: 2022-08-09

## 2022-08-09 RX ORDER — CARIPRAZINE 3 MG/1
CAPSULE, GELATIN COATED ORAL
COMMUNITY
Start: 2022-07-26 | End: 2022-11-14

## 2022-08-09 RX ORDER — FOLIC ACID 1 MG/1
TABLET ORAL
COMMUNITY
Start: 2022-08-06 | End: 2022-11-14

## 2022-08-09 NOTE — ASSESSMENT & PLAN NOTE
-Diabetes is improving with A1c down from 10.8 to 9.0.  -Discussed importance of yearly diabetic eye exams.  -Discussed importance of checking blood sugars regularly.  Using Dexcom CGM.  2-week data download shows the following:  Very high: 63%   high: 18%  In range: 18%  Low: <1%  Very low <1%  Trends shows hyperglycemia with significant postprandial hyperglycemia noted.  She has very wide variable BG's.   -Continue Levemir to 14 units twice daily.  -Increase Humalog 10-14 units 3 times daily with meals.  Discussed the MOA of insulin and appropriate timing.  She is to start taking this at the appropriate time.  -She would like to start with OmniPod 5 pump. She is contacting them to get trained on this and will be starting this soon.  -S/S hypoglycemia reviewed with rule of 15's advised.   -Follow-up in 3 months.

## 2022-08-09 NOTE — PROGRESS NOTES
"  Rain Barreto is a 36 y.o. female had concerns including Diabetes.      She is awaiting to be started on her Omnipod 5 pump for training.     Diabetes was diagnosed .  Complications include neuropathy.  Last ophtho exam was 2022, [place] [facility]  Current medications for diabetes include Levemir 16 units BID, Humalog 10-14 units TID with meals-she has been taking this after eating her meals.  She checks her blood sugar dexcom 288 times per day.   Hypos: rarely    The following portions of the patient's history were reviewed and updated as appropriate: allergies, current medications, past family history, past medical history, past social history, past surgical history and problem list.    Diet: She is very limited due to her recent gastric bypass surgery.    Past Medical History:   Diagnosis Date   • Anxiety    • Depression    • Diabetes mellitus (HCC)     Type I, has detachable pump. dx in    • Diabetes mellitus type I (HCC)    • Diverticulosis    • Dyspepsia    • Gallstones    • Gestational diabetes    • Heartburn     takes prn zantac and tums, EGD  [facility] dilatation, EGD Dr. Finnegan 21   • HLD (hyperlipidemia)    • Hypertension     denies   • Kidney infection    • Kidney stone    • Migraine    • MRSA carrier     PAT (+) 22   •  (normal spontaneous vaginal delivery)     1st child w/ preeclampsia   • PONV (postoperative nausea and vomiting)    • Tattoos    • Wears dentures     upper   • Wheezing     \"seasonal allergies\" uses inhalers prn     Past Surgical History:   Procedure Laterality Date   •  SECTION     •  SECTION     •  SECTION     • CHOLECYSTECTOMY N/A 2022    Procedure: CHOLECYSTECTOMY LAPAROSCOPIC;  Surgeon: Yunior Finnegan MD;  Location: [facility];  Service: General;  Laterality: N/A;   • COLONOSCOPY N/A 04/15/2019    Procedure: COLONOSCOPY W/ COLD FORCEP BIOPSIES; COLD SNARE POLYPECTOMY; COLD FORCEP POLYPECTOMY; " RESOLUTION CLIP X1;  Surgeon: Adán Pena MD;  Location: [facility] ENDOSCOPY;  Service: Gastroenterology   • D & C HYSTEROSCOPY ENDOMETRIAL ABLATION  2015   • ENDOSCOPY N/A 03/21/2019    Procedure: ESOPHAGOGASTRODUODENOSCOPY with cold biopsies and dilation;  Surgeon: Adán Pena MD;  Location: [facility] ENDOSCOPY;  Service: Gastroenterology   • ENDOSCOPY N/A 04/28/2022    Procedure: ESOPHAGOGASTRODUODENOSCOPY;  Surgeon: Yunior Finnegan MD;  Location:  [facility];  Service: Bariatric;  Laterality: N/A;   • GASTRIC RESTRICTION SURGERY     • GASTRIC SLEEVE LAPAROSCOPIC N/A 04/28/2022    Procedure: GASTRIC SLEEVE LAPAROSCOPIC;  Surgeon: Yunior Finnegan MD;  Location:  [facility];  Service: Bariatric;  Laterality: N/A;   • TEETH EXTRACTION      dentures on upper plate   • TOTAL ABDOMINAL HYSTERECTOMY  2019    low transverse.  [facility] - lap converted to open for scar tissue   • TUBAL ABDOMINAL LIGATION  2004      Family History   Problem Relation Age of Onset   • Colon cancer Paternal Uncle    • Colon cancer Maternal Grandmother    • Crohn's disease Maternal Grandmother    • Diabetes Maternal Grandmother    • Colon cancer Paternal Grandmother    • Other Paternal Grandmother         Colitis   • Diabetes Paternal Grandmother    • Hyperlipidemia Paternal Grandmother    • Hypertension Paternal Grandmother    • Sleep apnea Paternal Grandmother    • Colon cancer Paternal Grandfather    • Diabetes Paternal Grandfather    • Hypertension Paternal Grandfather    • Hypertension Mother    • Cancer Mother    • Diabetes Maternal Grandfather    • Hypertension Maternal Grandfather    • Stroke Maternal Grandfather    • No Known Problems Brother    • No Known Problems Son    • No Known Problems Son    • No Known Problems Daughter    • No Known Problems Daughter       Social History     Socioeconomic History   • Marital status:    Tobacco Use   • Smoking status: Former Smoker     Packs/day: 0.25     Years: 9.00      Pack years: 2.25     Types: Cigarettes     Quit date: 2011     Years since quittin.6   • Smokeless tobacco: Never Used   Vaping Use   • Vaping Use: Never used   Substance and Sexual Activity   • Alcohol use: Not Currently     Comment: social   • Drug use: No   • Sexual activity: Defer      Allergies   Allergen Reactions   • Aspirin Unknown - High Severity     Please avoid all IV and oral NSAIDs until after 2022 for high risk of life-threatening delayed gastric leak.  Please contact Bariatric Surgery service if there are questions.   • Morphine Itching   • Prednisone Other (See Comments)     Please avoid all IV, intraarticular, intramuscular, and oral steroids until after 2022 for high risk of life-threatening delayed gastric leak.  Please contact Bariatric Surgery service if there are questions.   • Toradol [Ketorolac Tromethamine] Other (See Comments)     Please avoid all IV and oral NSAIDs until after 2022 for high risk of life-threatening delayed gastric leak.  Please contact Bariatric Surgery service if there are questions.   • Adhesive Tape Dermatitis     Luisa. Tele electrodes   • Hydrocodone Itching and Rash   • Jardiance [Empagliflozin] Itching and Rash   • Latex Rash   • Trulicity [Dulaglutide] Nausea Only      Current Outpatient Medications on File Prior to Visit   Medication Sig Dispense Refill   • Continuous Blood Gluc Transmit (Dexcom G6 Transmitter) misc USE TO CHECK GLUCOSE AND CHANGE EVERY 3 MONTHS     • cyclobenzaprine (FLEXERIL) 10 MG tablet Take 1 tablet by mouth 3 (Three) Times a Day As Needed for Muscle Spasms. 21 tablet 0   • estradiol (ESTRACE) 1 MG tablet Take 1 mg by mouth Daily.     • Gvoke HypoPen 2-Pack 1 MG/0.2ML solution auto-injector      • Insulin Pen Needle (B-D UF III MINI PEN NEEDLES) 31G X 5 MM misc Use as directed four times daily. 200 each 3   • omeprazole (priLOSEC) 40 MG capsule Take 40 mg by mouth Daily.     • ondansetron (Zofran) 4 MG tablet  "Take 1 tablet by mouth Every 4 (Four) Hours As Needed for Nausea. (Patient taking differently: Take 4 mg by mouth 2 (Two) Times a Day.) 20 tablet 0   • [DISCONTINUED] insulin detemir (LEVEMIR) 100 UNIT/ML injection Inject 14 Units under the skin into the appropriate area as directed 2 (Two) Times a Day. 15 mL 2   • [DISCONTINUED] Insulin Lispro, 1 Unit Dial, (HUMALOG) 100 UNIT/ML solution pen-injector Inject 10 Units under the skin into the appropriate area as directed 3 (Three) Times a Day With Meals. 15 mL 2   • folic acid (FOLVITE) 1 MG tablet      • Insulin Disposable Pump (Omnipod 5 G6 Intro, Gen 5,) kit Use 1 kit Continuously as directed 1 kit 0   • Vraylar 3 MG capsule capsule TAKE 1 CAPSULE BY MOUTH ONCE DAILY AS DIRECTED       No current facility-administered medications on file prior to visit.        Review of Systems   Constitutional: Positive for fatigue. Negative for unexpected weight gain and unexpected weight loss.   Eyes: Negative.    Endocrine: Negative for polydipsia, polyphagia and polyuria.   Psychiatric/Behavioral: Negative for sleep disturbance.   All other systems reviewed and are negative.    /88 (BP Location: Left arm, Patient Position: Sitting, Cuff Size: Adult)   Pulse 79   Ht 152.4 cm (60\")   Wt 77.8 kg (171 lb 9.6 oz)   LMP  (LMP Unknown)   SpO2 99%   BMI 33.51 kg/m²      Physical Exam  Vitals reviewed.   Constitutional:       Appearance: Normal appearance.   Eyes:      Extraocular Movements: Extraocular movements intact.   Cardiovascular:      Rate and Rhythm: Normal rate.      Pulses: Normal pulses.   Pulmonary:      Effort: Pulmonary effort is normal.   Skin:     General: Skin is warm and dry.   Neurological:      Mental Status: She is alert and oriented to person, place, and time.   Psychiatric:         Mood and Affect: Mood normal.         Behavior: Behavior normal.         Thought Content: Thought content normal.         Judgment: Judgment normal.         CMP:  Lab " Results   Component Value Date     (H) 07/30/2021    BUN 9 06/06/2022    CREATININE 0.51 (L) 06/06/2022    EGFRIFNONA 104 02/21/2022    EGFRIFAFRI >60 07/30/2021    BCR 18 06/06/2022     06/06/2022    K 4.4 06/06/2022    CO2 24 06/06/2022    CALCIUM 9.6 06/06/2022    PROTENTOTREF 6.6 06/06/2022    ALBUMIN 4.3 06/06/2022    LABGLOBREF 2.3 06/06/2022    LABIL2 1.9 06/06/2022    BILITOT 0.5 06/06/2022    ALKPHOS 91 06/06/2022    AST 16 06/06/2022    ALT 14 06/06/2022     Lipid Panel:  Lab Results   Component Value Date    TRIG 230 (H) 06/29/2021    HDL 56 06/29/2021    VLDL 37 06/29/2021    LDL 68 06/29/2021     HbA1c:  Lab Results   Component Value Date    HGBA1C 9.0 08/09/2022    HGBA1C 10.80 (H) 05/09/2022     Glucose:  Lab Results   Component Value Date    POCGLU 263 (A) 08/09/2022     Microalbumin:  No results found for: NENOREJEANNIETIO  TSH:  Lab Results   Component Value Date    TSH 1.450 06/29/2021       Assessment and Plan    Diagnoses and all orders for this visit:    1. Type 1 diabetes mellitus with hyperglycemia (HCC) (Primary)  Assessment & Plan:  -Diabetes is improving with A1c down from 10.8 to 9.0.  -Discussed importance of yearly diabetic eye exams.  -Discussed importance of checking blood sugars regularly.  Using Dexcom CGM.  2-week data download shows the following:  Very high: 63%   high: 18%  In range: 18%  Low: <1%  Very low <1%  Trends shows hyperglycemia with significant postprandial hyperglycemia noted.  She has very wide variable BG's.   -Continue Levemir to 14 units twice daily.  -Increase Humalog 10-14 units 3 times daily with meals.  Discussed the MOA of insulin and appropriate timing.  She is to start taking this at the appropriate time.  -She would like to start with OmniPod 5 pump. She is contacting them to get trained on this and will be starting this soon.  -S/S hypoglycemia reviewed with rule of 15's advised.   -Follow-up in 3 months.    Orders:  -     POC Glucose  Fingerstick  -     POC Glycosylated Hemoglobin (Hb A1C)  -     insulin detemir (LEVEMIR) 100 UNIT/ML injection; Inject 14 Units under the skin into the appropriate area as directed 2 (Two) Times a Day.  Dispense: 15 mL; Refill: 2  -     Insulin Lispro, 1 Unit Dial, (HUMALOG) 100 UNIT/ML solution pen-injector; Inject 14 Units under the skin into the appropriate area as directed 3 (Three) Times a Day With Meals.  Dispense: 15 mL; Refill: 2    Other orders  -     Continuous Blood Gluc Sensor (Dexcom G6 Sensor); Every 10 (Ten) Days.  Dispense: 3 each; Refill: 11  -     Continuous Blood Gluc Transmit (Dexcom G6 Transmitter) misc; 1 each Every 3 (Three) Months.  Dispense: 1 each; Refill: 3       Return in about 3 months (around 11/9/2022) for Follow-up appointment, A1C. The patient was instructed to contact the clinic with any interval questions or concerns.        This document has been electronically signed by SATHISH Fitzpatrick  August 9, 2022 13:34 EDT   Endocrinology    Please note that portions of this document were completed with a voice recognition program. Efforts were made to edit the dictations, but occasionally words are mis-transcribed.

## 2022-08-15 ENCOUNTER — HOSPITAL ENCOUNTER (OUTPATIENT)
Dept: DIABETES SERVICES | Facility: HOSPITAL | Age: 37
Setting detail: RECURRING SERIES
Discharge: HOME | End: 2022-08-15

## 2022-08-15 ENCOUNTER — OFFICE VISIT (OUTPATIENT)
Dept: OTHER | Facility: HOSPITAL | Age: 37
End: 2022-08-15

## 2022-08-15 ENCOUNTER — TELEMEDICINE (OUTPATIENT)
Dept: BARIATRICS/WEIGHT MGMT | Facility: CLINIC | Age: 37
End: 2022-08-15

## 2022-08-15 VITALS — WEIGHT: 171 LBS | HEIGHT: 60 IN | BODY MASS INDEX: 33.57 KG/M2

## 2022-08-15 DIAGNOSIS — E66.9 OBESITY, CLASS I, BMI 30-34.9: Primary | ICD-10-CM

## 2022-08-15 DIAGNOSIS — R53.83 FATIGUE, UNSPECIFIED TYPE: ICD-10-CM

## 2022-08-15 DIAGNOSIS — Z13.0 SCREENING FOR IRON DEFICIENCY ANEMIA: ICD-10-CM

## 2022-08-15 DIAGNOSIS — E55.9 HYPOVITAMINOSIS D: ICD-10-CM

## 2022-08-15 DIAGNOSIS — K91.2 POSTGASTRECTOMY MALABSORPTION: ICD-10-CM

## 2022-08-15 DIAGNOSIS — Z13.21 MALNUTRITION SCREEN: ICD-10-CM

## 2022-08-15 DIAGNOSIS — Z90.3 POSTGASTRECTOMY MALABSORPTION: ICD-10-CM

## 2022-08-15 PROCEDURE — G0108 DIAB MANAGE TRN  PER INDIV: HCPCS

## 2022-08-15 PROCEDURE — 99214 OFFICE O/P EST MOD 30 MIN: CPT | Performed by: PHYSICIAN ASSISTANT

## 2022-08-15 NOTE — CONSULTS
Diabetes Education    Patient Name:  Rain Barreto  YOB: 1985  MRN: [id number]  Admit Date:  8/15/2022        Pt attended scheduled diabetes education class--120 minute in person session with RN and RD. Please see media tab for assessment and notes if you use EPIC. If you are not an EPIC user a copy of patient's assessment and notes will be sent per routine. Thank you for the referral!     Electronically signed by:  Maryana Holden RN, [facility]  08/15/22 13:03 EDT

## 2022-08-15 NOTE — PROGRESS NOTES
"[facility] Bariatric Surgery  [address]  [address]  [facility] [id number] [phone]    This visit was conducted as a video visit, in an effort to limit spread of the novel coronavirus during the COVID-19 pandemic.  The patient gave consent.      Patient Name:  Rain Barreto  :  1985      Date of Visit: 08/15/2022      Reason for Visit:   3 months postop      HPI: Rain Barreto is a 36 y.o. female s/pLSG/ella 22 GDW     Doing well. A1c is lowest it has been in years.  Having some nausea when she takes her medications in the morning-this is prior before to eating.  Otherwise no issues with nausea.  No other issues/concerns. Denies dysphagia, reflux, vomiting, abdominal pain, pulmonary issues, fevers, hair loss, memory loss, vision changes and numbness/tingling.  Getting 80-100g prot/day.  Drinking 64 fluid oz/day.  1 month labs revealed bariatric levels wnl. Taking MVI, B12, Biotin and folic acid.  Not on PPI.   Physical activity: going to the gym 5 days- strength and cardio..     Presurgery weight: 192 pounds.  Today's weight is 77.6 kg (171 lb) pounds, today's  Body mass index is 33.4 kg/m²., and weight loss since surgery is 21 pounds.      Past Medical History:   Diagnosis Date   • Anxiety    • Depression    • Diabetes mellitus (HCC)     Type I, has detachable pump. dx in    • Diabetes mellitus type I (HCC)    • Diverticulosis    • Dyspepsia    • Gallstones    • Gestational diabetes    • Heartburn     takes prn zantac and tums, EGD  [facility] [place], EGD Dr. Finnegan 21   • HLD (hyperlipidemia)    • Hypertension     denies   • Kidney infection    • Kidney stone    • Migraine    • MRSA carrier     PAT (+) 22   •  (normal spontaneous vaginal delivery)     1st child w/ preeclampsia   • PONV (postoperative nausea and vomiting)    • Tattoos    • Wears dentures     upper   • Wheezing     \"seasonal allergies\" uses inhalers prn     Past Surgical " History:   Procedure Laterality Date   •  SECTION     •  SECTION     •  SECTION     • CHOLECYSTECTOMY N/A 2022    Procedure: CHOLECYSTECTOMY LAPAROSCOPIC;  Surgeon: Yunior Finnegan MD;  Location:  [facility];  Service: General;  Laterality: N/A;   • COLONOSCOPY N/A 04/15/2019    Procedure: COLONOSCOPY W/ COLD FORCEP BIOPSIES; COLD SNARE POLYPECTOMY; COLD FORCEP POLYPECTOMY; RESOLUTION CLIP X1;  Surgeon: Adán Pena MD;  Location: [facility] ENDOSCOPY;  Service: Gastroenterology   • D & C HYSTEROSCOPY ENDOMETRIAL ABLATION     • ENDOSCOPY N/A 2019    Procedure: ESOPHAGOGASTRODUODENOSCOPY with cold biopsies and dilation;  Surgeon: Adán Pena MD;  Location: [facility] ENDOSCOPY;  Service: Gastroenterology   • ENDOSCOPY N/A 2022    Procedure: ESOPHAGOGASTRODUODENOSCOPY;  Surgeon: Yunior Finnegan MD;  Location:  [facility];  Service: Bariatric;  Laterality: N/A;   • GASTRIC RESTRICTION SURGERY     • GASTRIC SLEEVE LAPAROSCOPIC N/A 2022    Procedure: GASTRIC SLEEVE LAPAROSCOPIC;  Surgeon: Yunior Finnegan MD;  Location:  [facility];  Service: Bariatric;  Laterality: N/A;   • TEETH EXTRACTION      dentures on upper plate   • TOTAL ABDOMINAL HYSTERECTOMY      low transverse.  [facility] - lap converted to open for scar tissue   • TUBAL ABDOMINAL LIGATION       Outpatient Medications Marked as Taking for the 8/15/22 encounter (Telemedicine) with Bridget Andino PA   Medication Sig Dispense Refill   • Continuous Blood Gluc Sensor (Dexcom G6 Sensor) Every 10 (Ten) Days. 3 each 11   • Continuous Blood Gluc Transmit (Dexcom G6 Transmitter) misc USE TO CHECK GLUCOSE AND CHANGE EVERY 3 MONTHS     • Continuous Blood Gluc Transmit (Dexcom G6 Transmitter) misc 1 each Every 3 (Three) Months. 1 each 3   • cyclobenzaprine (FLEXERIL) 10 MG tablet Take 1 tablet by mouth 3 (Three) Times a Day As Needed for Muscle Spasms. 21 tablet 0   • estradiol (ESTRACE)  1 MG tablet Take 1 mg by mouth Daily.     • folic acid (FOLVITE) 1 MG tablet      • Gvoke HypoPen 2-Pack 1 MG/0.2ML solution auto-injector      • insulin detemir (LEVEMIR) 100 UNIT/ML injection Inject 14 Units under the skin into the appropriate area as directed 2 (Two) Times a Day. 15 mL 2   • Insulin Disposable Pump (Omnipod 5 G6 Intro, Gen 5,) kit Use 1 kit Continuously as directed 1 kit 0   • Insulin Lispro, 1 Unit Dial, (HUMALOG) 100 UNIT/ML solution pen-injector Inject 14 Units under the skin into the appropriate area as directed 3 (Three) Times a Day With Meals. 15 mL 2   • Insulin Pen Needle (B-D UF III MINI PEN NEEDLES) 31G X 5 MM misc Use as directed four times daily. 200 each 3   • omeprazole (priLOSEC) 40 MG capsule Take 40 mg by mouth Daily.     • ondansetron (Zofran) 4 MG tablet Take 1 tablet by mouth Every 4 (Four) Hours As Needed for Nausea. (Patient taking differently: Take 4 mg by mouth 2 (Two) Times a Day.) 20 tablet 0   • Vraylar 3 MG capsule capsule TAKE 1 CAPSULE BY MOUTH ONCE DAILY AS DIRECTED         Allergies   Allergen Reactions   • Aspirin Unknown - High Severity     Please avoid all IV and oral NSAIDs until after June 9, 2022 for high risk of life-threatening delayed gastric leak.  Please contact Bariatric Surgery service if there are questions.   • Morphine Itching   • Prednisone Other (See Comments)     Please avoid all IV, intraarticular, intramuscular, and oral steroids until after June 23, 2022 for high risk of life-threatening delayed gastric leak.  Please contact Bariatric Surgery service if there are questions.   • Toradol [Ketorolac Tromethamine] Other (See Comments)     Please avoid all IV and oral NSAIDs until after June 9, 2022 for high risk of life-threatening delayed gastric leak.  Please contact Bariatric Surgery service if there are questions.   • Adhesive Tape Dermatitis     Luisa. Tele electrodes   • Hydrocodone Itching and Rash   • Jardiance [Empagliflozin] Itching and  "Rash   • Latex Rash   • Trulicity [Dulaglutide] Nausea Only       Social History     Socioeconomic History   • Marital status:    Tobacco Use   • Smoking status: Former Smoker     Packs/day: 0.25     Years: 9.00     Pack years: 2.25     Types: Cigarettes     Quit date: 2011     Years since quittin.6   • Smokeless tobacco: Never Used   Vaping Use   • Vaping Use: Never used   Substance and Sexual Activity   • Alcohol use: Not Currently     Comment: social   • Drug use: No   • Sexual activity: Defer     Social History     Social History Narrative    Lives in [facility], close to [place].   with four children.  Works as a groomer.         Ht 152.4 cm (60\")   Wt 77.6 kg (171 lb)   LMP  (LMP Unknown)   BMI 33.40 kg/m²     Physical Exam  Constitutional:       General: She is not in acute distress.  Pulmonary:      Effort: Pulmonary effort is normal.   Neurological:      Mental Status: She is alert and oriented to person, place, and time.   Psychiatric:         Mood and Affect: Mood normal.         Behavior: Behavior normal.         Thought Content: Thought content normal.         Judgment: Judgment normal.           Assessment:  3 months s/p LSG/ella 22 GDW    ICD-10-CM ICD-9-CM   1. Obesity, Class I, BMI 30-34.9  E66.9 278.00   2. Fatigue, unspecified type  R53.83 780.79   3. Malnutrition screen  Z13.21 V77.2   4. Postgastrectomy malabsorption  K91.2 579.3    Z90.3    5. Screening for iron deficiency anemia  Z13.0 V78.0   6. Hypovitaminosis D  E55.9 268.9       BMI is >= 30 and <35. (Class 1 Obesity). The following options were offered after discussion;: exercise counseling/recommendations and nutrition counseling/recommendations      Plan:  Doing well.  Have suggested she try taking medications after she has eaten breakfast.  Continue w/ good food choices and healthy habits.  Continue protein >70g/day.  Continue routine physical activity.  Routine bariatric labs ordered.  Continue " vitamins w/ adjustments pending lab results.  Call w/ problems/concerns.     The patient was instructed to follow up in 3 months, sooner if needed.    I spent 30 minutes caring for this patient on this date of service. This time includes time spent by me in the following activities: preparing for the visit, reviewing tests, performing a medically appropriate examination and/or evaluation, ordering medications, tests, or procedures and documenting information in the medical record.

## 2022-08-17 DIAGNOSIS — E10.65 TYPE 1 DIABETES MELLITUS WITH HYPERGLYCEMIA: Primary | ICD-10-CM

## 2022-08-29 ENCOUNTER — APPOINTMENT (OUTPATIENT)
Dept: DIABETES SERVICES | Facility: HOSPITAL | Age: 37
End: 2022-08-29

## 2022-09-15 ENCOUNTER — TELEPHONE (OUTPATIENT)
Dept: ENDOCRINOLOGY | Facility: CLINIC | Age: 37
End: 2022-09-15

## 2022-09-15 RX ORDER — INSULIN PMP CART,AUT,G6/7,CNTR
1 EACH SUBCUTANEOUS
Qty: 10 EACH | Refills: 2 | Status: SHIPPED | OUTPATIENT
Start: 2022-09-15

## 2022-09-19 ENCOUNTER — HOSPITAL ENCOUNTER (OUTPATIENT)
Dept: DIABETES SERVICES | Facility: HOSPITAL | Age: 37
Setting detail: RECURRING SERIES
Discharge: HOME | End: 2022-09-19

## 2022-09-19 ENCOUNTER — APPOINTMENT (OUTPATIENT)
Dept: OTHER | Facility: HOSPITAL | Age: 37
End: 2022-09-19

## 2022-09-19 NOTE — PROGRESS NOTES
DIABETES EDUCATION CONSULT, 30 minutes diabetes nutrition education.  This medical referred consult was provided as a telephone call, tele-health or e-visit, as patient is unable to attend an in-office appointment due to the COVID-19 crisis. Consent for treatment was given verbally. Please see media tab for assessment and notes if you use EPIC. If you are not an EPIC user a copy of patient's assessment and notes will be sent per routine. Thank you. --- Abril Garcia, MPH, RD, LD, CDCES

## 2022-11-14 ENCOUNTER — SPECIALTY PHARMACY (OUTPATIENT)
Dept: PHARMACY | Facility: HOSPITAL | Age: 37
End: 2022-11-14

## 2022-11-14 ENCOUNTER — OFFICE VISIT (OUTPATIENT)
Dept: ENDOCRINOLOGY | Facility: CLINIC | Age: 37
End: 2022-11-14

## 2022-11-14 VITALS
WEIGHT: 174.8 LBS | DIASTOLIC BLOOD PRESSURE: 72 MMHG | HEIGHT: 61 IN | BODY MASS INDEX: 33 KG/M2 | HEART RATE: 92 BPM | OXYGEN SATURATION: 98 % | SYSTOLIC BLOOD PRESSURE: 110 MMHG

## 2022-11-14 DIAGNOSIS — E10.65 TYPE 1 DIABETES MELLITUS WITH HYPERGLYCEMIA: Primary | ICD-10-CM

## 2022-11-14 LAB
EXPIRATION DATE: NORMAL
GLUCOSE BLDC GLUCOMTR-MCNC: 235 MG/DL (ref 70–130)
HBA1C MFR BLD: 9.2 %
Lab: NORMAL

## 2022-11-14 PROCEDURE — 3046F HEMOGLOBIN A1C LEVEL >9.0%: CPT | Performed by: NURSE PRACTITIONER

## 2022-11-14 PROCEDURE — 95251 CONT GLUC MNTR ANALYSIS I&R: CPT | Performed by: NURSE PRACTITIONER

## 2022-11-14 PROCEDURE — 83036 HEMOGLOBIN GLYCOSYLATED A1C: CPT | Performed by: NURSE PRACTITIONER

## 2022-11-14 PROCEDURE — 99213 OFFICE O/P EST LOW 20 MIN: CPT | Performed by: NURSE PRACTITIONER

## 2022-11-14 NOTE — PROGRESS NOTES
"   Specialty Pharmacy Patient Management Program  Endocrinology Reassessment     Rain Barreto is a 36 y.o. female with Type 1 Diabetes seen by an Endocrinology provider and enrolled in the Endocrinology Patient Management program offered by [facility].  A follow-up outreach was conducted, including assessment of continued therapy appropriateness, medication adherence, and side effect incidence and management for Insulin therapy, insulin pump and CGM.     Patient is currently using Humalog in her OmniPod 5 insulin pump. She uses Dexcom to monitor her BG. Patient denies low BG <70. She uses [facility] in [place] for pump supplies, [facility] in [place] for Dexcom supplies, and [facility] in [place] for insulin.     Changes to Insurance Coverage or Financial Support  None    Relevant Past Medical History and Comorbidities  Relevant medical history and concomitant health conditions were discussed with the patient. The patient's chart has been reviewed for relevant past medical history and comorbid health conditions and updated as necessary.   Past Medical History:   Diagnosis Date   • Anxiety    • Depression    • Diabetes mellitus (HCC)     Type I, has detachable pump. dx in 's   • Diabetes mellitus type I (HCC)    • Diverticulosis    • Dyspepsia    • Gallstones    • Gestational diabetes    • Heartburn     takes prn zantac and tums, EGD  witth dilatation, EGD Dr. Finnegan 21   • HLD (hyperlipidemia)    • Hypertension     denies   • Kidney infection    • Kidney stone    • Migraine    • MRSA carrier     PAT (+) 22   •  (normal spontaneous vaginal delivery)     1st child w/ preeclampsia   • PONV (postoperative nausea and vomiting)    • Tattoos    • Wears dentures     upper   • Wheezing     \"seasonal allergies\" uses inhalers prn     Social History     Socioeconomic History   • Marital status:    Tobacco Use   • Smoking status: Former     " Packs/day: 0.25     Years: 9.00     Pack years: 2.25     Types: Cigarettes     Quit date: 2011     Years since quittin.8   • Smokeless tobacco: Never   Vaping Use   • Vaping Use: Never used   Substance and Sexual Activity   • Alcohol use: Not Currently     Comment: social   • Drug use: No   • Sexual activity: Defer       Problem list reviewed by Margoth Riddle RPH on 2022 at  9:13 AM     Allergies  Known allergies and reactions were discussed with the patient. The patient's chart has been reviewed for allergy information and updated as necessary.   Aspirin, Morphine, Prednisone, Toradol [ketorolac tromethamine], Adhesive tape, Hydrocodone, Jardiance [empagliflozin], Latex, and Trulicity [dulaglutide]    Allergies reviewed by Margoth Riddle RPH on 2022 at  9:11 AM    Relevant Laboratory Values  A1C Last 3 Results    HGBA1C Last 3 Results 22   Hemoglobin A1C 10.80 (A) 9.0 9.2   (A) Abnormal value            Lab Results   Component Value Date    HGBA1C 9.2 2022     Lab Results   Component Value Date    GLUCOSE 138 (H) 2022    CALCIUM 9.6 2022     2022    K 4.4 2022    CO2 24 2022     2022    BUN 9 2022    CREATININE 0.51 (L) 2022    EGFRIFAFRI >60 2021    EGFRIFNONA 104 2022    BCR 18 2022    ANIONGAP 13.0 2022     Lab Results   Component Value Date    CHLPL 161 2021    TRIG 230 (H) 2021    HDL 56 2021    LDL 68 2021         Current Medication List  This medication list has been reviewed with the patient and evaluated for any interactions or necessary modifications/recommendations, and updated to include all prescription medications, OTC medications, and supplements the patient is currently taking.  This list reflects what is contained in the patient's profile, which has also been marked as reviewed to communicate to other providers it is the most up to date version  of the patient's current medication therapy.     Current Outpatient Medications:   •  Continuous Blood Gluc Sensor (Dexcom G6 Sensor), Every 10 (Ten) Days., Disp: 3 each, Rfl: 11  •  Continuous Blood Gluc Transmit (Dexcom G6 Transmitter) misc, USE TO CHECK GLUCOSE AND CHANGE EVERY 3 MONTHS, Disp: , Rfl:   •  Continuous Blood Gluc Transmit (Dexcom G6 Transmitter) misc, 1 each Every 3 (Three) Months., Disp: 1 each, Rfl: 3  •  Gvoke HypoPen 2-Pack 1 MG/0.2ML solution auto-injector, , Disp: , Rfl:   •  Insulin Disposable Pump (Omnipod 5 G6 Pod, Gen 5,) misc, 1 each Every 3 (Three) Days., Disp: 10 each, Rfl: 2  •  Insulin Lispro, 1 Unit Dial, (HUMALOG) 100 UNIT/ML solution pen-injector, Inject 14 Units under the skin into the appropriate area as directed 3 (Three) Times a Day With Meals. (Patient taking differently: Use in pump), Disp: 15 mL, Rfl: 2  •  Insulin Pen Needle (B-D UF III MINI PEN NEEDLES) 31G X 5 MM misc, Use as directed four times daily., Disp: 200 each, Rfl: 3  •  insulin detemir (LEVEMIR) 100 UNIT/ML injection, Inject 14 Units under the skin into the appropriate area as directed 2 (Two) Times a Day., Disp: 15 mL, Rfl: 2  •  insulin lispro (humaLOG) 100 UNIT/ML injection, For use in insulin pump.  MDD: 100, Disp: 30 mL, Rfl: 3    Medicines reviewed by Margoth Riddle [facility] on 11/14/2022 at  9:13 AM    Drug Interactions  None relevant to diabetes care    Recommended Medications Assessment  • Aspirin - Not Indicated  • Statin - Not Indicated  • ACEi/ARB - Not Indicated    Current 10-Year ASCVD Risk: cannot calculate due to age    Adverse Drug Reactions  • Adverse Reactions Experienced: None  • Plan for ADR Management: None required     Hospitalizations and Urgent Care Since Last Assessment  • Hospitalizations or Admissions: None  • ED Visits: None   • Urgent Office Visits: None     Adherence and Self-Administration  Adherence related patient's specialty therapy was discussed with the patient. The Adherence  segment of this outreach has been reviewed and updated.     • Ongoing or New Barriers to Patient Adherence and/or Self-Administration: None  • Methods for Supporting Patient Adherence and/or Self-Administration: None Required    Goals of Therapy  Goals related to the patient's specialty therapy was discussed with the patient. The Patient Goals segment of this outreach has been reviewed and updated.    Goals     • HEMOGLOBIN A1C < 7             Reassessment Plan & Follow-Up  · Patient's diabetes is uncontrolled with A1C of 9.2%. Her Dexcom reports a pattern of daytime and night time highs with the following TIR: 63% Very high, 16% High, 20% In range , <1% Low, and 0% Very low. She needs adjustments to her pump settings today to help with all day hyperglycemia.    · Medication Therapy Changes: None discussed with patient   · Additional Plans, Therapy Recommendations, or Therapy Problems to Be Addressed: None    Patient does not wish to use [facility] services at this time due to: loyalty to retail pharmacy     Attestation  I attest that the specialty medication(s) addressed above are appropriate for the patient based on my reassessment.  If the prescribed therapy is at any point deemed not appropriate based on the current or future assessments, a consultation will be initiated with the patient's specialty care provider to determine the best course of action. The revised plan of therapy will be documented along with any additional patient education provided.     Margoth Riddle RPH  11/14/2022  10:09 EST

## 2022-11-14 NOTE — PROGRESS NOTES
"  Rain Barreto is a 36 y.o. female had concerns including Diabetes (F/u).    T1DM.    Diabetes was diagnosed .  Complications include neuropathy.  Last ophtho exam was 2022, [facility].  Current medications for diabetes include Humalog in an Omnipod 5 insulin pump.  She checks her blood sugar dexcom 288 times per day.   Hypos: rarely    Current pump settings:  Basal: 0.85 u/hr  CR: 1:11  CF: 1:42    The following portions of the patient's history were reviewed and updated as appropriate: allergies, current medications, past family history, past medical history, past social history, past surgical history and problem list.    Diet: She is very limited due to her recent gastric bypass surgery.    Past Medical History:   Diagnosis Date   • Anxiety    • Depression    • Diabetes mellitus (HCC)     Type I, has detachable pump. dx in    • Diabetes mellitus type I (HCC)    • Diverticulosis    • Dyspepsia    • Gallstones    • Gestational diabetes    • Heartburn     takes prn zantac and tums, EGD  [facility] dilatation, EGD Dr. Finnegan 21   • HLD (hyperlipidemia)    • Hypertension     denies   • Kidney infection    • Kidney stone    • Migraine    • MRSA carrier     PAT (+) 22   •  (normal spontaneous vaginal delivery)     1st child w/ preeclampsia   • PONV (postoperative nausea and vomiting)    • Tattoos    • Wears dentures     upper   • Wheezing     \"seasonal allergies\" uses inhalers prn     Past Surgical History:   Procedure Laterality Date   •  SECTION     •  SECTION     •  SECTION     • CHOLECYSTECTOMY N/A 2022    Procedure: CHOLECYSTECTOMY LAPAROSCOPIC;  Surgeon: Yunior Finnegan MD;  [facility]: [facility];  Service: General;  Laterality: N/A;   • COLONOSCOPY N/A 04/15/2019    Procedure: COLONOSCOPY W/ COLD FORCEP BIOPSIES; COLD SNARE POLYPECTOMY; COLD FORCEP POLYPECTOMY; RESOLUTION CLIP X1;  Surgeon: Adán Pena MD;  Location: [facility]" [facility] ENDOSCOPY;  Service: Gastroenterology   • D & C HYSTEROSCOPY ENDOMETRIAL ABLATION  2015   • ENDOSCOPY N/A 03/21/2019    Procedure: ESOPHAGOGASTRODUODENOSCOPY with cold biopsies and dilation;  Surgeon: Adán Pena MD;  Location: [facility] ENDOSCOPY;  Service: Gastroenterology   • ENDOSCOPY N/A 04/28/2022    Procedure: ESOPHAGOGASTRODUODENOSCOPY;  Surgeon: Yunior Finnegan MD;  Location:  [facility] OR;  Service: Bariatric;  Laterality: N/A;   • GASTRIC RESTRICTION SURGERY     • GASTRIC SLEEVE LAPAROSCOPIC N/A 04/28/2022    Procedure: GASTRIC SLEEVE LAPAROSCOPIC;  Surgeon: Yunior Finnegan MD;  Location:  [facility] OR;  Service: Bariatric;  Laterality: N/A;   • TEETH EXTRACTION      dentures on upper plate   • TOTAL ABDOMINAL HYSTERECTOMY  2019    low transverse.  [facility] - lap converted to open for scar tissue   • TUBAL ABDOMINAL LIGATION  2004      Family History   Problem Relation Age of Onset   • Colon cancer Paternal Uncle    • Colon cancer Maternal Grandmother    • Crohn's disease Maternal Grandmother    • Diabetes Maternal Grandmother    • Colon cancer Paternal Grandmother    • Other Paternal Grandmother         Colitis   • Diabetes Paternal Grandmother    • Hyperlipidemia Paternal Grandmother    • Hypertension Paternal Grandmother    • Sleep apnea Paternal Grandmother    • Colon cancer Paternal Grandfather    • Diabetes Paternal Grandfather    • Hypertension Paternal Grandfather    • Hypertension Mother    • Cancer Mother    • Diabetes Maternal Grandfather    • Hypertension Maternal Grandfather    • Stroke Maternal Grandfather    • No Known Problems Brother    • No Known Problems Son    • No Known Problems Son    • No Known Problems Daughter    • No Known Problems Daughter       Social History     Socioeconomic History   • Marital status:    Tobacco Use   • Smoking status: Former     Packs/day: 0.25     Years: 9.00     Pack years: 2.25     Types: Cigarettes     Quit date: 1/1/2011     Years  since quittin.8   • Smokeless tobacco: Never   Vaping Use   • Vaping Use: Never used   Substance and Sexual Activity   • Alcohol use: Not Currently     Comment: social   • Drug use: No   • Sexual activity: Defer      Allergies   Allergen Reactions   • Aspirin Unknown - High Severity     Please avoid all IV and oral NSAIDs until after 2022 for high risk of life-threatening delayed gastric leak.  Please contact Bariatric Surgery service if there are questions.   • Morphine Itching   • Prednisone Other (See Comments)     Please avoid all IV, intraarticular, intramuscular, and oral steroids until after 2022 for high risk of life-threatening delayed gastric leak.  Please contact Bariatric Surgery service if there are questions.   • Toradol [Ketorolac Tromethamine] Other (See Comments)     Please avoid all IV and oral NSAIDs until after 2022 for high risk of life-threatening delayed gastric leak.  Please contact Bariatric Surgery service if there are questions.   • Adhesive Tape Dermatitis     Luisa. Tele electrodes   • Hydrocodone Itching and Rash   • Jardiance [Empagliflozin] Itching and Rash   • Latex Rash   • Trulicity [Dulaglutide] Nausea Only      Current Outpatient Medications on File Prior to Visit   Medication Sig Dispense Refill   • Continuous Blood Gluc Sensor (Dexcom G6 Sensor) Every 10 (Ten) Days. 3 each 11   • Continuous Blood Gluc Transmit (Dexcom G6 Transmitter) misc USE TO CHECK GLUCOSE AND CHANGE EVERY 3 MONTHS     • Continuous Blood Gluc Transmit (Dexcom G6 Transmitter) misc 1 each Every 3 (Three) Months. 1 each 3   • Gvoke HypoPen 2-Pack 1 MG/0.2ML solution auto-injector      • insulin detemir (LEVEMIR) 100 UNIT/ML injection Inject 14 Units under the skin into the appropriate area as directed 2 (Two) Times a Day. 15 mL 2   • Insulin Disposable Pump (Omnipod 5 G6 Pod, Gen 5,) misc 1 each Every 3 (Three) Days. 10 each 2   • Insulin Lispro, 1 Unit Dial, (HUMALOG) 100 UNIT/ML  "solution pen-injector Inject 14 Units under the skin into the appropriate area as directed 3 (Three) Times a Day With Meals. (Patient taking differently: Use in pump) 15 mL 2   • Insulin Pen Needle (B-D UF III MINI PEN NEEDLES) 31G X 5 MM misc Use as directed four times daily. 200 each 3   • [DISCONTINUED] cyclobenzaprine (FLEXERIL) 10 MG tablet Take 1 tablet by mouth 3 (Three) Times a Day As Needed for Muscle Spasms. 21 tablet 0   • [DISCONTINUED] estradiol (ESTRACE) 1 MG tablet Take 1 mg by mouth Daily.     • [DISCONTINUED] folic acid (FOLVITE) 1 MG tablet      • [DISCONTINUED] omeprazole (priLOSEC) 40 MG capsule Take 40 mg by mouth Daily.     • [DISCONTINUED] ondansetron (Zofran) 4 MG tablet Take 1 tablet by mouth Every 4 (Four) Hours As Needed for Nausea. (Patient taking differently: Take 4 mg by mouth 2 (Two) Times a Day.) 20 tablet 0   • [DISCONTINUED] Vraylar 3 MG capsule capsule TAKE 1 CAPSULE BY MOUTH ONCE DAILY AS DIRECTED       No current facility-administered medications on file prior to visit.        Review of Systems   Constitutional: Positive for fatigue. Negative for unexpected weight gain and unexpected weight loss.   Eyes: Negative.    Endocrine: Negative for polydipsia, polyphagia and polyuria.   Psychiatric/Behavioral: Negative for sleep disturbance.   All other systems reviewed and are negative.    /72 (BP Location: Left arm, Patient Position: Sitting, Cuff Size: Adult)   Pulse 92   Ht 154.9 cm (61\")   Wt 79.3 kg (174 lb 12.8 oz)   LMP  (LMP Unknown)   SpO2 98%   BMI 33.03 kg/m²      Physical Exam  Vitals reviewed.   Constitutional:       Appearance: Normal appearance.   Eyes:      Extraocular Movements: Extraocular movements intact.   Cardiovascular:      Rate and Rhythm: Normal rate.      Pulses:           Dorsalis pedis pulses are 2+ on the right side and 2+ on the left side.        Posterior tibial pulses are 2+ on the right side and 2+ on the left side.   Pulmonary:      Effort: " Pulmonary effort is normal.   Feet:      Right foot:      Protective Sensation: 10 sites tested. 4 sites sensed.      Skin integrity: Skin integrity normal.      Toenail Condition: Right toenails are normal.      Left foot:      Protective Sensation: 10 sites tested. 4 sites sensed.      Skin integrity: Skin integrity normal.      Toenail Condition: Left toenails are normal.      Comments: Diabetic Foot Exam Performed and Monofilament Test Performed  Skin:     General: Skin is warm and dry.   Neurological:      Mental Status: She is alert and oriented to person, place, and time.   Psychiatric:         Mood and Affect: Mood normal.         Behavior: Behavior normal.         Thought Content: Thought content normal.         Judgment: Judgment normal.         CMP:  Lab Results   Component Value Date     (H) 07/30/2021    BUN 9 06/06/2022    CREATININE 0.51 (L) 06/06/2022    EGFRIFNONA 104 02/21/2022    EGFRIFAFRI >60 07/30/2021    BCR 18 06/06/2022     06/06/2022    K 4.4 06/06/2022    CO2 24 06/06/2022    CALCIUM 9.6 06/06/2022    PROTENTOTREF 6.6 06/06/2022    ALBUMIN 4.3 06/06/2022    LABGLOBREF 2.3 06/06/2022    LABIL2 1.9 06/06/2022    BILITOT 0.5 06/06/2022    ALKPHOS 91 06/06/2022    AST 16 06/06/2022    ALT 14 06/06/2022     Lipid Panel:  Lab Results   Component Value Date    TRIG 230 (H) 06/29/2021    HDL 56 06/29/2021    VLDL 37 06/29/2021    LDL 68 06/29/2021     HbA1c:  Lab Results   Component Value Date    HGBA1C 9.2 11/14/2022    HGBA1C 9.0 08/09/2022     Glucose:  Lab Results   Component Value Date    POCGLU 235 (A) 11/14/2022     Microalbumin:  No results found for: MALBCRERATIO  TSH:  Lab Results   Component Value Date    TSH 1.450 06/29/2021       Assessment and Plan    Diagnoses and all orders for this visit:    1. Type 1 diabetes mellitus with hyperglycemia (HCC) (Primary)  Assessment & Plan:  -Diabetes remains stable with A1c 9.2.  -Discussed importance of yearly diabetic eye  exams.  -Discussed importance of checking blood sugars regularly.  Using Dexcom CGM.  2-week data download shows the following:  Very high: 63%   high: 16%  In range: 20%  Low: <1%  Very low 0%  Trends shows hyperglycemia with significant postprandial hyperglycemia noted.  She has very wide variable BG's.   -Adjustment to pump settings:  Basal: 0.90 u/hr  CR: 1:9  CF: 1:42  -S/S hypoglycemia reviewed with rule of 15's advised.   -Follow-up in 3 months.    Orders:  -     POC Glucose Fingerstick  -     POC Glycosylated Hemoglobin (Hb A1C)    Other orders  -     insulin lispro (humaLOG) 100 UNIT/ML injection; For use in insulin pump.  MDD: 100  Dispense: 30 mL; Refill: 3       Return in about 3 months (around 2/14/2023) for Follow-up appointment, A1C. The patient was instructed to contact the clinic with any interval questions or concerns.        This document has been electronically signed by SATHISH Fitzpatrick  November 14, 2022 10:58 EST   Endocrinology

## 2022-11-14 NOTE — ASSESSMENT & PLAN NOTE
-Diabetes remains stable with A1c 9.2.  -Discussed importance of yearly diabetic eye exams.  -Discussed importance of checking blood sugars regularly.  Using Dexcom CGM.  2-week data download shows the following:  Very high: 63%   high: 16%  In range: 20%  Low: <1%  Very low 0%  Trends shows hyperglycemia with significant postprandial hyperglycemia noted.  She has very wide variable BG's.   -Adjustment to pump settings:  Basal: 0.90 u/hr  CR: 1:9  CF: 1:42  -S/S hypoglycemia reviewed with rule of 15's advised.   -Follow-up in 3 months.

## 2023-07-13 NOTE — PROGRESS NOTES
"[facility] BARIATRIC SURGERY  [address]  [address]  [facility] [id number] [phone]      Patient  Name:  Rain Barreto  :  1985      Date of Visit: 2022      Chief Complaint:  weight gain; unable to maintain weight loss.         History of Present Illness:  Rain Barreto is a 36 y.o. female pursuing MBS w/ Dr. Finnegan.     From Consult w/ Dr. Finnegan 3/1/22:  \"The patient returns for final visit prior to metabolic and bariatric surgery specifically the sleeve gastrectomy.  Original intake evaluation Kami Diaz PA-C dated 2021 reviewed.    She notes the patient's maximum lifetime weight is 225 pounds and that her  had a lap band with Dr. Lopez in [place] and did well initially but gained back most of his weight and that she has a friend who had a gastric bypass who recommended she come here and that she has a history of type 1 diabetes diagnosed in her 20s, hyperlipidemia allergy related wheezing and dyspnea for which she uses an inhaler as needed and that she is a former smoker and that she has history of dysphagia treated with EGD with dilatation by Dr. Koenig in 2019 with improvement in her symptoms but the have returned to some degree and that she has heartburn treated with Zantac as needed and she always keeps Tums close by and has chronic dyspepsia which is intermittent and triggered by her nerves and certain foods and been attributed to colitis type issues and that she has known diverticulosis on her last colonoscopy and that EGD with Dr. Koenig on 3/21/2019 showed erosive distal esophagitis class a esophageal rings dilated to 18 mm small sliding hiatal hernia less than 3 cm erythematous erosive gastritis and subtle scalloping within the second portion of the duodenum biopsy negative.      The patient has had issues with morbid obesity for years and only temporary success with non-surgical methods of weight loss.  The patient is seeking LSG to " "help with the morbid obesity related conditions of anxiety, type 1 insulin-dependent diabetes mellitus, diverticulosis, dyspepsia, dysphagia, heartburn, chronic cholecystitis without cholelithiasis, hyperlipidemia, nephrolithiasis, migraine headaches, postoperative nausea and vomiting, upper dentures, wheezing, tobacco use disorder in remission.    36-year-old morbidly obese female from [facility].  Her  is with her for today's evaluation.  She does complain of chronic nausea without evidence of gastroparesis on her gastric emptying study and presumably related to gallbladder disease.  She is allergic to hydrocodone but says oxycodone and morphine are okay.  Given her type 1 insulin-dependent diabetes mellitus we did discuss Cate-en-Y gastric bypass versus sleeve gastrectomy and she prefers to proceed with the latter.  Her paternal grandmother had a history of DVTs.\"      ---- No new issues/concerns.  On preop diet.  Avoiding ASA/NSAIDS/steroids/tobacco.        Past Medical History:   Diagnosis Date   • Anxiety    • Depression    • Diabetes mellitus (HCC)     Type I, has detachable pump. dx in 20's   • Diverticulosis    • Dyspepsia    • Gallstones    • Heartburn     takes prn zantac and tums, last EGD 2019 witth dilatation   • HLD (hyperlipidemia)    • Hypertension     denies   • Kidney infection    • Kidney stone    • Migraine    •  (normal spontaneous vaginal delivery)     1st child w/ preeclampsia   • PONV (postoperative nausea and vomiting)    • Tattoos    • Wears dentures     upper   • Wheezing     \"seasonal allergies\" uses inhalers prn     Past Surgical History:   Procedure Laterality Date   •  SECTION     •  SECTION     •  SECTION     • COLONOSCOPY N/A 04/15/2019    Procedure: COLONOSCOPY W/ COLD FORCEP BIOPSIES; COLD SNARE POLYPECTOMY; COLD FORCEP POLYPECTOMY; RESOLUTION CLIP X1;  Surgeon: Adán Pena MD;  Location: [facility] ENDOSCOPY;  Service: " Consent Type: Consent 1 (Standard) Gastroenterology   • D & C HYSTEROSCOPY ENDOMETRIAL ABLATION     • ENDOSCOPY N/A 2019    Procedure: ESOPHAGOGASTRODUODENOSCOPY with cold biopsies and dilation;  Surgeon: Adán Pena MD;  [facility];  Service: Gastroenterology   • TEETH EXTRACTION      dentures on upper plate   • TOTAL ABDOMINAL HYSTERECTOMY  2019    low transverse.  Sperry Harry - lap converted to open for scar tissue   • TUBAL ABDOMINAL LIGATION         Allergies   Allergen Reactions   • Hydrocodone Itching and Rash   • Jardiance [Empagliflozin] Itching and Rash   • Latex Rash   • Trulicity [Dulaglutide] Nausea Only       Current Outpatient Medications:   •  atorvastatin (LIPITOR) 20 MG tablet, Take 20 mg by mouth Daily., Disp: , Rfl:   •  Dapagliflozin Propanediol (Farxiga) 10 MG tablet, Take 10 mg by mouth Daily., Disp: , Rfl:   •  insulin lispro (ADMELOG) 100 UNIT/ML injection, Per Omnipod, Disp: , Rfl:   •  multivitamin with minerals tablet tablet, Take 1 tablet by mouth Daily., Disp: , Rfl:   •  ondansetron (ZOFRAN) 4 MG tablet, Take 4 mg by mouth Every 8 (Eight) Hours As Needed for Nausea., Disp: , Rfl:   •  Probiotic capsule, Take 1 capsule by mouth Daily., Disp: 30 capsule, Rfl: 1  •  albuterol sulfate  (90 Base) MCG/ACT inhaler, Inhale 2 puffs Every 4 (Four) Hours As Needed for Wheezing., Disp: , Rfl:   •  AZO CRANBERRY GUMMIES PO, 2 gummies once daily, Disp: , Rfl:   •  Black Cohosh 20 MG tablet, Take  by mouth Daily., Disp: , Rfl:   •  Continuous Blood Gluc Sensor (Dexcom G6 Sensor), USE TO CHECK BLOOD SUGAR AND CHANGE EVERY 10 DAYS, Disp: , Rfl:   •  naproxen (NAPROSYN) 500 MG tablet, Take 1,000 mg by mouth As Needed for Mild Pain ., Disp: , Rfl:     Social History     Socioeconomic History   • Marital status:    Tobacco Use   • Smoking status: Former Smoker     Packs/day: 0.25     Years: 9.00     Pack years: 2.25     Types: Cigarettes     Quit date:      Years since quittin.3    • Smokeless tobacco: Never Used   Vaping Use   • Vaping Use: Never used   Substance and Sexual Activity   • Alcohol use: Not Currently     Comment: social   • Drug use: No   • Sexual activity: Defer     Family History   Problem Relation Age of Onset   • Colon cancer Paternal Uncle    • Colon cancer Maternal Grandmother    • Crohn's disease Maternal Grandmother    • Diabetes Maternal Grandmother    • Colon cancer Paternal Grandmother    • Other Paternal Grandmother         Colitis   • Diabetes Paternal Grandmother    • Hyperlipidemia Paternal Grandmother    • Hypertension Paternal Grandmother    • Sleep apnea Paternal Grandmother    • Colon cancer Paternal Grandfather    • Diabetes Paternal Grandfather    • Hypertension Paternal Grandfather    • Hypertension Mother    • Cancer Mother    • Diabetes Maternal Grandfather    • Hypertension Maternal Grandfather    • Stroke Maternal Grandfather    • No Known Problems Brother    • No Known Problems Son    • No Known Problems Son    • No Known Problems Daughter    • No Known Problems Daughter        Review of Systems   Constitutional: Positive for fatigue. Negative for chills, diaphoresis, fever and unexpected weight loss.   HENT: Negative for congestion and facial swelling.    Eyes: Negative for blurred vision, double vision and discharge.   Respiratory: Negative for chest tightness, shortness of breath and stridor.    Cardiovascular: Negative for chest pain, palpitations and leg swelling.   Gastrointestinal: Positive for nausea. Negative for blood in stool.   Endocrine: Negative for polydipsia.   Genitourinary: Negative for hematuria.   Skin: Negative for color change.   Allergic/Immunologic: Negative for immunocompromised state.   Neurological: Negative for confusion.   Psychiatric/Behavioral: Negative for self-injury.       ROS reviewed - accurate.     Physical Exam:  Vital Signs:  Weight: 87.3 kg (192 lb 8 oz)   Body mass index is 36.37 kg/m².              From  Consult w/ Dr. Finnegan 3/1/22:   Physical Exam  Vitals reviewed.   Constitutional:       Appearance: She is well-developed.   HENT:      Head: Normocephalic and atraumatic.      Nose:      Comments: mask  Eyes:      Conjunctiva/sclera: Conjunctivae normal.      Pupils: Pupils are equal, round, and reactive to light.   Neck:      Thyroid: No thyromegaly.      Vascular: No carotid bruit.      Trachea: No tracheal deviation.   Cardiovascular:      Rate and Rhythm: Normal rate and regular rhythm.      Heart sounds: Normal heart sounds.      Comments: No murmur appreciated  Pulmonary:      Effort: Pulmonary effort is normal. No respiratory distress.      Breath sounds: Normal breath sounds.   Abdominal:      General: There is no distension.      Palpations: Abdomen is soft.      Tenderness: There is no abdominal tenderness.      Comments: Lower abdominal transverse scar   Musculoskeletal:         General: No deformity. Normal range of motion.      Cervical back: Normal range of motion and neck supple.   Skin:     General: Skin is warm and dry.      Findings: No rash.   Neurological:      Mental Status: She is alert and oriented to person, place, and time.      Cranial Nerves: No cranial nerve deficit.      Coordination: Coordination normal.   Psychiatric:         Behavior: Behavior normal.         Thought Content: Thought content normal.         Judgment: Judgment normal.         Patient Active Problem List   Diagnosis   • Left lower quadrant pain   • Constipation   • Bright red blood per rectum   • Nausea   • Dysphagia   • Heartburn   • Diarrhea   • Colon cancer screening   • HLD (hyperlipidemia)   • Dyspepsia   • Diverticulosis   • Wheezing   • Migraine   • Diabetes mellitus (HCC)   • Anxiety   • Depression   • Hypercholesteremia   • Murmur, cardiac   • Metabolic syndrome   • Pre-operative cardiovascular examination   • Morbid exogenous obesity (HCC)   • Cholecystitis without cholelithiasis       Assessment:  Rain HUERTA  "Mary Lou is a 36 y.o. female with medically complicated obesity.    From Consult w/ Dr. Finnegan 3/1/22:  \"Metabolic and bariatric surgery is deemed medically necessary given the following obesity related comorbidities including anxiety, type 1 insulin-dependent diabetes mellitus, diverticulosis, dyspepsia, dysphagia, heartburn, chronic cholecystitis without cholelithiasis, hyperlipidemia, nephrolithiasis, migraine headaches, postoperative nausea and vomiting, upper dentures, wheezing, tobacco use disorder in remission with current Weight: 87.3 kg (192 lb 8 oz) and Body mass index is 36.37 kg/m²..    Encounter Diagnoses   Name Primary?   • Morbid exogenous obesity (HCC) Yes   • Cholecystitis without cholelithiasis       Patient is aware that surgery is a tool, and that weight loss and improvement in comorbidities is not guaranteed but only seen in the context of appropriate use, follow up and physical activity.    The patient was present for an approximately a 2.5 hour discussion of the purpose of MBS, how MBS is a tool to assist in achieving weight loss goals, the most common complications and how best to avoid them, and the strategies for short and long term weight loss and improvement in comorbidities.  Ample opportunity to discuss questions was available both in group and during the time of individual examination.    I reviewed her Sebastian report showing oxycodone x1.  CCK HIDA scan dated 9/3/2021 read by Dr. Ajit Wilkins showing an ejection fraction of 33%.  Labs dated 2/21/2022 showing an unremarkable CBC of note hemoglobin 14.8 unremarkable CMP except for glucose of 232.  Chest x-ray dated 2/21/2022 no active process.  EKG dated 8/26/2021 normal sinus rhythm with low QRS voltage in the precordial leads illegible initials.  Psychosocial evaluation dated 6/29/2021 Whit Bearden, PhD good candidate.  Dietitian evaluation dated 6/29/2021 Phylicia whitehead RD noting her diet lacks fruits and vegetables is high in English dickey " entries and sides.  EGD dated 7/19/2021 showing a fairly unremarkable examination no visible hiatal hernia minimal if any changes of eosinophilic esophagitis Z-line at 37 cm.  I noted at that time that her  is with her and that she is a type I diabetic and has cervical dysphagia and rarely has heartburn and that she told the staff she was keeps Tums close by for heartburn and her dyspepsia is triggered by certain foods and she had an upper endoscopy with Dr. Manzanares in [place] in March 2019 showing the findings discussed above.  Antrum biopsy showed reactive changes negative for H. pylori distal esophageal biopsies showed reactive changes otherwise unremarkable and mid esophageal biopsies showed reactive changes otherwise unremarkable.  Given the discrepancy I obtained an upper GI dated 7/28/2021 read by Venkatesh Lopez showing no hiatal hernia and no significant reflux.  Normal gastric emptying study dated 7/26/2021 read by Venkatesh Lopez MD.  Negative H. pylori serum testing dated 6/29/2021.  Labs from that day showing an unremarkable CMP except for glucose of 183 hemoglobin A1c 10.0 elevated triglycerides 230 normal TSH normal gallbladder ultrasound dated 7/26/2021 read by Venkatesh Lopez MD unremarkable pulmonary function tests read by Emily Pichardo on 7/31/2021 of note FVC 91 FEV1 93 DLCO 88 echocardiogram dated 9/21/2021 showing ejection fraction of 61 to 65% normal systolic and diastolic function trace to mild mitral valve regurgitation that the patient is cleared to undergo surgery with usual precautions.  Cardiology clearance dated 8/26/2021 Blake chen MD.  He noted a grade 3/6 high frequency blowing holosystolic murmur at the apex.  Please see scanned records that I have reviewed and signed off on today.  All of this in addition to the patient's unique history and exam has been taken into consideration in determining their appropriate candidacy for MBS.    Complications  of laparoscopic/possible  "robotic gastric sleeve were discussed. The patient is well aware of the potential complications of surgery that include but not limited to bleeding, infections, deep venous thrombosis, pulmonary embolism, pulmonary complications such as pneumonia, cardiac events, hernias, small bowel obstruction, damage to the spleen or other organs, bowel injury, disfiguring scars, failure to lose weight, need for additional surgery, conversion to an open procedure, and death. Patient is also aware of complications which apply in this particular procedure that can include but are not limited to a \"leak\" at the staple line which in some instances may require conversion to gastric bypass.    The patient is aware if a hiatal hernia is encountered, it likely will be repaired.  R/B/A Rx to hiatal hernia repair were discussed as outlined in our long consent form.  Briefly risks in addition to those for LSG include recurrent hernia, IMANI, dysphagia, esophageal injury, pneumothorax, injury to the vagus nerves, injury to the thoracic duct, aorta or vena cava.    I discussed avoiding all tobacco products, nicotine,  and second hand smoke at least 2 weeks pre-operatively and 6 weeks post-operatively to minimize the risk of sleeve leak.  This included discussing the importance of avoiding even secondhand smoke as the risk of leak is increased.  Examples discussed:  Avoid going in a house or riding in a car where someone has previously smoked in the last 2 weeks and for 6 weeks postoperatively.  Avoid living in a house where someone smokes (even if it's in a separate room/patio/attached garage, etc.).   Avoid congregating with a group of people who are smoking even if it's outside.  It is OK to be around wood burning fires and barbecue.  I explained that I do not know if marijuana has a same effects but my overall recommendation is to avoid it for 2 weeks prior in 6 weeks after surgery.     Discussed the risks, benefits and alternative therapies " "at great length as outlined in our extensive consent forms, consent videos, and educational teaching process under the direction of the center's .    A copy of the patient's signed informed consent is on file.    R/B/A Rx discussed to postop anticoagulation incl but not limited to bleeding, drug reaction, venothromboembolic events, etc. and the patient declined.  Even though her paternal grandmother had a history of DVTs I think this is not unreasonable given her BMI and lack of any venous thrombolic events with her previous surgeries or pregnancies.    R/b/a rx discussed including but not limited to bleeding, infection, bile leak, bile duct injury, bowel injury, pulm complications, venothromboembolic events, death etc and wishes to proceed with concomitant lap ella.  Aware may not alleviate symptoms and further work up may be warranted.      Plan: After evaluation today I think the patient is a reasonable candidate for laparoscopic sleeve gastrectomy, cholecystectomy, and EGD.  Hold anti-inflammatories 1 week prior in 6 weeks after surgery.  Once again not interested in Cate-en-Y gastric bypass at this time.  Other issues include anxiety, longstanding poorly controlled type 1 insulin-dependent diabetes mellitus, diverticulosis, dyspepsia, dysphagia, heartburn, chronic cholecystitis without cholelithiasis, hyperlipidemia, nephrolithiasis, migraine headaches, postoperative nausea and vomiting, upper dentures, wheezing, tobacco use disorder in remission.\"    --- As above, will proceed w/ laparoscopic sleeve gastrectomy, cholecystectomy, and EGD @[facility] w/ Dr. Finnegan.  Preop instructions reviewed.  All questions answered.  Call w/ problems/concerns.       Note: This was an audio and video enabled telemedicine encounter conducted via [employer] during the COVID-19 pandemic.  Patient is located in [place].  Provider is at her office.  Consent was obtained prior to the visit.            "

## 2023-08-03 RX ORDER — OMEPRAZOLE 40 MG/1
40 CAPSULE, DELAYED RELEASE ORAL DAILY
Qty: 30 CAPSULE | Refills: 0 | Status: SHIPPED | OUTPATIENT
Start: 2023-08-03

## 2023-08-21 ENCOUNTER — OFFICE VISIT (OUTPATIENT)
Dept: BARIATRICS/WEIGHT MGMT | Facility: CLINIC | Age: 38
End: 2023-08-21
Payer: COMMERCIAL

## 2023-08-21 VITALS
BODY MASS INDEX: 36.72 KG/M2 | DIASTOLIC BLOOD PRESSURE: 60 MMHG | SYSTOLIC BLOOD PRESSURE: 120 MMHG | TEMPERATURE: 97.3 F | WEIGHT: 194.5 LBS | OXYGEN SATURATION: 98 % | HEIGHT: 61 IN | HEART RATE: 86 BPM

## 2023-08-21 DIAGNOSIS — E66.9 OBESITY, CLASS II, BMI 35-39.9: Primary | ICD-10-CM

## 2023-08-21 DIAGNOSIS — K21.9 GASTROESOPHAGEAL REFLUX DISEASE, UNSPECIFIED WHETHER ESOPHAGITIS PRESENT: ICD-10-CM

## 2023-08-21 PROCEDURE — 1159F MED LIST DOCD IN RCRD: CPT | Performed by: PHYSICIAN ASSISTANT

## 2023-08-21 PROCEDURE — 1160F RVW MEDS BY RX/DR IN RCRD: CPT | Performed by: PHYSICIAN ASSISTANT

## 2023-08-21 PROCEDURE — 99214 OFFICE O/P EST MOD 30 MIN: CPT | Performed by: PHYSICIAN ASSISTANT

## 2023-08-21 RX ORDER — ERGOCALCIFEROL 1.25 MG/1
50000 CAPSULE ORAL
Qty: 12 CAPSULE | Refills: 0 | Status: SHIPPED | OUTPATIENT
Start: 2023-08-21 | End: 2023-11-07

## 2023-08-21 RX ORDER — OMEPRAZOLE 40 MG/1
40 CAPSULE, DELAYED RELEASE ORAL 2 TIMES DAILY
Qty: 180 CAPSULE | Refills: 1 | Status: SHIPPED | OUTPATIENT
Start: 2023-08-21

## 2023-08-21 RX ORDER — FAMOTIDINE 40 MG/1
40 TABLET, FILM COATED ORAL 2 TIMES DAILY
Qty: 180 TABLET | Refills: 1 | Status: SHIPPED | OUTPATIENT
Start: 2023-08-21
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
## 2023-11-30 RX ORDER — FAMOTIDINE 40 MG/1
40 TABLET, FILM COATED ORAL 2 TIMES DAILY
Qty: 180 TABLET | Refills: 0 | Status: SHIPPED | OUTPATIENT
Start: 2023-11-30

## 2023-12-12 ENCOUNTER — OFFICE VISIT (OUTPATIENT)
Dept: ENDOCRINOLOGY | Facility: CLINIC | Age: 38
End: 2023-12-12
Payer: COMMERCIAL

## 2023-12-12 ENCOUNTER — SPECIALTY PHARMACY (OUTPATIENT)
Dept: PHARMACY | Facility: HOSPITAL | Age: 38
End: 2023-12-12
Payer: COMMERCIAL

## 2023-12-12 VITALS
HEART RATE: 85 BPM | OXYGEN SATURATION: 97 % | SYSTOLIC BLOOD PRESSURE: 126 MMHG | WEIGHT: 196 LBS | BODY MASS INDEX: 37 KG/M2 | HEIGHT: 61 IN | DIASTOLIC BLOOD PRESSURE: 62 MMHG

## 2023-12-12 DIAGNOSIS — E10.65 TYPE 1 DIABETES MELLITUS WITH HYPERGLYCEMIA: Primary | ICD-10-CM

## 2023-12-12 RX ORDER — LIDOCAINE 50 MG/G
1 PATCH TOPICAL
COMMUNITY
Start: 2023-11-12

## 2023-12-12 RX ORDER — BUSPIRONE HYDROCHLORIDE 10 MG/1
10 TABLET ORAL EVERY 12 HOURS SCHEDULED
COMMUNITY
Start: 2023-08-25

## 2023-12-12 NOTE — ASSESSMENT & PLAN NOTE
-Diabetes is above goal with BG's above goal.  A1c from PCP pending.  -Discussed importance of yearly diabetic eye exams.  -Discussed importance of checking blood sugars regularly.  Using Dexcom CGM.  2-week data download shows the following:  Very high: 57%   high: 24%  In range: 18%  Low: <1%  Very low: <1  Trends shows hyperglycemia with significant postprandial hyperglycemia noted.  She has very wide variable BG's.   -Adjustment to pump settings:  Basal:  12A: 1 u/hr  6A: 2.15 u/hr  11A: 2 u/hr  6P: 2.15 u/hr  CR: 1:9  CF: 1:42  -S/S hypoglycemia reviewed with rule of 15's advised.   -Follow-up in 3 months.
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
## 2024-04-05 ENCOUNTER — OFFICE VISIT (OUTPATIENT)
Dept: ENDOCRINOLOGY | Facility: CLINIC | Age: 39
End: 2024-04-05
Payer: COMMERCIAL

## 2024-04-05 VITALS
WEIGHT: 205.6 LBS | OXYGEN SATURATION: 98 % | DIASTOLIC BLOOD PRESSURE: 85 MMHG | BODY MASS INDEX: 38.85 KG/M2 | SYSTOLIC BLOOD PRESSURE: 152 MMHG | HEART RATE: 92 BPM

## 2024-04-05 DIAGNOSIS — E10.65 TYPE 1 DIABETES MELLITUS WITH HYPERGLYCEMIA: Primary | ICD-10-CM

## 2024-04-05 LAB
EXPIRATION DATE: ABNORMAL
GLUCOSE BLDC GLUCOMTR-MCNC: 267 MG/DL (ref 70–130)
Lab: ABNORMAL

## 2024-04-05 PROCEDURE — 99214 OFFICE O/P EST MOD 30 MIN: CPT | Performed by: NURSE PRACTITIONER

## 2024-04-05 PROCEDURE — 95251 CONT GLUC MNTR ANALYSIS I&R: CPT | Performed by: NURSE PRACTITIONER

## 2024-04-05 PROCEDURE — 1159F MED LIST DOCD IN RCRD: CPT | Performed by: NURSE PRACTITIONER

## 2024-04-05 PROCEDURE — 1160F RVW MEDS BY RX/DR IN RCRD: CPT | Performed by: NURSE PRACTITIONER

## 2024-04-05 RX ORDER — RIMEGEPANT SULFATE 75 MG/75MG
TABLET, ORALLY DISINTEGRATING ORAL
COMMUNITY

## 2024-04-05 RX ORDER — DULOXETIN HYDROCHLORIDE 60 MG/1
60 CAPSULE, DELAYED RELEASE ORAL DAILY
COMMUNITY
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
## 2024-04-11 ENCOUNTER — TELEPHONE (OUTPATIENT)
Dept: ENDOCRINOLOGY | Facility: CLINIC | Age: 39
End: 2024-04-11

## 2024-04-11 RX ORDER — INSULIN LISPRO 100 [IU]/ML
INJECTION, SOLUTION INTRAVENOUS; SUBCUTANEOUS
Qty: 30 ML | Refills: 5 | Status: SHIPPED | OUTPATIENT
Start: 2024-04-11

## 2024-04-11 RX ORDER — INSULIN PMP CART,AUT,G6/7,CNTR
1 EACH SUBCUTANEOUS
Qty: 10 EACH | Refills: 2 | Status: SHIPPED | OUTPATIENT
Start: 2024-04-11

## 2024-04-11 RX ORDER — PROCHLORPERAZINE 25 MG/1
SUPPOSITORY RECTAL
Qty: 3 EACH | Refills: 11 | Status: SHIPPED | OUTPATIENT
Start: 2024-04-11

## 2024-04-11 RX ORDER — PROCHLORPERAZINE 25 MG/1
1 SUPPOSITORY RECTAL
Qty: 1 EACH | Refills: 2 | Status: SHIPPED | OUTPATIENT
Start: 2024-04-11

## 2024-04-12 NOTE — ASSESSMENT & PLAN NOTE
-Diabetes is improving with BGs improving.  -Discussed importance of yearly diabetic eye exams.  -Discussed importance of checking blood sugars regularly.  Using Dexcom CGM.  2-week data download shows the following:  Very high: 39%   high: 31%  In range: 30%  Low: 0%  Very low: 0%  Trends shows hyperglycemia with significant postprandial hyperglycemia noted.  She has very wide variable BG's.   -Adjustment to pump settings:  Basal:  12A: 1 u/hr  6A: 2.15 u/hr  11A: 2 u/hr  6P: 2.30 u/hr  CR: 1:9  CF: 1:42  -S/S hypoglycemia reviewed with rule of 15's advised.   -Follow-up in 3 months.

## 2024-12-03 RX ORDER — INSULIN LISPRO 100 [IU]/ML
INJECTION, SOLUTION INTRAVENOUS; SUBCUTANEOUS
Qty: 30 ML | Refills: 5 | Status: SHIPPED | OUTPATIENT
Start: 2024-12-03

## 2024-12-18 ENCOUNTER — OFFICE VISIT (OUTPATIENT)
Dept: ENDOCRINOLOGY | Facility: CLINIC | Age: 39
End: 2024-12-18
Payer: MEDICAID

## 2024-12-18 VITALS
OXYGEN SATURATION: 98 % | BODY MASS INDEX: 32.88 KG/M2 | WEIGHT: 174 LBS | HEART RATE: 84 BPM | SYSTOLIC BLOOD PRESSURE: 124 MMHG | DIASTOLIC BLOOD PRESSURE: 79 MMHG

## 2024-12-18 DIAGNOSIS — E10.65 TYPE 1 DIABETES MELLITUS WITH HYPERGLYCEMIA: Primary | ICD-10-CM

## 2024-12-18 LAB
ALBUMIN SERPL-MCNC: 4.4 G/DL (ref 3.5–5.2)
ALBUMIN/GLOB SERPL: 1.9 G/DL
ALP SERPL-CCNC: 102 U/L (ref 39–117)
ALT SERPL W P-5'-P-CCNC: 10 U/L (ref 1–33)
ANION GAP SERPL CALCULATED.3IONS-SCNC: 12.3 MMOL/L (ref 5–15)
AST SERPL-CCNC: 12 U/L (ref 1–32)
BILIRUB SERPL-MCNC: 0.5 MG/DL (ref 0–1.2)
BUN SERPL-MCNC: 8 MG/DL (ref 6–20)
BUN/CREAT SERPL: 11.6 (ref 7–25)
CALCIUM SPEC-SCNC: 9.3 MG/DL (ref 8.6–10.5)
CHLORIDE SERPL-SCNC: 99 MMOL/L (ref 98–107)
CHOLEST SERPL-MCNC: 175 MG/DL (ref 0–200)
CO2 SERPL-SCNC: 28.7 MMOL/L (ref 22–29)
CREAT SERPL-MCNC: 0.69 MG/DL (ref 0.57–1)
EGFRCR SERPLBLD CKD-EPI 2021: 112 ML/MIN/1.73
EXPIRATION DATE: ABNORMAL
GLOBULIN UR ELPH-MCNC: 2.3 GM/DL
GLUCOSE SERPL-MCNC: 236 MG/DL (ref 65–99)
HBA1C MFR BLD: 9.3 % (ref 4.5–5.7)
HDLC SERPL-MCNC: 51 MG/DL (ref 40–60)
LDLC SERPL CALC-MCNC: 101 MG/DL (ref 0–100)
LDLC/HDLC SERPL: 1.93 {RATIO}
Lab: ABNORMAL
POTASSIUM SERPL-SCNC: 4.4 MMOL/L (ref 3.5–5.2)
PROT SERPL-MCNC: 6.7 G/DL (ref 6–8.5)
SODIUM SERPL-SCNC: 140 MMOL/L (ref 136–145)
T4 FREE SERPL-MCNC: 1.08 NG/DL (ref 0.92–1.68)
TRIGL SERPL-MCNC: 128 MG/DL (ref 0–150)
TSH SERPL DL<=0.05 MIU/L-ACNC: 1.48 UIU/ML (ref 0.27–4.2)
VLDLC SERPL-MCNC: 23 MG/DL (ref 5–40)

## 2024-12-18 PROCEDURE — 84439 ASSAY OF FREE THYROXINE: CPT | Performed by: NURSE PRACTITIONER

## 2024-12-18 PROCEDURE — 84443 ASSAY THYROID STIM HORMONE: CPT | Performed by: NURSE PRACTITIONER

## 2024-12-18 PROCEDURE — 80053 COMPREHEN METABOLIC PANEL: CPT | Performed by: NURSE PRACTITIONER

## 2024-12-18 PROCEDURE — 80061 LIPID PANEL: CPT | Performed by: NURSE PRACTITIONER

## 2024-12-18 RX ORDER — ACYCLOVIR 400 MG/1
1 TABLET ORAL TAKE AS DIRECTED
Qty: 1 EACH | Refills: 0 | Status: SHIPPED | OUTPATIENT
Start: 2024-12-18

## 2024-12-18 RX ORDER — ACYCLOVIR 400 MG/1
1 TABLET ORAL
Qty: 3 EACH | Refills: 5 | Status: SHIPPED | OUTPATIENT
Start: 2024-12-18

## 2024-12-18 RX ORDER — FEZOLINETANT 45 MG/1
45 TABLET, FILM COATED ORAL EVERY 24 HOURS
COMMUNITY

## 2024-12-18 NOTE — ASSESSMENT & PLAN NOTE
-Diabetes is above goal with A1c 9.3.  -Discussed importance of yearly diabetic eye exams.  -Discussed importance of checking blood sugars regularly.  Using Dexcom CGM.  2-week data download shows the following:  Very high: 17%   high: 34%  In range: 49%  Low: 0%  Very low: 0%  Trends shows hyperglycemia with postprandial hyperglycemia noted.    -Adjustment to pump settings:  Basal:  12A: 1 u/hr  6A: 1.3 u/hr  11A: 1.3 u/hr  6P: 1 u/hr  CR: 1:9  CF: 1:42  BG Correction Threshold: 110  BG Target Range: 110  -Discussed use of Glucagon and appropriate timing.  -S/S hypoglycemia reviewed with rule of 15's advised.   -Follow-up in 3 months.

## 2024-12-18 NOTE — PROGRESS NOTES
"  Rain Barreto is a 39 y.o. female had concerns including Follow-up (T1DM).    T1DM.    Patient reports that since her last visit, she is doing better.  She was having overnight hypos and reduced her basal settings. But reports that she now is having hypers more often.  She is doing well otherwise.    Diabetes:  Diabetes was diagnosed .  Complications include neuropathy.  Last ophtho exam was 2022, [place] [facility].  Current medications for diabetes include Humalog in an Omnipod 5 insulin pump.  She checks her blood sugar dexcom 288 times per day.   Hypos: rarely    Current pump settings:  Basal:   12A: 1 u/hr  6A: 1 u/hr  11A: 1 u/hr  6P: 1 u/hr  CR: 1:9  CF: 1:42    The following portions of the patient's history were reviewed and updated as appropriate: allergies, current medications, past family history, past medical history, past social history, past surgical history and problem list.    Diet: She is very limited due to her recent gastric bypass surgery.    Past Medical History:   Diagnosis Date    Anxiety     Depression     Diabetes mellitus     Type I, has detachable pump. dx in 's    Diabetes mellitus type I     Diverticulosis     Dyspepsia     Gallstones     Gestational diabetes     Heartburn     takes prn zantac and tums, EGD  [facility], EGD Dr. Finnegan 19    HLD (hyperlipidemia)     Hypertension     denies    Kidney infection     Kidney stone     Migraine     MRSA carrier     PAT (+) 22     (normal spontaneous vaginal delivery)     1st child w/ preeclampsia    PONV (postoperative nausea and vomiting)     Tattoos     Wears dentures     upper    Wheezing     \"seasonal allergies\" uses inhalers prn     Past Surgical History:   Procedure Laterality Date     SECTION       SECTION       SECTION      CHOLECYSTECTOMY N/A 2022    Procedure: CHOLECYSTECTOMY LAPAROSCOPIC;  Surgeon: Yunior Finnegan MD;  Location:  [facility];  " Service: General;  Laterality: N/A;    COLONOSCOPY N/A 04/15/2019    Procedure: COLONOSCOPY W/ COLD FORCEP BIOPSIES; COLD SNARE POLYPECTOMY; COLD FORCEP POLYPECTOMY; RESOLUTION CLIP X1;  Surgeon: Adán Pena MD;  Location: [facility] ENDOSCOPY;  Service: Gastroenterology    D & C HYSTEROSCOPY ENDOMETRIAL ABLATION  2015    ENDOSCOPY N/A 03/21/2019    Procedure: ESOPHAGOGASTRODUODENOSCOPY with cold biopsies and dilation;  Surgeon: Adán Pena MD;  Location: [facility] ENDOSCOPY;  Service: Gastroenterology    ENDOSCOPY N/A 04/28/2022    Procedure: ESOPHAGOGASTRODUODENOSCOPY;  Surgeon: Yunior Finnegan MD;  Location:  [facility];  Service: Bariatric;  Laterality: N/A;    GASTRIC SLEEVE LAPAROSCOPIC N/A 04/28/2022    Procedure: GASTRIC SLEEVE LAPAROSCOPIC;  Surgeon: Yunior Finnegan MD;  Location:  [facility];  Service: Bariatric;  Laterality: N/A;    TEETH EXTRACTION      dentures on upper plate    TOTAL ABDOMINAL HYSTERECTOMY  2019    low transverse.  [facility] converted to open for scar tissue    TUBAL ABDOMINAL LIGATION  2004      Family History   Problem Relation Age of Onset    Colon cancer Paternal Uncle     Colon cancer Maternal Grandmother     Crohn's disease Maternal Grandmother     Diabetes Maternal Grandmother     Colon cancer Paternal Grandmother     Other Paternal Grandmother         Colitis    Diabetes Paternal Grandmother     Hyperlipidemia Paternal Grandmother     Hypertension Paternal Grandmother     Sleep apnea Paternal Grandmother     Colon cancer Paternal Grandfather     Diabetes Paternal Grandfather     Hypertension Paternal Grandfather     Hypertension Mother     Cancer Mother     Diabetes Maternal Grandfather     Hypertension Maternal Grandfather     Stroke Maternal Grandfather     No Known Problems Brother     No Known Problems Son     No Known Problems Son     No Known Problems Daughter     No Known Problems Daughter       Social History     Socioeconomic History    Marital  status:    Tobacco Use    Smoking status: Former     Current packs/day: 0.00     Average packs/day: 0.3 packs/day for 9.0 years (2.3 ttl pk-yrs)     Types: Cigarettes     Start date: 2002     Quit date: 2011     Years since quittin.9    Smokeless tobacco: Never   Vaping Use    Vaping status: Never Used   Substance and Sexual Activity    Alcohol use: Not Currently     Comment: social    Drug use: No    Sexual activity: Defer      Allergies   Allergen Reactions    Aspirin Unknown - High Severity     Please avoid all IV and oral NSAIDs until after 2022 for high risk of life-threatening delayed gastric leak.  Please contact Bariatric Surgery service if there are questions.    Morphine Itching    Prednisone Other (See Comments)     Please avoid all IV, intraarticular, intramuscular, and oral steroids until after 2022 for high risk of life-threatening delayed gastric leak.  Please contact Bariatric Surgery service if there are questions.    Toradol [Ketorolac Tromethamine] Other (See Comments)     Please avoid all IV and oral NSAIDs until after 2022 for high risk of life-threatening delayed gastric leak.  Please contact Bariatric Surgery service if there are questions.    Adhesive Tape Dermatitis     Luisa. Tele electrodes    Hydrocodone Itching and Rash    Jardiance [Empagliflozin] Itching and Rash    Latex Rash    Trulicity [Dulaglutide] Nausea Only      Current Outpatient Medications on File Prior to Visit   Medication Sig Dispense Refill    ascorbic acid (VITAMIN C) 1000 MG tablet Take 1 tablet by mouth Daily.      busPIRone (BUSPAR) 10 MG tablet Take 1 tablet by mouth Every 12 (Twelve) Hours.      Continuous Blood Gluc Transmit (Dexcom G6 Transmitter) misc 1 each Every 3 (Three) Months. 1 each 3    Continuous Blood Gluc Transmit (Dexcom G6 Transmitter) misc 1 each by Other route Every 3 (Three) Months. 1 each 2    Diclofenac Sodium (VOLTAREN) 1 % gel gel Apply 4 g topically  to the appropriate area as directed 2 (Two) Times a Day.      DULoxetine (CYMBALTA) 60 MG capsule Take 1 capsule by mouth Daily.      famotidine (PEPCID) 40 MG tablet TAKE 1 TABLET BY MOUTH TWICE DAILY 180 tablet 0    Fezolinetant (Veozah) 45 MG tablet Take 1 tablet by mouth Daily.      Gvoke HypoPen 2-Pack 1 MG/0.2ML solution auto-injector       insulin detemir (LEVEMIR) 100 UNIT/ML injection Inject 14 Units under the skin into the appropriate area as directed 2 (Two) Times a Day. 15 mL 2    Insulin Disposable Pump (Omnipod 5 G6 Pods, Gen 5,) misc Use 1 each Every 3 (Three) Days. 10 each 2    Insulin Lispro (humaLOG) 100 UNIT/ML injection For use in insulin pump.   30 mL 5    Insulin Pen Needle (B-D UF III MINI PEN NEEDLES) 31G X 5 MM misc Use as directed four times daily. 200 each 3    lidocaine (LIDODERM) 5 % Place 1 patch on the skin as directed by provider.      methocarbamol (ROBAXIN) 750 MG tablet Take 1 tablet by mouth 4 (Four) Times a Day As Needed for Muscle Spasms.      Multiple Vitamins-Minerals (Hair Skin Nails) capsule Take 1 capsule by mouth Daily.      omeprazole (priLOSEC) 40 MG capsule Take 1 capsule by mouth 2 (Two) Times a Day. 180 capsule 1    oxyCODONE (ROXICODONE) 5 MG immediate release tablet Take 1 tablet by mouth Daily.      Pregabalin (LYRICA PO) Take  by mouth 3 times a day.      Rimegepant Sulfate (Nurtec) 75 MG tablet dispersible tablet Take  by mouth.      VITAMIN D PO Take 1 capsule by mouth Daily.      [DISCONTINUED] Continuous Blood Gluc Sensor (Dexcom G6 Sensor) Use Every 10 (Ten) Days. 3 each 11     No current facility-administered medications on file prior to visit.        Review of Systems   Constitutional:  Positive for fatigue. Negative for unexpected weight gain and unexpected weight loss.   Eyes: Negative.    Endocrine: Negative for polydipsia, polyphagia and polyuria.   Psychiatric/Behavioral:  Negative for sleep disturbance.    All other systems reviewed and are  negative.    /79 (BP Location: Right arm, Patient Position: Sitting, Cuff Size: Adult)   Pulse 84   Wt 78.9 kg (174 lb)   LMP  (LMP Unknown)   SpO2 98%   BMI 32.88 kg/m²      Physical Exam  Vitals reviewed.   Constitutional:       Appearance: Normal appearance.   Eyes:      Extraocular Movements: Extraocular movements intact.   Cardiovascular:      Rate and Rhythm: Normal rate.   Pulmonary:      Effort: Pulmonary effort is normal.   Skin:     General: Skin is warm and dry.   Neurological:      General: No focal deficit present.      Mental Status: She is alert and oriented to person, place, and time.   Psychiatric:         Mood and Affect: Mood normal.         Behavior: Behavior normal.         Thought Content: Thought content normal.         Judgment: Judgment normal.         CMP:  Lab Results   Component Value Date    Glucose 267 (A) 04/05/2024    Glucose 287 (H) 02/15/2023    Glucose 500 (A) 07/30/2021    Glucose 142 (H) 07/30/2021    Glucose, UA >=1000 mg/dL (3+) (A) 05/09/2022    BUN 6 06/26/2023    BUN 4 (L) 05/12/2022    BUN 8 07/30/2021    BUN/Creatinine Ratio 11 06/26/2023    BUN/Creatinine Ratio 8.5 05/12/2022    BUN/Creatinine Ratio 17 07/30/2021    Creatinine 0.57 06/26/2023    Creatinine 0.47 (L) 05/12/2022    Creatinine 0.46 (L) 07/30/2021    Creatinine, Arterial 0.5 04/15/2016    Ketones, UA >=160 mg/dL (4+) (A) 05/09/2022    CO2 25.0 05/12/2022    Total CO2 24 06/26/2023    CO2 Content 8.3 (L) 05/09/2022    Calcium 9.4 06/26/2023    Calcium 9.3 05/12/2022    Calcium 8.6 (L) 07/30/2021    Albumin 4.0 06/26/2023    Albumin 3.60 05/11/2022    AST (SGOT) 18 06/26/2023    AST (SGOT) 10 05/11/2022    ALT (SGPT) 17 06/26/2023    ALT (SGPT) 17 05/11/2022     Lipid Panel:  Lab Results   Component Value Date    TRIG 230 (H) 06/29/2021    HDL 56 06/29/2021    VLDL 37 06/29/2021    LDL 68 06/29/2021     HbA1c:  Lab Results   Component Value Date    HGBA1C 9.3 (A) 12/18/2024     Glucose:  Lab Results  "  Component Value Date    POCGLU 267 (A) 04/05/2024     Microalbumin:  No results found for: \"MALBCRERATIO\"  TSH:  Lab Results   Component Value Date    TSH 1.090 06/26/2023       Assessment and Plan    Diagnoses and all orders for this visit:    1. Type 1 diabetes mellitus with hyperglycemia (Primary)  Assessment & Plan:  -Diabetes is above goal with A1c 9.3.  -Discussed importance of yearly diabetic eye exams.  -Discussed importance of checking blood sugars regularly.  Using Dexcom CGM.  2-week data download shows the following:  Very high: 17%   high: 34%  In range: 49%  Low: 0%  Very low: 0%  Trends shows hyperglycemia with postprandial hyperglycemia noted.    -Adjustment to pump settings:  Basal:  12A: 1 u/hr  6A: 1.3 u/hr  11A: 1.3 u/hr  6P: 1 u/hr  CR: 1:9  CF: 1:42  BG Correction Threshold: 110  BG Target Range: 110  -Discussed use of Glucagon and appropriate timing.  -S/S hypoglycemia reviewed with rule of 15's advised.   -Follow-up in 3 months.    Orders:  -     POC Glycosylated Hemoglobin (Hb A1C)  -     Comprehensive Metabolic Panel  -     TSH  -     T4, free  -     Lipid Panel    Other orders  -     Continuous Glucose  (Dexcom G7 ) device; Use 1 each Take As Directed.  Dispense: 1 each; Refill: 0  -     Continuous Glucose Sensor (Dexcom G7 Sensor) misc; Use 1 each Every 10 (Ten) Days.  Dispense: 3 each; Refill: 5           Return in about 3 months (around 3/18/2025) for Follow-up appointment, A1C. The patient was instructed to contact the clinic with any interval questions or concerns.        This document has been electronically signed by SATHISH Fitzpatrick  December 18, 2024 13:58 EST   Endocrinology    Please note that portions of this document were completed with a voice recognition program. Efforts were made to edit the dictations, but occasionally words are mis-transcribed.  "

## 2025-03-14 ENCOUNTER — TELEPHONE (OUTPATIENT)
Dept: ENDOCRINOLOGY | Facility: CLINIC | Age: 40
End: 2025-03-14
Payer: MEDICAID

## 2025-03-14 RX ORDER — INSULIN PMP CART,AUT,G6/7,CNTR
1 EACH SUBCUTANEOUS EVERY OTHER DAY
Qty: 15 EACH | Refills: 3 | Status: SHIPPED | OUTPATIENT
Start: 2025-03-14

## 2025-03-14 NOTE — TELEPHONE ENCOUNTER
PATIENT IS USING DEXCOM G7 AND IS HAVING ISSUES WITH PHARMACY GETTING THE CORRECT OMNI PODS. THEY ARE GIVING HER OMNI POD 6 AS SHE NEEDS OMNI POD 7. SHE NEEDS US TO SEND PRESCRIPTION INTO PHARMACY FOR OMNI POD 7. PATIENTS NUMBER [phone]

## 2025-03-17 NOTE — TELEPHONE ENCOUNTER
Spoke with pt, Omnipods are out of stock at her pharmacy.She is going to check again today to see if she is able to get them.

## 2025-04-07 ENCOUNTER — OFFICE VISIT (OUTPATIENT)
Dept: ENDOCRINOLOGY | Facility: CLINIC | Age: 40
End: 2025-04-07
Payer: MEDICAID

## 2025-04-07 VITALS
DIASTOLIC BLOOD PRESSURE: 78 MMHG | SYSTOLIC BLOOD PRESSURE: 121 MMHG | OXYGEN SATURATION: 97 % | WEIGHT: 174.6 LBS | HEART RATE: 91 BPM | BODY MASS INDEX: 32.99 KG/M2

## 2025-04-07 DIAGNOSIS — E10.65 TYPE 1 DIABETES MELLITUS WITH HYPERGLYCEMIA: Primary | ICD-10-CM

## 2025-04-07 LAB
EXPIRATION DATE: ABNORMAL
GLUCOSE P FAST SERPL-MCNC: 232 MG/DL (ref 74–106)
HBA1C MFR BLD: 9.5 % (ref 4.5–5.7)
Lab: ABNORMAL

## 2025-04-07 PROCEDURE — 86341 ISLET CELL ANTIBODY: CPT | Performed by: NURSE PRACTITIONER

## 2025-04-07 PROCEDURE — 84681 ASSAY OF C-PEPTIDE: CPT | Performed by: NURSE PRACTITIONER

## 2025-04-07 PROCEDURE — 82947 ASSAY GLUCOSE BLOOD QUANT: CPT | Performed by: NURSE PRACTITIONER

## 2025-04-07 RX ORDER — LINACLOTIDE 145 UG/1
145 CAPSULE, GELATIN COATED ORAL
COMMUNITY

## 2025-04-07 RX ORDER — INSULIN ASPART 100 [IU]/ML
3 INJECTION, SOLUTION INTRAVENOUS; SUBCUTANEOUS
COMMUNITY

## 2025-04-07 RX ORDER — GUAIFENESIN 600 MG/1
1 TABLET, EXTENDED RELEASE ORAL EVERY 12 HOURS
COMMUNITY

## 2025-04-07 RX ORDER — ONDANSETRON 4 MG/1
1 TABLET, FILM COATED ORAL EVERY 6 HOURS PRN
COMMUNITY

## 2025-04-07 RX ORDER — FOLIC ACID 1 MG/1
1 TABLET ORAL DAILY
COMMUNITY

## 2025-04-07 RX ORDER — ESTRADIOL 1 MG/1
1 TABLET ORAL DAILY
COMMUNITY

## 2025-04-07 RX ORDER — LORATADINE 10 MG/1
10 TABLET ORAL DAILY
COMMUNITY

## 2025-04-07 RX ORDER — CYCLOBENZAPRINE HCL 10 MG
10 TABLET ORAL 2 TIMES DAILY PRN
COMMUNITY

## 2025-04-07 RX ORDER — HYDROCHLOROTHIAZIDE 12.5 MG/1
12.5 TABLET ORAL DAILY
COMMUNITY

## 2025-04-07 RX ORDER — GABAPENTIN 400 MG/1
400 CAPSULE ORAL 3 TIMES DAILY
COMMUNITY

## 2025-04-07 NOTE — ASSESSMENT & PLAN NOTE
-Diabetes is above goal with A1c 9.5.  -Discussed importance of yearly diabetic eye exams.  -Discussed importance of checking blood sugars regularly.  Using Dexcom CGM.  2-week data download shows the following:  Very high: 55%   high: 22%  In range: 23%  Low: 0%  Very low: 0%  Trends shows hyperglycemia with postprandial hyperglycemia noted.    -After reviewing patient's insulin pump settings, it appears that she is on the wrong basal setting.  Will have her deactivate basal to settings which are very low, and reactivate basal 1 settings which are her more accurate settings.  -We discussed use of another insulin pump to help better control her blood sugars.  She and has been discussed and they are agreeable and would like to try to use the beta islet insulin pump.  Will send information and fill out forms to send to rep to have this process started.  -Discussed use of Glucagon and appropriate timing.  -S/S hypoglycemia reviewed with rule of 15's advised.   -Follow-up in 3 months.

## 2025-04-07 NOTE — PROGRESS NOTES
"  Rain Barreto is a 39 y.o. female had concerns including Follow-up (Type 1 diabetes mellitus with hyperglycemia).    T1DM.    Patient reports that since her last visit, she is doing better.  She was having normal blood sugars.  She reports that her OmniPod has not been controlling her blood sugars well.  She is having to switch it back to automated mode frequently has been changing into manual mode consistently.  She is having to eat more due to fluctuations with hyper and hypoglycemia often.    Diabetes:  Diabetes was diagnosed .  Complications include neuropathy.  Last ophtho exam was 2022, [place] at [facility].  Current medications for diabetes include Humalog in an Omnipod 5 insulin pump.  She checks her blood sugar dexcom 288 times per day.   Hypos: rarely    The following portions of the patient's history were reviewed and updated as appropriate: allergies, current medications, past family history, past medical history, past social history, past surgical history and problem list.    Diet: She is very limited due to her recent gastric bypass surgery.    Past Medical History:   Diagnosis Date    Anxiety     Depression     Diabetes mellitus     Type I, has detachable pump. dx in 's    Diabetes mellitus type I     Diverticulosis     Dyspepsia     Gallstones     Gestational diabetes     Heartburn     takes prn zantac and tums, EGD  wit dilatation, EGD Dr. Finnegan 19    HLD (hyperlipidemia)     Hypertension     denies    Kidney infection     Kidney stone     Migraine     MRSA carrier     PAT (+) 22     (normal spontaneous vaginal delivery)     1st child w/ preeclampsia    PONV (postoperative nausea and vomiting)     Tattoos     Wears dentures     upper    Wheezing     \"seasonal allergies\" uses inhalers prn     Past Surgical History:   Procedure Laterality Date     SECTION       SECTION       SECTION  2004    CHOLECYSTECTOMY N/A 2022    " Procedure: CHOLECYSTECTOMY LAPAROSCOPIC;  Surgeon: Yunior Finnegan MD;  Location:  [facility];  Service: General;  Laterality: N/A;    COLONOSCOPY N/A 04/15/2019    Procedure: COLONOSCOPY W/ COLD FORCEP BIOPSIES; COLD SNARE POLYPECTOMY; COLD FORCEP POLYPECTOMY; RESOLUTION CLIP X1;  Surgeon: Adán Pena MD;  Location: [facility] ENDOSCOPY;  Service: Gastroenterology    D & C HYSTEROSCOPY ENDOMETRIAL ABLATION  2015    ENDOSCOPY N/A 03/21/2019    Procedure: ESOPHAGOGASTRODUODENOSCOPY with cold biopsies and dilation;  Surgeon: Adán Pena MD;  Location: [facility] ENDOSCOPY;  Service: Gastroenterology    ENDOSCOPY N/A 04/28/2022    Procedure: ESOPHAGOGASTRODUODENOSCOPY;  Surgeon: Yunior Finnegan MD;  Location:  [facility];  Service: Bariatric;  Laterality: N/A;    GASTRIC RESTRICTION SURGERY      GASTRIC SLEEVE LAPAROSCOPIC N/A 04/28/2022    Procedure: GASTRIC SLEEVE LAPAROSCOPIC;  Surgeon: Yunior Finnegan MD;  Location:  [facility];  Service: Bariatric;  Laterality: N/A;    TEETH EXTRACTION      dentures on upper plate    TOTAL ABDOMINAL HYSTERECTOMY  2019    low transverse.  [facility] converted to open for scar tissue    TUBAL ABDOMINAL LIGATION  2004      Family History   Problem Relation Age of Onset    Colon cancer Paternal Uncle     Colon cancer Maternal Grandmother     Crohn's disease Maternal Grandmother     Diabetes Maternal Grandmother     Colon cancer Paternal Grandmother     Other Paternal Grandmother         Colitis    Diabetes Paternal Grandmother     Hyperlipidemia Paternal Grandmother     Hypertension Paternal Grandmother     Sleep apnea Paternal Grandmother     Colon cancer Paternal Grandfather     Diabetes Paternal Grandfather     Hypertension Paternal Grandfather     Hypertension Mother     Cancer Mother     Diabetes Maternal Grandfather     Hypertension Maternal Grandfather     Stroke Maternal Grandfather     No Known Problems Brother     No Known Problems Son     No Known  Problems Son     No Known Problems Daughter     No Known Problems Daughter       Social History     Socioeconomic History    Marital status:    Tobacco Use    Smoking status: Former     Current packs/day: 0.00     Average packs/day: 0.3 packs/day for 9.0 years (2.3 ttl pk-yrs)     Types: Cigarettes     Start date: 2002     Quit date: 2011     Years since quittin.2    Smokeless tobacco: Never   Vaping Use    Vaping status: Never Used   Substance and Sexual Activity    Alcohol use: Not Currently     Comment: social    Drug use: No    Sexual activity: Defer      Allergies   Allergen Reactions    Aspirin Unknown - High Severity     Please avoid all IV and oral NSAIDs until after 2022 for high risk of life-threatening delayed gastric leak.  Please contact Bariatric Surgery service if there are questions.    Morphine Itching    Prednisone Other (See Comments)     Please avoid all IV, intraarticular, intramuscular, and oral steroids until after 2022 for high risk of life-threatening delayed gastric leak.  Please contact Bariatric Surgery service if there are questions.    Toradol [Ketorolac Tromethamine] Other (See Comments)     Please avoid all IV and oral NSAIDs until after 2022 for high risk of life-threatening delayed gastric leak.  Please contact Bariatric Surgery service if there are questions.    Adhesive Tape Dermatitis     Luisa. Tele electrodes    Hydrocodone Itching and Rash    Jardiance [Empagliflozin] Itching and Rash    Latex Rash    Trulicity [Dulaglutide] Nausea Only      Current Outpatient Medications on File Prior to Visit   Medication Sig Dispense Refill    ascorbic acid (VITAMIN C) 1000 MG tablet Take 1 tablet by mouth Daily.      busPIRone (BUSPAR) 10 MG tablet Take 1 tablet by mouth Every 12 (Twelve) Hours.      Continuous Blood Gluc Transmit (Dexcom G6 Transmitter) misc 1 each Every 3 (Three) Months. 1 each 3    Continuous Blood Gluc Transmit (Dexcom G6  Transmitter) misc 1 each by Other route Every 3 (Three) Months. 1 each 2    Continuous Glucose  (Dexcom G7 ) device Use 1 each Take As Directed. 1 each 0    Continuous Glucose Sensor (Dexcom G7 Sensor) misc Use 1 each Every 10 (Ten) Days. 3 each 5    cyclobenzaprine (FLEXERIL) 10 MG tablet Take 1 tablet by mouth 2 (Two) Times a Day As Needed for Muscle Spasms.      Diclofenac Sodium (VOLTAREN) 1 % gel gel Apply 4 g topically to the appropriate area as directed 2 (Two) Times a Day.      DULoxetine (CYMBALTA) 60 MG capsule Take 1 capsule by mouth Daily.      estradiol (ESTRACE) 1 MG tablet Take 1 tablet by mouth Daily.      famotidine (PEPCID) 40 MG tablet TAKE 1 TABLET BY MOUTH TWICE DAILY 180 tablet 0    Fezolinetant (Veozah) 45 MG tablet Take 1 tablet by mouth Daily.      gabapentin (NEURONTIN) 400 MG capsule Take 1 capsule by mouth 3 (Three) Times a Day.      guaiFENesin (MUCINEX) 600 MG 12 hr tablet Take 1 tablet by mouth Every 12 (Twelve) Hours.      Gvoke HypoPen 2-Pack 1 MG/0.2ML solution auto-injector       insulin detemir (LEVEMIR) 100 UNIT/ML injection Inject 14 Units under the skin into the appropriate area as directed 2 (Two) Times a Day. 15 mL 2    Insulin Disposable Pump (Omnipod 5 [id number] Pods Gen 5) misc Use 1 each Every Other Day. 15 each 3    Insulin Lispro (humaLOG) 100 UNIT/ML injection For use in insulin pump.   30 mL 5    Insulin Pen Needle (B-D UF III MINI PEN NEEDLES) 31G X 5 MM misc Use as directed four times daily. 200 each 3    ketotifen (ZADITOR) 0.025 % ophthalmic solution Administer 2 drops to both eyes 2 (Two) Times a Day.      lidocaine (LIDODERM) 5 % Place 1 patch on the skin as directed by provider.      Linzess 145 MCG capsule capsule Take 1 capsule by mouth Every Morning Before Breakfast.      loratadine (CLARITIN) 10 MG tablet Take 1 tablet by mouth Daily.      Multiple Vitamins-Minerals (Hair Skin Nails) capsule Take 1 capsule by mouth Daily.       omeprazole (priLOSEC) 40 MG capsule Take 1 capsule by mouth 2 (Two) Times a Day. 180 capsule 1    ondansetron (ZOFRAN) 4 MG tablet Take 1 tablet by mouth Every 6 (Six) Hours As Needed.      oxyCODONE (ROXICODONE) 5 MG immediate release tablet Take 1 tablet by mouth Daily.      Rimegepant Sulfate (Nurtec) 75 MG tablet dispersible tablet Take  by mouth.      VITAMIN D PO Take 1 capsule by mouth Daily.      [DISCONTINUED] methocarbamol (ROBAXIN) 750 MG tablet Take 1 tablet by mouth 4 (Four) Times a Day As Needed for Muscle Spasms.      [DISCONTINUED] Pregabalin (LYRICA PO) Take  by mouth 3 times a day.      folic acid (FOLVITE) 1 MG tablet Take 1 tablet by mouth Daily. (Patient not taking: Reported on 4/7/2025)      hydroCHLOROthiazide 12.5 MG tablet Take 1 tablet by mouth Daily. (Patient not taking: Reported on 4/7/2025)      insulin aspart (NovoLOG FlexPen) 100 UNIT/ML solution pen-injector sc pen Inject 300 Units under the skin into the appropriate area as directed 3 (Three) Times a Day With Meals. (Patient not taking: Reported on 4/7/2025)       No current facility-administered medications on file prior to visit.        Review of Systems   Constitutional:  Positive for fatigue. Negative for unexpected weight gain and unexpected weight loss.   Eyes: Negative.    Endocrine: Negative for polydipsia, polyphagia and polyuria.   Psychiatric/Behavioral:  Negative for sleep disturbance.    All other systems reviewed and are negative.    /78 (BP Location: Left arm, Patient Position: Sitting, Cuff Size: Large Adult)   Pulse 91   Wt 79.2 kg (174 lb 9.6 oz)   LMP  (LMP Unknown)   SpO2 97%   BMI 32.99 kg/m²      Physical Exam  Vitals reviewed.   Constitutional:       Appearance: Normal appearance.   Eyes:      Extraocular Movements: Extraocular movements intact.   Cardiovascular:      Rate and Rhythm: Normal rate.   Pulmonary:      Effort: Pulmonary effort is normal.   Skin:     General: Skin is warm and dry.  "  Neurological:      General: No focal deficit present.      Mental Status: She is alert and oriented to person, place, and time.   Psychiatric:         Mood and Affect: Mood normal.         Behavior: Behavior normal.         Thought Content: Thought content normal.         Judgment: Judgment normal.         CMP:  Lab Results   Component Value Date    Glucose 236 (H) 12/18/2024    Glucose 267 (A) 04/05/2024    Glucose 287 (H) 02/15/2023    Glucose 500 (A) 07/30/2021    Glucose 142 (H) 07/30/2021    Glucose, Fasting 232 (H) 04/07/2025    Glucose, UA >=1000 mg/dL (3+) (A) 05/09/2022    BUN 8 12/18/2024    BUN 8 07/30/2021    BUN/Creatinine Ratio 11.6 12/18/2024    BUN/Creatinine Ratio 17 07/30/2021    Creatinine 0.69 12/18/2024    Creatinine 0.46 (L) 07/30/2021    Creatinine, Arterial 0.5 04/15/2016    Ketones, UA >=160 mg/dL (4+) (A) 05/09/2022    CO2 28.7 12/18/2024    CO2 Content 8.3 (L) 05/09/2022    Calcium 9.3 12/18/2024    Calcium 8.6 (L) 07/30/2021    Albumin 4.4 12/18/2024    AST (SGOT) 12 12/18/2024    ALT (SGPT) 10 12/18/2024     Lipid Panel:  Lab Results   Component Value Date    CHOL 175 12/18/2024    TRIG 128 12/18/2024    HDL 51 12/18/2024    VLDL 23 12/18/2024     (H) 12/18/2024     HbA1c:  Lab Results   Component Value Date    HGBA1C 9.5 (A) 04/07/2025     Glucose:  Lab Results   Component Value Date    POCGLU 267 (A) 04/05/2024     Microalbumin:  No results found for: \"MALBCRERATIO\"  TSH:  Lab Results   Component Value Date    TSH 1.480 12/18/2024       Assessment and Plan    Diagnoses and all orders for this visit:    1. Type 1 diabetes mellitus with hyperglycemia (Primary)  Assessment & Plan:  -Diabetes is above goal with A1c 9.5.  -Discussed importance of yearly diabetic eye exams.  -Discussed importance of checking blood sugars regularly.  Using Dexcom CGM.  2-week data download shows the following:  Very high: 55%   high: 22%  In range: 23%  Low: 0%  Very low: 0%  Trends shows " hyperglycemia with postprandial hyperglycemia noted.    -After reviewing patient's insulin pump settings, it appears that she is on the wrong basal setting.  Will have her deactivate basal to settings which are very low, and reactivate basal 1 settings which are her more accurate settings.  -We discussed use of another insulin pump to help better control her blood sugars.  She and has been discussed and they are agreeable and would like to try to use the beta islet insulin pump.  Will send information and fill out forms to send to rep to have this process started.  -Discussed use of Glucagon and appropriate timing.  -S/S hypoglycemia reviewed with rule of 15's advised.   -Follow-up in 3 months.    Orders:  -     POC Glycosylated Hemoglobin (Hb A1C)  -     Glutamic Acid Decarboxylase  -     C-Peptide  -     Glucose, Fasting           Return in about 3 months (around 7/7/2025) for Follow-up appointment, A1C. The patient was instructed to contact the clinic with any interval questions or concerns.        This document has been electronically signed by SATHISH Fitzpatrick  April 7, 2025 11:59 EDT   Endocrinology    Please note that portions of this document were completed with a voice recognition program. Efforts were made to edit the dictations, but occasionally words are mis-transcribed.

## 2025-04-08 LAB — C PEPTIDE SERPL-MCNC: 0.5 NG/ML (ref 1.1–4.4)

## 2025-04-11 LAB — GAD65 AB SER IA-ACNC: 11.5 U/ML (ref 0–5)

## 2025-06-24 RX ORDER — INSULIN LISPRO 100 [IU]/ML
INJECTION, SOLUTION INTRAVENOUS; SUBCUTANEOUS
Qty: 30 ML | Refills: 1 | Status: SHIPPED | OUTPATIENT
Start: 2025-06-24

## 2025-07-28 ENCOUNTER — OFFICE VISIT (OUTPATIENT)
Dept: ENDOCRINOLOGY | Facility: CLINIC | Age: 40
End: 2025-07-28
Payer: MEDICAID

## 2025-07-28 VITALS
OXYGEN SATURATION: 97 % | WEIGHT: 191 LBS | DIASTOLIC BLOOD PRESSURE: 72 MMHG | BODY MASS INDEX: 36.09 KG/M2 | HEART RATE: 97 BPM | SYSTOLIC BLOOD PRESSURE: 116 MMHG

## 2025-07-28 DIAGNOSIS — E10.65 TYPE 1 DIABETES MELLITUS WITH HYPERGLYCEMIA: Primary | ICD-10-CM

## 2025-07-28 LAB
EXPIRATION DATE: ABNORMAL
HBA1C MFR BLD: 8.3 % (ref 4.5–5.7)
Lab: ABNORMAL

## 2025-07-28 NOTE — PROGRESS NOTES
"  Rain Barreto is a 39 y.o. female had concerns including Follow-up (Type 1 diabetes mellitus with hyperglycemia//).    T1DM.    Had spinal surgery on her neck.  She has had issues since being on steroids since then. She is now having hypos in the early morning time.  She has been having to make adjustments to it often to keep hypos under control.     She has started on Beta ilet insulin pump and was administering extra doses of insulin when her blood sugars were high from the use of steroids after her surgery.  Since that time she has not been able to get her pump readjusted and does not have it on today.     Diabetes:  Diabetes was diagnosed .  Complications include neuropathy.  Last ophtho exam was 2022, [place] at [facility].  Current medications for diabetes include Humalog in an beta ilet insulin pump.  She checks her blood sugar dexcom 288 times per day.   Hypos: rarely    The following portions of the patient's history were reviewed and updated as appropriate: allergies, current medications, past family history, past medical history, past social history, past surgical history and problem list.    Diet: She is very limited due to her recent gastric bypass surgery.    Past Medical History:   Diagnosis Date    Anxiety     Depression     Diabetes mellitus     Type I, has detachable pump. dx in 's    Diabetes mellitus type I     Diverticulosis     Dyspepsia     Gallstones     Gestational diabetes     Heartburn     takes prn zantac and tums, EGD  [facility] [place], EGD Dr. Finnegan 19    HLD (hyperlipidemia)     Hypertension     denies    Kidney infection     Kidney stone     Migraine     MRSA carrier     PAT (+) 22     (normal spontaneous vaginal delivery)     1st child w/ preeclampsia    PONV (postoperative nausea and vomiting)     Tattoos     Wears dentures     upper    Wheezing     \"seasonal allergies\" uses inhalers prn     Past Surgical History:   Procedure Laterality Date    "  SECTION  2000     SECTION       SECTION      CHOLECYSTECTOMY N/A 2022    Procedure: CHOLECYSTECTOMY LAPAROSCOPIC;  Surgeon: Yunior Finnegan MD;  Location:  [facility];  Service: General;  Laterality: N/A;    COLONOSCOPY N/A 04/15/2019    Procedure: COLONOSCOPY W/ COLD FORCEP BIOPSIES; COLD SNARE POLYPECTOMY; COLD FORCEP POLYPECTOMY; RESOLUTION CLIP X1;  Surgeon: Adán Pena MD;  Location: [facility] ENDOSCOPY;  Service: Gastroenterology    D & C HYSTEROSCOPY ENDOMETRIAL ABLATION      ENDOSCOPY N/A 2019    Procedure: ESOPHAGOGASTRODUODENOSCOPY with cold biopsies and dilation;  Surgeon: Adán Pena MD;  Location: [facility] ENDOSCOPY;  Service: Gastroenterology    ENDOSCOPY N/A 2022    Procedure: ESOPHAGOGASTRODUODENOSCOPY;  Surgeon: Yunior Finnegan MD;  Location:  [facility];  Service: Bariatric;  Laterality: N/A;    GASTRIC RESTRICTION SURGERY      GASTRIC SLEEVE LAPAROSCOPIC N/A 2022    Procedure: GASTRIC SLEEVE LAPAROSCOPIC;  Surgeon: Yunior Finnegan MD;  Location:  [facility];  Service: Bariatric;  Laterality: N/A;    TEETH EXTRACTION      dentures on upper plate    TOTAL ABDOMINAL HYSTERECTOMY  2019    low transverse.  [facility] - lap converted to open for scar tissue    TUBAL ABDOMINAL LIGATION        Family History   Problem Relation Age of Onset    Colon cancer Paternal Uncle     Colon cancer Maternal Grandmother     Crohn's disease Maternal Grandmother     Diabetes Maternal Grandmother     Colon cancer Paternal Grandmother     Other Paternal Grandmother         Colitis    Diabetes Paternal Grandmother     Hyperlipidemia Paternal Grandmother     Hypertension Paternal Grandmother     Sleep apnea Paternal Grandmother     Colon cancer Paternal Grandfather     Diabetes Paternal Grandfather     Hypertension Paternal Grandfather     Hypertension Mother     Cancer Mother     Diabetes Maternal Grandfather     Hypertension Maternal  Grandfather     Stroke Maternal Grandfather     No Known Problems Brother     No Known Problems Son     No Known Problems Son     No Known Problems Daughter     No Known Problems Daughter       Social History     Socioeconomic History    Marital status:    Tobacco Use    Smoking status: Former     Current packs/day: 0.00     Average packs/day: 0.3 packs/day for 9.0 years (2.3 ttl pk-yrs)     Types: Cigarettes     Start date: 2002     Quit date: 2011     Years since quittin.5     Passive exposure: Past    Smokeless tobacco: Never   Vaping Use    Vaping status: Never Used   Substance and Sexual Activity    Alcohol use: Not Currently     Comment: social    Drug use: No    Sexual activity: Defer      Allergies   Allergen Reactions    Aspirin Unknown - High Severity     Please avoid all IV and oral NSAIDs until after 2022 for high risk of life-threatening delayed gastric leak.  Please contact Bariatric Surgery service if there are questions.    Morphine Itching    Prednisone Other (See Comments)     Please avoid all IV, intraarticular, intramuscular, and oral steroids until after 2022 for high risk of life-threatening delayed gastric leak.  Please contact Bariatric Surgery service if there are questions.    Toradol [Ketorolac Tromethamine] Other (See Comments)     Please avoid all IV and oral NSAIDs until after 2022 for high risk of life-threatening delayed gastric leak.  Please contact Bariatric Surgery service if there are questions.    Adhesive Tape Dermatitis     Luisa. Tele electrodes    Hydrocodone Itching and Rash    Jardiance [Empagliflozin] Itching and Rash    Latex Rash    Trulicity [Dulaglutide] Nausea Only      Current Outpatient Medications on File Prior to Visit   Medication Sig Dispense Refill    ascorbic acid (VITAMIN C) 1000 MG tablet Take 1 tablet by mouth Daily.      busPIRone (BUSPAR) 10 MG tablet Take 1 tablet by mouth Every 12 (Twelve) Hours.       Continuous Blood Gluc Transmit (Dexcom G6 Transmitter) misc 1 each Every 3 (Three) Months. 1 each 3    Continuous Blood Gluc Transmit (Dexcom G6 Transmitter) misc 1 each by Other route Every 3 (Three) Months. 1 each 2    Continuous Glucose  (Dexcom G7 ) device Use 1 each Take As Directed. 1 each 0    Continuous Glucose Sensor (Dexcom G7 Sensor) misc Use 1 each Every 10 (Ten) Days. 3 each 5    cyclobenzaprine (FLEXERIL) 10 MG tablet Take 1 tablet by mouth 2 (Two) Times a Day As Needed for Muscle Spasms.      Diclofenac Sodium (VOLTAREN) 1 % gel gel Apply 4 g topically to the appropriate area as directed 2 (Two) Times a Day.      DULoxetine (CYMBALTA) 60 MG capsule Take 1 capsule by mouth Daily.      estradiol (ESTRACE) 1 MG tablet Take 1 tablet by mouth Daily.      famotidine (PEPCID) 40 MG tablet TAKE 1 TABLET BY MOUTH TWICE DAILY 180 tablet 0    Fezolinetant (Veozah) 45 MG tablet Take 1 tablet by mouth Daily.      gabapentin (NEURONTIN) 400 MG capsule Take 1 capsule by mouth 3 (Three) Times a Day.      guaiFENesin (MUCINEX) 600 MG 12 hr tablet Take 1 tablet by mouth Every 12 (Twelve) Hours.      Gvoke HypoPen 2-Pack 1 MG/0.2ML solution auto-injector       insulin detemir (LEVEMIR) 100 UNIT/ML injection Inject 14 Units under the skin into the appropriate area as directed 2 (Two) Times a Day. 15 mL 2    Insulin Disposable Pump (Omnipod 5 WecV0O2 Pods Gen 5) misc Use 1 each Every Other Day. (Patient taking differently: Use 1 each Every Other Day. Bionic pump) 15 each 3    Insulin Lispro (humaLOG) 100 UNIT/ML injection FOR INSULIN PUMP USE, MAX DAILY DOSE  UNITS 30 mL 1    Insulin Pen Needle (B-D UF III MINI PEN NEEDLES) 31G X 5 MM misc Use as directed four times daily. 200 each 3    ketotifen (ZADITOR) 0.025 % ophthalmic solution Administer 2 drops to both eyes 2 (Two) Times a Day.      lidocaine (LIDODERM) 5 % Place 1 patch on the skin as directed by provider.      Linzess 145 MCG capsule  capsule Take 1 capsule by mouth Every Morning Before Breakfast.      loratadine (CLARITIN) 10 MG tablet Take 1 tablet by mouth Daily.      Multiple Vitamins-Minerals (Hair Skin Nails) capsule Take 1 capsule by mouth Daily.      omeprazole (priLOSEC) 40 MG capsule Take 1 capsule by mouth 2 (Two) Times a Day. 180 capsule 1    ondansetron (ZOFRAN) 4 MG tablet Take 1 tablet by mouth Every 6 (Six) Hours As Needed.      oxyCODONE (ROXICODONE) 5 MG immediate release tablet Take 1 tablet by mouth Daily.      Rimegepant Sulfate (Nurtec) 75 MG tablet dispersible tablet Take  by mouth.      VITAMIN D PO Take 1 capsule by mouth Daily.      folic acid (FOLVITE) 1 MG tablet Take 1 tablet by mouth Daily. (Patient not taking: Reported on 7/28/2025)      hydroCHLOROthiazide 12.5 MG tablet Take 1 tablet by mouth Daily. (Patient not taking: Reported on 7/28/2025)      insulin aspart (NovoLOG FlexPen) 100 UNIT/ML solution pen-injector sc pen Inject 300 Units under the skin into the appropriate area as directed 3 (Three) Times a Day With Meals. (Patient not taking: Reported on 7/28/2025)       No current facility-administered medications on file prior to visit.        Review of Systems   Constitutional:  Positive for fatigue. Negative for unexpected weight gain and unexpected weight loss.   Eyes: Negative.    Endocrine: Negative for polydipsia, polyphagia and polyuria.   Psychiatric/Behavioral:  Negative for sleep disturbance.    All other systems reviewed and are negative.    /72 (BP Location: Right arm, Patient Position: Sitting, Cuff Size: Large Adult)   Pulse 97   Wt 86.6 kg (191 lb)   LMP  (LMP Unknown)   SpO2 97%   BMI 36.09 kg/m²      Physical Exam  Vitals reviewed.   Constitutional:       Appearance: Normal appearance.   Eyes:      Extraocular Movements: Extraocular movements intact.   Cardiovascular:      Rate and Rhythm: Normal rate.   Pulmonary:      Effort: Pulmonary effort is normal.   Skin:     General: Skin is  "warm and dry.   Neurological:      General: No focal deficit present.      Mental Status: She is alert and oriented to person, place, and time.   Psychiatric:         Mood and Affect: Mood normal.         Behavior: Behavior normal.         Thought Content: Thought content normal.         Judgment: Judgment normal.         CMP:  Lab Results   Component Value Date    Glucose 236 (H) 12/18/2024    Glucose 267 (A) 04/05/2024    Glucose 287 (H) 02/15/2023    Glucose 500 (A) 07/30/2021    Glucose 142 (H) 07/30/2021    Glucose, Fasting 232 (H) 04/07/2025    Glucose, UA >=1000 mg/dL (3+) (A) 05/09/2022    BUN 8 12/18/2024    BUN 8 07/30/2021    BUN/Creatinine Ratio 11.6 12/18/2024    BUN/Creatinine Ratio 17 07/30/2021    Creatinine 0.69 12/18/2024    Creatinine 0.46 (L) 07/30/2021    Creatinine, Arterial 0.5 04/15/2016    Ketones, UA >=160 mg/dL (4+) (A) 05/09/2022    CO2 28.7 12/18/2024    CO2 Content 8.3 (L) 05/09/2022    Calcium 9.3 12/18/2024    Calcium 8.6 (L) 07/30/2021    Albumin 4.4 12/18/2024    AST (SGOT) 12 12/18/2024    ALT (SGPT) 10 12/18/2024     Lipid Panel:  Lab Results   Component Value Date    CHOL 175 12/18/2024    TRIG 128 12/18/2024    HDL 51 12/18/2024    VLDL 23 12/18/2024     (H) 12/18/2024     HbA1c:  Lab Results   Component Value Date    HGBA1C 8.3 (A) 07/28/2025     Glucose:  Lab Results   Component Value Date    POCGLU 267 (A) 04/05/2024     Microalbumin:  No results found for: \"MALBCRERATIO\"  TSH:  Lab Results   Component Value Date    TSH 1.480 12/18/2024       Assessment and Plan    Diagnoses and all orders for this visit:    1. Type 1 diabetes mellitus with hyperglycemia (Primary)  Assessment & Plan:  -Diabetes is improving with A1c down from 9.5 to 8.3.  -Discussed importance of yearly diabetic eye exams.  -Discussed importance of checking blood sugars regularly.  Using Dexcom CGM.  -After discussing sequence of events with patient and , it appears that the patient was not " consistent with meal announcements with the beta ilet insulin pump.  Therefore it was not able to gather enough data to adequately provide appropriate insulin adjustments.  Hard factory reset was done on the pump in office today and patient is to connect it to her CGM and start wearing it again later today.  She will start meal announcing.   -Discussed use of Glucagon and appropriate timing.  -S/S hypoglycemia reviewed with rule of 15's advised.   -Follow-up in 3 months.    Orders:  -     POC Glycosylated Hemoglobin (Hb A1C)           Return in about 3 months (around 10/28/2025) for A1C, Follow-up appointment. The patient was instructed to contact the clinic with any interval questions or concerns.        This document has been electronically signed by SATHISH Fitzpatrick  July 29, 2025 14:25 EDT   Endocrinology    Please note that portions of this document were completed with a voice recognition program. Efforts were made to edit the dictations, but occasionally words are mis-transcribed.

## 2025-07-29 NOTE — ASSESSMENT & PLAN NOTE
-Diabetes is improving with A1c down from 9.5 to 8.3.  -Discussed importance of yearly diabetic eye exams.  -Discussed importance of checking blood sugars regularly.  Using Dexcom CGM.  -After discussing sequence of events with patient and , it appears that the patient was not consistent with meal announcements with the beta ilet insulin pump.  Therefore it was not able to gather enough data to adequately provide appropriate insulin adjustments.  Hard factory reset was done on the pump in office today and patient is to connect it to her CGM and start wearing it again later today.  She will start meal announcing.   -Discussed use of Glucagon and appropriate timing.  -S/S hypoglycemia reviewed with rule of 15's advised.   -Follow-up in 3 months.

## (undated) DEVICE — KT ORCA VLV SXN AIR/H2O W/SEAL 1P/U STRL

## (undated) DEVICE — PATIENT RETURN ELECTRODE, SINGLE-USE, CONTACT QUALITY MONITORING, ADULT, WITH 9FT CORD, FOR PATIENTS WEIGING OVER 33LBS. (15KG): Brand: MEGADYNE

## (undated) DEVICE — ENDOGATOR AUXILIARY WATER JET CONNECTOR: Brand: ENDOGATOR

## (undated) DEVICE — Device: Brand: DEFENDO AIR/WATER/SUCTION AND BIOPSY VALVE

## (undated) DEVICE — [HIGH FLOW INSUFFLATOR,  DO NOT USE IF PACKAGE IS DAMAGED,  KEEP DRY,  KEEP AWAY FROM SUNLIGHT,  PROTECT FROM HEAT AND RADIOACTIVE SOURCES.]: Brand: PNEUMOSURE

## (undated) DEVICE — CVR HNDL LIGHT RIGID

## (undated) DEVICE — CLMP STD 22CM DISP

## (undated) DEVICE — SNAR POLYP SENSATION STDOVL 27 240 BX40

## (undated) DEVICE — Device

## (undated) DEVICE — APPL CHLORAPREP TINTED 26ML TEAL

## (undated) DEVICE — INTENDED USE FOR SURGICAL MARKING ON INTACT SKIN, ALSO PROVIDES A PERMANENT METHOD OF IDENTIFYING OBJECTS IN THE OPERATING ROOM: Brand: WRITESITE® REGULAR TIP SKIN MARKER

## (undated) DEVICE — BLANKT WARM UPPR/BDY ARM/OUT 57X196CM

## (undated) DEVICE — TROCAR: Brand: KII FIOS FIRST ENTRY

## (undated) DEVICE — CONMED SCOPE SAVER BITE BLOCK, 20X27 MM: Brand: SCOPE SAVER

## (undated) DEVICE — GLV SURG SENSICARE PI MIC PF SZ8.5 LF STRL

## (undated) DEVICE — ENDOPATH XCEL UNIVERSAL TROCAR STABLILITY SLEEVES: Brand: ENDOPATH XCEL

## (undated) DEVICE — GOWN,NON-REINFORCED,SIRUS,SET IN SLV,XXL: Brand: MEDLINE

## (undated) DEVICE — FRCP BIOP COLD ENDOJAW ALLGTR W/NDL 2.8X2300MM BLU

## (undated) DEVICE — ENDOPATH 5MM ENDOSCOPIC BLUNT TIP DISSECTORS (12 POUCHES CONTAINING 3 DISSECTORS EACH): Brand: ENDOPATH

## (undated) DEVICE — ENDOPATH XCEL BLADELESS TROCARS WITH STABILITY SLEEVES: Brand: ENDOPATH XCEL

## (undated) DEVICE — TRAP,MUCUS SPECIMEN,40CC: Brand: MEDLINE

## (undated) DEVICE — JELLY,LUBE,STERILE,FLIP TOP,TUBE,2-OZ: Brand: MEDLINE

## (undated) DEVICE — SUT MONOCRYL PLS ANTIB UND 3/0  PS1 27IN

## (undated) DEVICE — GLV SURG SENSICARE SLT PF LF 9 STRL

## (undated) DEVICE — PAD GRND REM POLYHESIVE A/ DISP

## (undated) DEVICE — APPL COTN TP PLSTC 6IN STRL LF PK/2

## (undated) DEVICE — UNDRPD COMFRT GLD DRYPAD 36X57IN

## (undated) DEVICE — ENSEAL X1 TISSUE SEALER, CURVED JAW, 45 CM SHAFT LENGTH: Brand: ENSEAL

## (undated) DEVICE — POWER SHELL: Brand: SIGNIA

## (undated) DEVICE — ENSEAL LAPAROSCOPIC TISSUE SEALER G2 ARTICULATING CURVED JAW FOR USE WITH G2 GENERATOR 5MM DIAMETER 45CM SHAFT LENGTH: Brand: ENSEAL

## (undated) DEVICE — LAPAROSCOPIC SMOKE FILTRATION SYSTEM: Brand: PALL LAPAROSHIELD® PLUS LAPAROSCOPIC SMOKE FILTRATION SYSTEM

## (undated) DEVICE — PK BARIATRIC 10

## (undated) DEVICE — SHT AIR TRANSFR COMFRT GLIDE LAT 40X80IN

## (undated) DEVICE — CONTN GRAD MEAS TRIANG 32OZ BLK

## (undated) DEVICE — 2000CC GUARDIAN II: Brand: GUARDIAN